# Patient Record
Sex: MALE | Race: WHITE | NOT HISPANIC OR LATINO | Employment: OTHER | URBAN - METROPOLITAN AREA
[De-identification: names, ages, dates, MRNs, and addresses within clinical notes are randomized per-mention and may not be internally consistent; named-entity substitution may affect disease eponyms.]

---

## 2017-01-19 DIAGNOSIS — E78.5 HLD (HYPERLIPIDEMIA): Primary | ICD-10-CM

## 2017-01-19 RX ORDER — ROSUVASTATIN CALCIUM 20 MG/1
20 TABLET, COATED ORAL DAILY
Qty: 90 TABLET | Refills: 0 | Status: SHIPPED | OUTPATIENT
Start: 2017-01-19 | End: 2017-04-26

## 2017-02-07 ENCOUNTER — OFFICE VISIT (OUTPATIENT)
Dept: CARDIOLOGY | Facility: CLINIC | Age: 67
End: 2017-02-07
Attending: INTERNAL MEDICINE
Payer: COMMERCIAL

## 2017-02-07 ENCOUNTER — HOSPITAL ENCOUNTER (OUTPATIENT)
Dept: LAB | Facility: CLINIC | Age: 67
Discharge: HOME | End: 2017-02-07
Attending: INTERNAL MEDICINE | Admitting: INTERNAL MEDICINE
Payer: COMMERCIAL

## 2017-02-07 VITALS
DIASTOLIC BLOOD PRESSURE: 74 MMHG | WEIGHT: 231 LBS | SYSTOLIC BLOOD PRESSURE: 144 MMHG | HEIGHT: 73 IN | HEART RATE: 60 BPM | BODY MASS INDEX: 30.62 KG/M2

## 2017-02-07 DIAGNOSIS — I25.10 CORONARY ARTERY DISEASE INVOLVING NATIVE CORONARY ARTERY OF NATIVE HEART WITHOUT ANGINA PECTORIS: ICD-10-CM

## 2017-02-07 DIAGNOSIS — I10 ESSENTIAL HYPERTENSION: ICD-10-CM

## 2017-02-07 DIAGNOSIS — Z76.0 ENCOUNTER FOR MEDICATION REFILL: Primary | ICD-10-CM

## 2017-02-07 DIAGNOSIS — R23.2 FACIAL FLUSHING: ICD-10-CM

## 2017-02-07 LAB
ALT SERPL W P-5'-P-CCNC: 32 U/L (ref 0–70)
ALT SERPL W P-5'-P-CCNC: <5 U/L (ref 5–30)
ANION GAP SERPL CALCULATED.3IONS-SCNC: 7 MMOL/L (ref 3–14)
BUN SERPL-MCNC: 15 MG/DL (ref 7–30)
CALCIUM SERPL-MCNC: 9.2 MG/DL (ref 8.5–10.1)
CHLORIDE SERPL-SCNC: 105 MMOL/L (ref 94–109)
CHOLEST SERPL-MCNC: 145 MG/DL
CHOLEST SERPL-MCNC: 148 MG/DL
CO2 SERPL-SCNC: 27 MMOL/L (ref 20–32)
CREAT SERPL-MCNC: 1.13 MG/DL (ref 0.66–1.25)
GFR SERPL CREATININE-BSD FRML MDRD: 65 ML/MIN/1.7M2
GLUCOSE SERPL-MCNC: 101 MG/DL (ref 70–99)
HDLC SERPL-MCNC: 54 MG/DL
HDLC SERPL-MCNC: 65 MG/DL
LDLC SERPL CALC-MCNC: 60 MG/DL
LDLC SERPL CALC-MCNC: 74 MG/DL
NONHDLC SERPL-MCNC: 80 MG/DL
NONHDLC SERPL-MCNC: 94 MG/DL
POTASSIUM SERPL-SCNC: 4.5 MMOL/L (ref 3.4–5.3)
SODIUM SERPL-SCNC: 139 MMOL/L (ref 133–144)
TRIGL SERPL-MCNC: 101 MG/DL
TRIGL SERPL-MCNC: 98 MG/DL

## 2017-02-07 PROCEDURE — 99214 OFFICE O/P EST MOD 30 MIN: CPT | Performed by: INTERNAL MEDICINE

## 2017-02-07 PROCEDURE — 84460 ALANINE AMINO (ALT) (SGPT): CPT | Performed by: INTERNAL MEDICINE

## 2017-02-07 PROCEDURE — 36415 COLL VENOUS BLD VENIPUNCTURE: CPT | Performed by: INTERNAL MEDICINE

## 2017-02-07 PROCEDURE — 80061 LIPID PANEL: CPT | Performed by: INTERNAL MEDICINE

## 2017-02-07 PROCEDURE — 80048 BASIC METABOLIC PNL TOTAL CA: CPT | Performed by: INTERNAL MEDICINE

## 2017-02-07 RX ORDER — LISINOPRIL 40 MG/1
40 TABLET ORAL DAILY
Qty: 90 TABLET | Refills: 3 | Status: SHIPPED | OUTPATIENT
Start: 2017-02-07 | End: 2017-03-24 | Stop reason: ALTCHOICE

## 2017-02-07 NOTE — PROGRESS NOTES
HPI and Plan:   See dictation    Orders Placed This Encounter   Procedures     5-HIAA quantitative urine     Basic metabolic panel     Lipid Profile     ALT     Basic metabolic panel     Follow-Up with Cardiac Advanced Practice Provider     Follow-Up with Cardiologist       Orders Placed This Encounter   Medications     lisinopril (PRINIVIL/ZESTRIL) 40 MG tablet     Sig: Take 1 tablet (40 mg) by mouth daily     Dispense:  90 tablet     Refill:  3       Medications Discontinued During This Encounter   Medication Reason     FLUARIX QUADRIVALENT 0.5 ML injection Therapy completed     ketoconazole (NIZORAL) 2 % cream Therapy completed     lisinopril (PRINIVIL/ZESTRIL) 20 MG tablet Reorder         Encounter Diagnoses   Name Primary?     Essential hypertension      Coronary artery disease involving native coronary artery of native heart without angina pectoris      Encounter for medication refill Yes     Facial flushing        CURRENT MEDICATIONS:  Current Outpatient Prescriptions   Medication Sig Dispense Refill     lisinopril (PRINIVIL/ZESTRIL) 40 MG tablet Take 1 tablet (40 mg) by mouth daily 90 tablet 3     rosuvastatin (CRESTOR) 20 MG tablet Take 1 tablet (20 mg) by mouth daily 90 tablet 0     albuterol (VENTOLIN HFA) 108 (90 BASE) MCG/ACT inhaler Inhale 2 puffs into the lungs every 6 hours as needed for shortness of breath / dyspnea or wheezing       tadalafil (CIALIS) 5 MG tablet Take 1 tablet by mouth as needed for erectile dysfunction. Never use with nitroglycerin, terazosin or doxazosin. 30 tablet 1     Multiple Vitamin (DAILY MULTIVITAMIN PO) Take  by mouth.         aspirin 81 MG tablet Take 1 tablet by mouth daily.  3     GLUCOSAMINE-CHONDROITIN PO Take 1 tablet by mouth daily. 1000 mg         ranitidine (ZANTAC) 150 MG tablet Take 150 mg by mouth as needed.       [DISCONTINUED] lisinopril (PRINIVIL/ZESTRIL) 20 MG tablet Take 1 tablet (20 mg) by mouth daily 90 tablet 11       ALLERGIES     Allergies   Allergen  "Reactions     Simvastatin      achey       PAST MEDICAL HISTORY:  Past Medical History   Diagnosis Date     ACP (advance care planning)      will bring copy in      Esophageal reflux 5/17/2013     Hyperlipidaemia      CAD (coronary artery disease) 2002     a stent     Hypertension      Family history of ischemic heart disease      Bruit        PAST SURGICAL HISTORY:  Past Surgical History   Procedure Laterality Date     Rotator cuff repair rt/lt  2009     Dr. Butler     Heart cath, angioplasty  10/4/02     3.0 X 8 mm Velocity stent       FAMILY HISTORY:  Family History   Problem Relation Age of Onset     C.A.D. Father      CANCER Father      bone     CEREBROVASCULAR DISEASE Mother        SOCIAL HISTORY:  Social History     Social History     Marital Status:      Spouse Name: N/A     Number of Children: N/A     Years of Education: N/A     Occupational History     Finance      Social History Main Topics     Smoking status: Never Smoker      Smokeless tobacco: Never Used     Alcohol Use: 0.5 oz/week     1 Standard drinks or equivalent per week      Comment: ocasionally     Drug Use: No     Sexual Activity:     Partners: Female     Other Topics Concern     Parent/Sibling W/ Cabg, Mi Or Angioplasty Before 65f 55m? No     Sleep Concern Yes     occ     Stress Concern No     Exercise Yes     walks daily     Social History Narrative       Review of Systems:  Skin:  Negative       Eyes:  Positive for glasses    ENT:  Negative      Respiratory:  Positive for dyspnea on exertion     Cardiovascular:  Negative      Gastroenterology: Negative      Genitourinary:  Negative      Musculoskeletal:  Negative      Neurologic:  Negative      Psychiatric:  Negative      Heme/Lymph/Imm:  Negative      Endocrine:  Negative        Physical Exam:  Vitals: /74 mmHg  Pulse 60  Ht 1.854 m (6' 1\")  Wt 104.781 kg (231 lb)  BMI 30.48 kg/m2    Constitutional:  cooperative, alert and oriented, well developed, well nourished, in no " acute distress overweight      Skin:  warm and dry to the touch, no apparent skin lesions or masses noted        Head:  normocephalic, no masses or lesions        Eyes:  pupils equal and round, conjunctivae and lids unremarkable, sclera white, no xanthalasma, EOMS intact, no nystagmus        ENT:  no pallor or cyanosis, dentition good        Neck:  carotid pulses are full and equal bilaterally, JVP normal, no carotid bruit, no thyromegaly        Chest:  normal breath sounds, clear to auscultation, normal A-P diameter, normal symmetry, normal respiratory excursion, no use of accessory muscles          Cardiac: regular rhythm;normal S1 and S2;apical impulse not displaced;no murmurs, gallops or rubs detected   S4              Abdomen:  abdomen soft, non-tender, BS normoactive, no mass, no HSM, no bruits;abdomen soft        Vascular: pulses full and equal, no bruits auscultated                                        Extremities and Back:  no deformities, clubbing, cyanosis, erythema observed;no edema              Neurological:  affect appropriate, oriented to time, person and place;no gross motor deficits              CC  Braulio Blanca MD   [facility]  [address]  [place] [address]

## 2017-02-07 NOTE — PROGRESS NOTES
2017             Willie Nam MD    [facility]   [address] [place] [address]       RE:    Peter Hernandez   MRN:  [id number]   :  1950      Dear Willie:      It is my pleasure to see your patient, Peter Hernandez.  He is a very pleasant 66-year-old gentleman with a history of coronary artery disease and prior stenting of the left anterior descending artery, essential hypertension and hyperlipidemia.        Since I last saw him, he has been doing reasonably well.  He has noticed that he gets facial flushing in the morning times, especially and he thought that this may be due to caffeine, but even with cutting out caffeine, he is still getting facial flushing.  We should certainly make sure that he does not have carcinoid syndrome.  He does not have any chest pains or chest pressure, but he has noticed occasional discomfort in his left and occasionally his right shoulder which is not associated with exertion.  He finds that movement of the shoulder or pressing on the shoulder relieves the discomfort.  This is most likely noncardiac.  He did have a stress echocardiogram performed about 13 months ago which showed no evidence of ischemia.      His blood pressure does not appear to be ideally controlled.  In August, his blood pressure was 148/78.  In December, his blood pressure is 154/70 and today his blood pressure is 144/74.  This is despite the fact that his lisinopril was increased from 10 mg to 20 mg.  He has gained weight, which may account for at least partially the rise in blood pressure.  In 10/2015, he weighed 226 pounds.  He now weighs 231 pounds.        His lipid profile is reasonably good.  His LDL is 74, HDL is 54 and triglycerides are 101.  However, in , his LDL was 57, HDL was 57 and triglycerides were 89.  Again, this may be due to the fact that he has gained weight.  He does try to adhere to a low-cholesterol diet, but he was unaware of foods that were salted,  such as nuts, chips, etc.  These of course can increase his blood pressure also.  He is going to try more diligently to watch out for foods containing salt.        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      IMPRESSION:   1.  Coronary artery disease.  The patient appears to be asymptomatic with respect to coronary disease with no objective evidence of ischemia on a stress echocardiogram 13 months ago.   2.  Essential hypertension.  His blood pressure is mildly raised, even despite the fact that his lisinopril was increased from 10 mg to 20 mg.  Part of this may be due to the fact that he has gained weight and he also may not be adhering to a low-salt diet.   3.  Hyperlipidemia.  His lipids are reasonably well controlled and he is on high-intensity statin therapy; however, his LDL has crept up over the last 3 years, probably due to weight gain.   4.  4.  Facial flushing.  We should make sure the patient does not have carcinoid syndrome.      PLAN:  I have encouraged the patient to adopt lifestyle changes including low-salt diet, exercise and weight reduction.  In 3 months' time, we will recheck his lipids and also his blood pressure.  Secondly, I will increase lisinopril from 20 mg to 40 mg.  We will also recheck his blood pressure in 12 weeks' time again.  We will also check his basic metabolic profile to ensure that the increase in lisinopril does not affect his kidney function.  Finally, we will obtain a 24-hour urine collection for 5HIAA to ensure that he does not have carcinoid syndrome.        It has been a pleasure being involved in the care of this very nice patient.  I look forward to seeing him again.      Sincerely,         MD MONY Valdovinos MD, [facility]             D: 02/07/2017 08:21   T: 02/07/2017 11:39   MT: [id number]      Name:     NATALY CONTRERAS   MRN:       [id number]        Account:      [id number]   :      1950           Service Date: 2017      Document: [id number]

## 2017-02-07 NOTE — Clinical Note
2017             Willie Nam MD    [facility]   [address] [place] [address]       RE:    Peter Hernandez   MRN:  [id number]   :  1950      Dear Willie:      It is my pleasure to see your patient, Peter Hernandez.  He is a very pleasant 66-year-old gentleman with a history of coronary artery disease and prior stenting of the left anterior descending artery, essential hypertension and hyperlipidemia.        Since I last saw him, he has been doing reasonably well.  He has noticed that he gets facial flushing in the morning times, especially and he thought that this may be due to caffeine, but even with cutting out caffeine, he is still getting facial flushing.  We should certainly make sure that he does not have carcinoid syndrome.  He does not have any chest pains or chest pressure, but he has noticed occasional discomfort in his left and occasionally his right shoulder which is not associated with exertion.  He finds that movement of the shoulder or pressing on the shoulder relieves the discomfort.  This is most likely noncardiac.  He did have a stress echocardiogram performed about 13 months ago which showed no evidence of ischemia.      His blood pressure does not appear to be ideally controlled.  In August, his blood pressure was 148/78.  In December, his blood pressure is 154/70 and today his blood pressure is 144/74.  This is despite the fact that his lisinopril was increased from 10 mg to 20 mg.  He has gained weight, which may account for at least partially the rise in blood pressure.  In 10/2015, he weighed 226 pounds.  He now weighs 231 pounds.        His lipid profile is reasonably good.  His LDL is 74, HDL is 54 and triglycerides are 101.  However, in , his LDL was 57, HDL was 57 and triglycerides were 89.  Again, this may be due to the fact that he has gained weight.  He does try to adhere to a low-cholesterol diet, but he was unaware of foods that were salted,  such as nuts, chips, etc.  These of course can increase his blood pressure also.  He is going to try more diligently to watch out for foods containing salt.          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      IMPRESSION:   1.  Coronary artery disease.  The patient appears to be asymptomatic with respect to coronary disease with no objective evidence of ischemia on a stress echocardiogram 13 months ago.   2.  Essential hypertension.  His blood pressure is mildly raised, even despite the fact that his lisinopril was increased from 10 mg to 20 mg.  Part of this may be due to the fact that he has gained weight and he also may not be adhering to a low-salt diet.   3.  Hyperlipidemia.  His lipids are reasonably well controlled and he is on high-intensity statin therapy; however, his LDL has crept up over the last 3 years, probably due to weight gain.   4.  4.  Facial flushing.  We should make sure the patient does not have carcinoid syndrome.      PLAN:  I have encouraged the patient to adopt lifestyle changes including low-salt diet, exercise and weight reduction.  In 3 months' time, we will recheck his lipids and also his blood pressure.  Secondly, I will increase lisinopril from 20 mg to 40 mg.  We will also recheck his blood pressure in 12 weeks' time again.  We will also check his basic metabolic profile to ensure that the increase in lisinopril does not affect his kidney function.  Finally, we will obtain a 24-hour urine collection for 5HIAA to ensure that he does not have carcinoid syndrome.        It has been a pleasure being involved in the care of this very nice patient.  I look forward to seeing him again.      Sincerely,        Braulio Aparicio MD

## 2017-02-13 ENCOUNTER — HOSPITAL ENCOUNTER (OUTPATIENT)
Dept: LAB | Facility: CLINIC | Age: 67
Discharge: HOME | End: 2017-02-13
Attending: INTERNAL MEDICINE | Admitting: INTERNAL MEDICINE
Payer: COMMERCIAL

## 2017-02-13 DIAGNOSIS — R23.2 FACIAL FLUSHING: ICD-10-CM

## 2017-02-13 PROCEDURE — 83497 ASSAY OF 5-HIAA: CPT | Performed by: INTERNAL MEDICINE

## 2017-02-15 LAB
5HIAA & CREATININE UR-IMP: NORMAL
5OH-INDOLEACETATE 24H UR-MCNC: 3.2 MG/ML
5OH-INDOLEACETATE 24H UR-MRATE: 7 MG/(24.H)
5OH-INDOLEACETATE/CREAT 24H UR: 4 MG/G{CREAT}
COLLECT DURATION TIME UR: 24 H
CREAT 24H UR-MRATE: 1806 G/(24.H)
CREAT SERPL-MCNC: 86 MG/DL
SPECIMEN VOL ?TM UR: 2100 ML

## 2017-02-24 ENCOUNTER — OFFICE VISIT (OUTPATIENT)
Dept: FAMILY MEDICINE | Facility: CLINIC | Age: 67
End: 2017-02-24

## 2017-02-24 VITALS
SYSTOLIC BLOOD PRESSURE: 146 MMHG | WEIGHT: 229 LBS | HEART RATE: 61 BPM | OXYGEN SATURATION: 97 % | DIASTOLIC BLOOD PRESSURE: 60 MMHG | BODY MASS INDEX: 30.21 KG/M2

## 2017-02-24 DIAGNOSIS — I10 BENIGN ESSENTIAL HYPERTENSION: Primary | ICD-10-CM

## 2017-02-24 DIAGNOSIS — D48.5 NEOPLASM OF UNCERTAIN BEHAVIOR OF SKIN: ICD-10-CM

## 2017-02-24 DIAGNOSIS — L82.0 INFLAMED SEBORRHEIC KERATOSIS: ICD-10-CM

## 2017-02-24 PROCEDURE — 17110 DESTRUCTION B9 LES UP TO 14: CPT | Performed by: FAMILY MEDICINE

## 2017-02-24 PROCEDURE — 99213 OFFICE O/P EST LOW 20 MIN: CPT | Mod: 25 | Performed by: FAMILY MEDICINE

## 2017-02-24 RX ORDER — AMLODIPINE BESYLATE 5 MG/1
5 TABLET ORAL DAILY
Qty: 30 TABLET | Refills: 1 | Status: SHIPPED | OUTPATIENT
Start: 2017-02-24 | End: 2017-04-21

## 2017-02-24 RX ORDER — LISINOPRIL 20 MG/1
TABLET ORAL
COMMUNITY
Start: 2016-12-19 | End: 2017-03-24 | Stop reason: ALTCHOICE

## 2017-02-24 RX ORDER — KETOCONAZOLE 20 MG/G
CREAM TOPICAL
COMMUNITY
Start: 2017-02-04 | End: 2017-05-26

## 2017-02-24 NOTE — PATIENT INSTRUCTIONS
Wheat Belly: Lose the Wheat, Lose the Weight, and Find Your Path Back to Health by Abad Abreu (Aug 30, 2011)

## 2017-02-24 NOTE — PROGRESS NOTES
Problem(s) Oriented visit        SUBJECTIVE:                                                    Peter Hernandez II is a 66 year old male who presents to clinic today for the following health issues :      1. Benign essential hypertension  he has Hypertension which is currently not well controlled. he has been compliant with his medications and is here today to follow up on the this issue and see if we can't work on better control of the blood pressure.    Reviewed last 6 BP readings in chart:  BP Readings from Last 6 Encounters:   02/24/17 146/60   02/07/17 144/74   12/19/16 154/70   11/20/16 167/89   08/22/16 148/78   11/30/15 140/80     he  has not experienced any significant side effects from current medications for hypertension.    NO active cardiac complaints or symptoms with exercise.    - amLODIPine (NORVASC) 5 MG tablet; Take 1 tablet (5 mg) by mouth daily  Dispense: 30 tablet; Refill: 1         Problem list, Medication list, Allergies, and Medical/Social/Surgical histories reviewed in EPIC and updated as appropriate.   Additional history: as documented    ROS:  General and Resp. completed and negative except as noted above    Histories:   Patient Active Problem List   Diagnosis     Hypercholesteremia     CAD (coronary artery disease)     ACP (advance care planning)     Esophageal reflux     Health Care Home     Family history of ischemic heart disease     Asthma     Benign essential hypertension     Past Surgical History   Procedure Laterality Date     Rotator cuff repair rt/lt  2009     Dr. Butler     Heart cath, angioplasty  10/4/02     3.0 X 8 mm Velocity stent       Social History   Substance Use Topics     Smoking status: Never Smoker     Smokeless tobacco: Never Used     Alcohol use 0.5 oz/week     1 Standard drinks or equivalent per week      Comment: ocasionally     Family History   Problem Relation Age of Onset     C.A.D. Father      CANCER Father      bone     CEREBROVASCULAR DISEASE Mother             OBJECTIVE:                                                    /60  Pulse 61  Wt 103.9 kg (229 lb)  SpO2 97%  BMI 30.21 kg/m2  Body mass index is 30.21 kg/(m^2).   APPEARANCE: = Relaxed and in no distress  Conj/Eyelids = noninjected and lids and lashes are without inflammation  PERRLA/Irises = Pupils Round Reactive to Light and Irisis without inflammation  Neck = No anterior or posterior adenopathy appreciated.  Thyroid = Not enlarged and no masses felt  Resp effort = Calm regular breathing  Breath Sounds = Good air movement with no rales or rhonchi in any lung fields  Heart Rate, Rythym, & sounds (no Murm)  = Regular rate and rythym with no S3, S4, or murmer appreciated.  Carotid Art's = Pulses full and equal and no bruits appreciated  Abdomen = Soft, nontender, no masses, & bowel sounds in all quadrants  Liver/Spleen = Normal span and no splenomegaly noted  Digits and Nails = FROM in all finger joints, no nail dystrophy  Ext (edema) = No pretibial edema noted or elsewhere  Musculsktl =  Strength and ROM of major joints are within normal limits  SKIN = absent significant rashes or lesions   Recent/Remote Memory = Alert and Oriented x 3  Mood/Affect = Cooperative and interested     ASSESSMENT/PLAN:                                                        Peter was seen today for results.    Diagnoses and all orders for this visit:    Benign essential hypertension  -     amLODIPine (NORVASC) 5 MG tablet; Take 1 tablet (5 mg) by mouth daily    recheck here in a month    Work on weight loss    The following health maintenance items are reviewed in Epic and correct as of today:  Health Maintenance   Topic Date Due     ASTHMA CONTROL TEST Q6 MOS (NO INBASKET)  02/22/2017     ASTHMA ACTION PLAN Q1 YR (NO INBASKET)  12/19/2017     LIPID MONITORING Q1 YEAR( NO INBASKET)  02/07/2018     ADVANCE DIRECTIVE PLANNING Q5 YRS (NO INBASKET)  05/17/2018     COLON CANCER SCREEN (SYSTEM ASSIGNED)  07/18/2021     TETANUS  IMMUNIZATION (SYSTEM ASSIGNED)  10/24/2023     INFLUENZA VACCINE (SYSTEM ASSIGNED)  Completed     HEPATITIS C SCREENING  Completed       Willie Nam MD  [facility]  [phone]    For any issues my office # is [phone]

## 2017-02-24 NOTE — MR AVS SNAPSHOT
After Visit Summary   2/24/2017    Peter Hernandez II    MRN: [id number]           Patient Information     Date Of Birth          1950        Visit Information        Provider Department      2/24/2017 8:30 AM Willie Nam MD [facility]        Today's Diagnoses     Benign essential hypertension    -  1      Care Instructions      Wheat Belly: Lose the Wheat, Lose the Weight, and Find Your Path Back to Health by Abad Abreu (Aug 30, 2011)          Follow-ups after your visit        Who to contact     If you have questions or need follow up information about today's clinic visit or your schedule please contact [facility] directly at [phone].  Normal or non-critical lab and imaging results will be communicated to you by MyChart, letter or phone within 4 business days after the clinic has received the results. If you do not hear from us within 7 days, please contact the clinic through [facility] or phone. If you have a critical or abnormal lab result, we will notify you by phone as soon as possible.  Submit refill requests through [facility] or call your pharmacy and they will forward the refill request to us. Please allow 3 business days for your refill to be completed.          Additional Information About Your Visit        MyChart Information     [facility] gives you secure access to your electronic health record. If you see a primary care provider, you can also send messages to your care team and make appointments. If you have questions, please call your primary care clinic.  If you do not have a primary care provider, please call [phone] and they will assist you.        Care EveryWhere ID     This is your Care EveryWhere ID. This could be used by other organizations to access your [facility] medical records  [id number]        Your Vitals Were     Pulse Pulse Oximetry BMI (Body Mass Index)             61 97% 30.21 kg/m2          Blood Pressure from Last 3  Encounters:   02/24/17 146/60   02/07/17 144/74   12/19/16 154/70    Weight from Last 3 Encounters:   02/24/17 103.9 kg (229 lb)   02/07/17 104.8 kg (231 lb)   12/19/16 104.8 kg (231 lb)              Today, you had the following     No orders found for display         Today's Medication Changes          These changes are accurate as of: 2/24/17  8:50 AM.  If you have any questions, ask your nurse or doctor.               Start taking these medicines.        Dose/Directions    amLODIPine 5 MG tablet   Commonly known as:  NORVASC   Used for:  Benign essential hypertension   Started by:  Willie Nam MD        Dose:  5 mg   Take 1 tablet (5 mg) by mouth daily   Quantity:  30 tablet   Refills:  1            Where to get your medicines      These medications were sent to [facility]/pharmacy #[id number] - [place], [place] - [address]  [address] [address]     Phone:  [phone]     amLODIPine 5 MG tablet                Primary Care Provider Office Phone # Fax #    Willie Nam [phone]       [facility] [address]  [facility] [address]        Thank you!     Thank you for choosing [facility]  for your care. Our goal is always to provide you with excellent care. Hearing back from our patients is one way we can continue to improve our services. Please take a few minutes to complete the written survey that you may receive in the mail after your visit with us. Thank you!             Your Updated Medication List - Protect others around you: Learn how to safely use, store and throw away your medicines at www.disposemymeds.org.          This list is accurate as of: 2/24/17  8:50 AM.  Always use your most recent med list.                   Brand Name Dispense Instructions for use    amLODIPine 5 MG tablet    NORVASC    30 tablet    Take 1 tablet (5 mg) by mouth daily       aspirin 81 MG tablet      Take 1 tablet by mouth daily.        CIALIS 5 MG tablet   Generic drug:  tadalafil     30 tablet    Take 1 tablet by mouth as needed for erectile dysfunction. Never use with nitroglycerin, terazosin or doxazosin.       DAILY MULTIVITAMIN PO      Take  by mouth.       GLUCOSAMINE-CHONDROITIN PO      Take 1 tablet by mouth daily. 1000 mg       ketoconazole 2 % cream    NIZORAL         * lisinopril 20 MG tablet    PRINIVIL/ZESTRIL         * lisinopril 40 MG tablet    PRINIVIL/ZESTRIL    90 tablet    Take 1 tablet (40 mg) by mouth daily       * lisinopril 10 MG tablet    PRINIVIL/ZESTRIL    90 tablet    TAKE 1 TABLET BY MOUTH ONCE DAILY.       ranitidine 150 MG tablet    ZANTAC     Take 150 mg by mouth as needed.       rosuvastatin 20 MG tablet    CRESTOR    90 tablet    Take 1 tablet (20 mg) by mouth daily       VENTOLIN  (90 BASE) MCG/ACT Inhaler   Generic drug:  albuterol      Inhale 2 puffs into the lungs every 6 hours as needed for shortness of breath / dyspnea or wheezing       * Notice:  This list has 3 medication(s) that are the same as other medications prescribed for you. Read the directions carefully, and ask your doctor or other care provider to review them with you.

## 2017-02-24 NOTE — PROGRESS NOTES
1 SebK's on the scalp. Itchy, irritating and unsightly.    ROS: No bleeding problems.    cryotherapy applied  Liquid nitrogen was applied for 5-10 seconds x3  to the skin lesions      A:P  Irritated, red and itchy  Mani K's  The expected blistering or scabbing reaction explained. Do not pick at the areas. Patient reminded to expect hypopigmented scars from the procedure. Return if lesions fail to fully resolve.   Willie Nam

## 2017-02-25 ASSESSMENT — ASTHMA QUESTIONNAIRES: ACT_TOTALSCORE: 20

## 2017-03-24 ENCOUNTER — OFFICE VISIT (OUTPATIENT)
Dept: FAMILY MEDICINE | Facility: CLINIC | Age: 67
End: 2017-03-24

## 2017-03-24 VITALS
OXYGEN SATURATION: 95 % | BODY MASS INDEX: 30.08 KG/M2 | HEART RATE: 65 BPM | DIASTOLIC BLOOD PRESSURE: 72 MMHG | SYSTOLIC BLOOD PRESSURE: 150 MMHG | WEIGHT: 228 LBS

## 2017-03-24 DIAGNOSIS — I25.10 CORONARY ARTERY DISEASE INVOLVING NATIVE CORONARY ARTERY OF NATIVE HEART WITHOUT ANGINA PECTORIS: ICD-10-CM

## 2017-03-24 DIAGNOSIS — Z76.0 ENCOUNTER FOR MEDICATION REFILL: ICD-10-CM

## 2017-03-24 DIAGNOSIS — M54.2 NECK PAIN: ICD-10-CM

## 2017-03-24 DIAGNOSIS — I10 BENIGN ESSENTIAL HYPERTENSION: Primary | ICD-10-CM

## 2017-03-24 PROCEDURE — 99214 OFFICE O/P EST MOD 30 MIN: CPT | Performed by: FAMILY MEDICINE

## 2017-03-24 RX ORDER — LISINOPRIL 10 MG/1
TABLET ORAL
Qty: 90 TABLET | Refills: 1 | Status: SHIPPED | OUTPATIENT
Start: 2017-03-24 | End: 2017-08-15

## 2017-03-24 RX ORDER — HYDROCODONE BITARTRATE AND ACETAMINOPHEN 5; 325 MG/1; MG/1
1 TABLET ORAL EVERY 8 HOURS PRN
Qty: 30 TABLET | Refills: 0 | Status: SHIPPED | OUTPATIENT
Start: 2017-03-24 | End: 2017-04-23

## 2017-03-24 NOTE — MR AVS SNAPSHOT
After Visit Summary   3/24/2017    Peter Hernandez II    MRN: [id number]           Patient Information     Date Of Birth          1950        Visit Information        Provider Department      3/24/2017 7:45 AM Willie Nam MD [facility]        Today's Diagnoses     Benign essential hypertension    -  1    Encounter for medication refill        Coronary artery disease involving native coronary artery of native heart without angina pectoris        Neck pain           Follow-ups after your visit        Who to contact     If you have questions or need follow up information about today's clinic visit or your schedule please contact [facility] directly at [phone].  Normal or non-critical lab and imaging results will be communicated to you by [employer], letter or phone within 4 business days after the clinic has received the results. If you do not hear from us within 7 days, please contact the clinic through [employer] or phone. If you have a critical or abnormal lab result, we will notify you by phone as soon as possible.  Submit refill requests through [employer] or call your pharmacy and they will forward the refill request to us. Please allow 3 business days for your refill to be completed.          Additional Information About Your Visit        MyChart Information     Vend gives you secure access to your electronic health record. If you see a primary care provider, you can also send messages to your care team and make appointments. If you have questions, please call your primary care clinic.  If you do not have a primary care provider, please call [phone] and they will assist you.        Care EveryWhere ID     This is your Care EveryWhere ID. This could be used by other organizations to access your [facility] medical records  [id number]        Your Vitals Were     Pulse Pulse Oximetry BMI (Body Mass Index)             65 95% 30.08 kg/m2          Blood  Pressure from Last 3 Encounters:   03/24/17 150/72   02/24/17 146/60   02/07/17 144/74    Weight from Last 3 Encounters:   03/24/17 103.4 kg (228 lb)   02/24/17 103.9 kg (229 lb)   02/07/17 104.8 kg (231 lb)              Today, you had the following     No orders found for display         Today's Medication Changes          These changes are accurate as of: 3/24/17  8:09 AM.  If you have any questions, ask your nurse or doctor.               Start taking these medicines.        Dose/Directions    HYDROcodone-acetaminophen 5-325 MG per tablet   Commonly known as:  NORCO   Used for:  Neck pain   Started by:  iWllie Nam MD        Dose:  1 tablet   Take 1 tablet by mouth every 8 hours as needed   Quantity:  30 tablet   Refills:  0         These medicines have changed or have updated prescriptions.        Dose/Directions    lisinopril 10 MG tablet   Commonly known as:  PRINIVIL/ZESTRIL   This may have changed:  See the new instructions.   Used for:  Encounter for medication refill   Changed by:  Willie Nam MD        TAKE 1 TABLET BY MOUTH ONCE DAILY.   Quantity:  90 tablet   Refills:  1            Where to get your medicines      These medications were sent to [facility]/pharmacy #[id number] - [place], [place] - [address] [address]     Phone:  [phone]     lisinopril 10 MG tablet         Some of these will need a paper prescription and others can be bought over the counter.  Ask your nurse if you have questions.     Bring a paper prescription for each of these medications     HYDROcodone-acetaminophen 5-325 MG per tablet                Primary Care Provider Office Phone # Fax #    Willie Nam [phone]       [place] [address] [facility] [address]        Thank you!     Thank you for choosing [facility]  for your care. Our goal is always to provide you with excellent care. Hearing back from our  patients is one way we can continue to improve our services. Please take a few minutes to complete the written survey that you may receive in the mail after your visit with us. Thank you!             Your Updated Medication List - Protect others around you: Learn how to safely use, store and throw away your medicines at www.disposemymeds.org.          This list is accurate as of: 3/24/17  8:09 AM.  Always use your most recent med list.                   Brand Name Dispense Instructions for use    amLODIPine 5 MG tablet    NORVASC    30 tablet    Take 1 tablet (5 mg) by mouth daily       aspirin 81 MG tablet      Take 1 tablet by mouth daily.       CIALIS 5 MG tablet   Generic drug:  tadalafil     30 tablet    Take 1 tablet by mouth as needed for erectile dysfunction. Never use with nitroglycerin, terazosin or doxazosin.       DAILY MULTIVITAMIN PO      Take  by mouth.       GLUCOSAMINE-CHONDROITIN PO      Take 1 tablet by mouth daily. 1000 mg       HYDROcodone-acetaminophen 5-325 MG per tablet    NORCO    30 tablet    Take 1 tablet by mouth every 8 hours as needed       ketoconazole 2 % cream    NIZORAL         lisinopril 10 MG tablet    PRINIVIL/ZESTRIL    90 tablet    TAKE 1 TABLET BY MOUTH ONCE DAILY.       ranitidine 150 MG tablet    ZANTAC     Take 150 mg by mouth as needed.       rosuvastatin 20 MG tablet    CRESTOR    90 tablet    Take 1 tablet (20 mg) by mouth daily       VENTOLIN  (90 BASE) MCG/ACT Inhaler   Generic drug:  albuterol      Inhale 2 puffs into the lungs every 6 hours as needed for shortness of breath / dyspnea or wheezing

## 2017-03-24 NOTE — PROGRESS NOTES
Problem(s) Oriented visit        SUBJECTIVE:                                                    Peter Hernandez II is a 66 year old male who presents to clinic today for the following health issues :        1. Neck pain  Working with PT and chiro and would pain reliever for the rare time  2. Benign essential hypertension  he has Hypertension which is currently not well controlled. he has been compliant with his medications and is here today to follow up on the this issue and see if we can't work on better control of the blood pressure.    Reviewed last 6 BP readings in chart:  BP Readings from Last 6 Encounters:   03/24/17 150/72   02/24/17 146/60   02/07/17 144/74   12/19/16 154/70   11/20/16 167/89   08/22/16 148/78     he  has not experienced any significant side effects from current medications for hypertension.    NO active cardiac complaints or symptoms with exercise.      3. Coronary artery disease involving native coronary artery of native heart without angina pectoris  Prior of coronary artery disease as listed in medical history.  No current or recent cardiovascaulr symptoms.   No shortness of breath, no episodes of chest pain/pressure, no dyspnea on exertion, no changes in his abiliy to perform physical exertion or tasks.  Takes the same amount of time to perform similar physical tasks.         Problem list, Medication list, Allergies, and Medical/Social/Surgical histories reviewed in EPIC and updated as appropriate.   Additional history: as documented    ROS:  5 point ROS completed and negative except noted above, including Gen, CV, Resp, GI, MS    Histories:   Patient Active Problem List   Diagnosis     Hypercholesteremia     CAD (coronary artery disease)     ACP (advance care planning)     Esophageal reflux     Health Care Home     Family history of ischemic heart disease     Asthma     Benign essential hypertension     Past Surgical History:   Procedure Laterality Date     HEART CATH, ANGIOPLASTY   10/4/02    3.0 X 8 mm Velocity stent     ROTATOR CUFF REPAIR RT/LT  2009    Dr. Butler       Social History   Substance Use Topics     Smoking status: Never Smoker     Smokeless tobacco: Never Used     Alcohol use 0.5 oz/week     1 Standard drinks or equivalent per week      Comment: ocasionally     Family History   Problem Relation Age of Onset     C.A.D. Father      CANCER Father      bone     CEREBROVASCULAR DISEASE Mother            OBJECTIVE:                                                    /72  Pulse 65  Wt 103.4 kg (228 lb)  SpO2 95%  BMI 30.08 kg/m2  Body mass index is 30.08 kg/(m^2).   APPEARANCE: = Relaxed and in no distress  Conj/Eyelids = noninjected and lids and lashes are without inflammation  PERRLA/Irises = Pupils Round Reactive to Light and Irisis without inflammation  Neck = No anterior or posterior adenopathy appreciated.  Thyroid = Not enlarged and no masses felt  Resp effort = Calm regular breathing  Breath Sounds = Good air movement with no rales or rhonchi in any lung fields  Heart Rate, Rythym, & sounds (no Murm)  = Regular rate and rythym with no S3, S4, or murmer appreciated.  Carotid Art's = Pulses full and equal and no bruits appreciated  Abdomen = Soft, nontender, no masses, & bowel sounds in all quadrants  Liver/Spleen = Normal span and no splenomegaly noted  Digits and Nails = FROM in all finger joints, no nail dystrophy  Ext (edema) = No pretibial edema noted or elsewhere  Musculsktl =  Strength and ROM of major joints are within normal limits  SKIN = absent significant rashes or lesions   Recent/Remote Memory = Alert and Oriented x 3  Mood/Affect = Cooperative and interested     ASSESSMENT/PLAN:                                                        Peter was seen today for recheck.    Diagnoses and all orders for this visit:    Benign essential hypertension    Encounter for medication refill  -     lisinopril (PRINIVIL/ZESTRIL) 10 MG tablet; TAKE 1 TABLET BY MOUTH ONCE  DAILY.    Coronary artery disease involving native coronary artery of native heart without angina pectoris    Neck pain  -     HYDROcodone-acetaminophen (NORCO) 5-325 MG per tablet; Take 1 tablet by mouth every 8 hours as needed    had not been on the lisinopril.  Will restart and continue the     Work on weight loss  Regular exercise    The following health maintenance items are reviewed in Epic and correct as of today:  Health Maintenance   Topic Date Due     ASTHMA CONTROL TEST Q6 MOS (NO INBASKET)  08/24/2017     INFLUENZA VACCINE (SYSTEM ASSIGNED)  09/01/2017     ASTHMA ACTION PLAN Q1 YR (NO INBASKET)  12/19/2017     LIPID MONITORING Q1 YEAR( NO INBASKET)  02/07/2018     ADVANCE DIRECTIVE PLANNING Q5 YRS (NO INBASKET)  05/17/2018     COLON CANCER SCREEN (SYSTEM ASSIGNED)  07/18/2021     TETANUS IMMUNIZATION (SYSTEM ASSIGNED)  10/24/2023     HEPATITIS C SCREENING  Completed       Willie Nam MD  [facility]  Family Practice  [facility]  [phone]    For any issues my office # is [phone]

## 2017-04-21 DIAGNOSIS — Z76.0 ENCOUNTER FOR MEDICATION REFILL: Primary | ICD-10-CM

## 2017-04-21 DIAGNOSIS — I10 BENIGN ESSENTIAL HYPERTENSION: ICD-10-CM

## 2017-04-21 RX ORDER — AMLODIPINE BESYLATE 5 MG/1
TABLET ORAL
Qty: 30 TABLET | Refills: 1 | Status: SHIPPED | OUTPATIENT
Start: 2017-04-21 | End: 2017-06-23

## 2017-04-21 NOTE — TELEPHONE ENCOUNTER
Pending Prescriptions:                       Disp   Refills    amLODIPine (NORVASC) 5 MG tablet [Pharmac*30 tab*1            Sig: TAKE 1 TABLET (5 MG) BY MOUTH DAILY    Last OV 3/24/17  BMP, Lipid 2/7/17  CBC, CMP 12/19/16

## 2017-04-26 DIAGNOSIS — E78.5 HLD (HYPERLIPIDEMIA): ICD-10-CM

## 2017-04-26 RX ORDER — ROSUVASTATIN CALCIUM 20 MG/1
20 TABLET, COATED ORAL DAILY
Qty: 90 TABLET | Refills: 0 | Status: SHIPPED | OUTPATIENT
Start: 2017-04-26 | End: 2017-08-15

## 2017-05-15 DIAGNOSIS — E78.00 HYPERCHOLESTEREMIA: Primary | ICD-10-CM

## 2017-05-16 ENCOUNTER — OFFICE VISIT (OUTPATIENT)
Dept: CARDIOLOGY | Facility: CLINIC | Age: 67
End: 2017-05-16
Attending: INTERNAL MEDICINE
Payer: COMMERCIAL

## 2017-05-16 VITALS
WEIGHT: 227 LBS | SYSTOLIC BLOOD PRESSURE: 148 MMHG | HEART RATE: 60 BPM | DIASTOLIC BLOOD PRESSURE: 72 MMHG | HEIGHT: 73 IN | BODY MASS INDEX: 30.09 KG/M2

## 2017-05-16 DIAGNOSIS — I25.10 CORONARY ARTERY DISEASE INVOLVING NATIVE CORONARY ARTERY OF NATIVE HEART WITHOUT ANGINA PECTORIS: ICD-10-CM

## 2017-05-16 DIAGNOSIS — I10 ESSENTIAL HYPERTENSION: ICD-10-CM

## 2017-05-16 DIAGNOSIS — E78.00 HYPERCHOLESTEREMIA: ICD-10-CM

## 2017-05-16 DIAGNOSIS — Z76.0 ENCOUNTER FOR MEDICATION REFILL: ICD-10-CM

## 2017-05-16 DIAGNOSIS — R23.2 FACIAL FLUSHING: ICD-10-CM

## 2017-05-16 LAB
ANION GAP SERPL CALCULATED.3IONS-SCNC: 9.8 MMOL/L (ref 6–17)
BUN SERPL-MCNC: 18 MG/DL (ref 7–30)
CALCIUM SERPL-MCNC: 9.4 MG/DL (ref 8.5–10.5)
CHLORIDE SERPL-SCNC: 103 MMOL/L (ref 98–107)
CHOLEST SERPL-MCNC: 133 MG/DL
CO2 SERPL-SCNC: 29 MMOL/L (ref 23–29)
CREAT SERPL-MCNC: 1.22 MG/DL (ref 0.7–1.3)
GFR SERPL CREATININE-BSD FRML MDRD: 60 ML/MIN/1.7M2
GLUCOSE SERPL-MCNC: 109 MG/DL (ref 70–105)
HDLC SERPL-MCNC: 53 MG/DL
LDLC SERPL CALC-MCNC: 64 MG/DL
NONHDLC SERPL-MCNC: 80 MG/DL
POTASSIUM SERPL-SCNC: 4.8 MMOL/L (ref 3.5–5.1)
SODIUM SERPL-SCNC: 137 MMOL/L (ref 136–145)
TRIGL SERPL-MCNC: 81 MG/DL

## 2017-05-16 PROCEDURE — 80061 LIPID PANEL: CPT | Performed by: INTERNAL MEDICINE

## 2017-05-16 PROCEDURE — 36415 COLL VENOUS BLD VENIPUNCTURE: CPT | Performed by: INTERNAL MEDICINE

## 2017-05-16 PROCEDURE — 80048 BASIC METABOLIC PNL TOTAL CA: CPT | Performed by: INTERNAL MEDICINE

## 2017-05-16 PROCEDURE — 99214 OFFICE O/P EST MOD 30 MIN: CPT | Performed by: NURSE PRACTITIONER

## 2017-05-16 RX ORDER — HYDROCHLOROTHIAZIDE 12.5 MG/1
12.5 CAPSULE ORAL DAILY
Qty: 90 CAPSULE | Refills: 3 | Status: SHIPPED | OUTPATIENT
Start: 2017-05-16 | End: 2018-05-25

## 2017-05-16 NOTE — LETTER
5/16/2017    Willie Nam MD  [facility]   [address]  [facility] [address]    RE: Peter Hernandez BELLA       Dear Colleague,    I had the pleasure of seeing Peter Hernandez II in the [facility].    Peter Hernandez is a very pleasant 67-year-old gentleman who follows here with Dr. Blanca.  He has a history of coronary artery disease with prior LAD stenting.  In addition, he has hypertension with dyslipidemia.  He saw Dr. Blanca in early February and was complaining of facial flushing.  A 5-HIAA urine test was performed and was negative for carcinoid.  He is having no chest discomfort.  Stress echocardiogram just over a year ago showed no evidence of ischemia with preserved LV function.      His blood pressure has been uncontrolled.  He tried increasing lisinopril to 40 mg a day, but felt like he was a bit sweaty around his eyes.  When he cut the dose back, this resolved.  He saw Dr. Willie Nam, who instructed him to take 10 mg of lisinopril a day, which he has been taking, along with amlodipine 5 mg once daily.  In spite of this, his blood pressure remains in the 140s.  His basic metabolic panel today is within normal limits.  Creatinine is up slightly, but only slightly.  His lipids today show an LDL at 64, HDL 53.  He remains on Crestor 20 mg per day.      He has no other cardiac complaints today.  We talked at length about how much he eats out with his job and he tries to limit sodium.  We also talked about limiting saturated fat.  He is feeling fine with no other cardiac symptoms.     Outpatient Encounter Prescriptions as of 5/16/2017   Medication Sig Dispense Refill     hydrochlorothiazide (MICROZIDE) 12.5 MG capsule Take 1 capsule (12.5 mg) by mouth daily 90 capsule 3     rosuvastatin (CRESTOR) 20 MG tablet Take 1 tablet (20 mg) by mouth daily 90 tablet 0     amLODIPine (NORVASC) 5 MG tablet TAKE 1 TABLET (5 MG) BY MOUTH DAILY 30  tablet 1     lisinopril (PRINIVIL/ZESTRIL) 10 MG tablet TAKE 1 TABLET BY MOUTH ONCE DAILY. 90 tablet 1     ketoconazole (NIZORAL) 2 % cream        albuterol (VENTOLIN HFA) 108 (90 BASE) MCG/ACT inhaler Inhale 2 puffs into the lungs every 6 hours as needed for shortness of breath / dyspnea or wheezing       tadalafil (CIALIS) 5 MG tablet Take 1 tablet by mouth as needed for erectile dysfunction. Never use with nitroglycerin, terazosin or doxazosin. 30 tablet 1     Multiple Vitamin (DAILY MULTIVITAMIN PO) Take  by mouth.         aspirin 81 MG tablet Take 1 tablet by mouth daily.  3     GLUCOSAMINE-CHONDROITIN PO Take 1 tablet by mouth daily. 1000 mg         ranitidine (ZANTAC) 150 MG tablet Take 150 mg by mouth as needed.       No facility-administered encounter medications on file as of 5/16/2017.       IMPRESSION AND PLAN:   1.  Coronary artery disease that is asymptomatic.  Recent ischemic workup just over a year ago showed no ischemia.  Continue medical therapy.   2.  Essential hypertension, uncontrolled.  Add hydrochlorothiazide to his lisinopril 10 mg and Norvasc 5 mg.  He will continue a low-salt diet, exercise, attempt a few pounds of weight loss.  He sees Dr. Willie Nam in 10 days.  I messaged him to do a basic metabolic panel at that point and the patient will see us back in 3 months' time.   3.  Dyslipidemia, controlled.  Continue Crestor.   4.  Facial flushing.  5-HIAA urine testing was unremarkable for carcinoid.  The symptoms have resolved.      Greater than 50% of this 30-minute visit today was spent in counseling.     Again, thank you for allowing me to participate in the care of your patient.      Sincerely,    JEANNE Mathis [facility]

## 2017-05-16 NOTE — MR AVS SNAPSHOT
After Visit Summary   5/16/2017    Peter Hernandez II    MRN: [id number]           Patient Information     Date Of Birth          1950        Visit Information        Provider Department      5/16/2017 9:30 AM Clemente Nichols APRN CNP [facility]        Today's Diagnoses     Essential hypertension        Coronary artery disease involving native coronary artery of native heart without angina pectoris        Encounter for medication refill        Facial flushing          Care Instructions    Start HCT 12.5mg daily    See Dr. Nam on 5-26 and check labs then    See us back in months        Follow-ups after your visit        Additional Services     Follow-Up with Cardiac Advanced Practice Provider                 Your next 10 appointments already scheduled     May 26, 2017  8:30 AM CDT   SHORT with Willie Nam MD   [facility] ([facility])    [address] [place] [address] [phone]              Future tests that were ordered for you today     Open Future Orders        Priority Expected Expires Ordered    Follow-Up with Cardiac Advanced Practice Provider Routine 8/14/2017 5/16/2018 5/16/2017    Basic metabolic panel Routine 5/26/2017 5/16/2018 5/16/2017            Who to contact     If you have questions or need follow up information about today's clinic visit or your schedule please contact [facility] directly at [phone].  Normal or non-critical lab and imaging results will be communicated to you by MyChart, letter or phone within 4 business days after the clinic has received the results. If you do not hear from us within 7 days, please contact the clinic through MyChart or phone. If you have a critical or abnormal lab result, we will notify you by phone as soon as possible.  Submit refill requests through [employer] or call your pharmacy and they  "will forward the refill request to us. Please allow 3 business days for your refill to be completed.          Additional Information About Your Visit        [employer] Information     edPULSE gives you secure access to your electronic health record. If you see a primary care provider, you can also send messages to your care team and make appointments. If you have questions, please call your primary care clinic.  If you do not have a primary care provider, please call [phone] and they will assist you.        Care EveryWhere ID     This is your Care EveryWhere ID. This could be used by other organizations to access your [facility] medical records  [id number]        Your Vitals Were     Pulse Height BMI (Body Mass Index)             60 1.854 m (6' 1\") 29.95 kg/m2          Blood Pressure from Last 3 Encounters:   05/16/17 148/72   03/24/17 150/72   02/24/17 146/60    Weight from Last 3 Encounters:   05/16/17 103 kg (227 lb)   03/24/17 103.4 kg (228 lb)   02/24/17 103.9 kg (229 lb)              We Performed the Following     Follow-Up with Cardiac Advanced Practice Provider          Today's Medication Changes          These changes are accurate as of: 5/16/17 10:15 AM.  If you have any questions, ask your nurse or doctor.               Start taking these medicines.        Dose/Directions    hydrochlorothiazide 12.5 MG capsule   Commonly known as:  MICROZIDE   Used for:  Essential hypertension   Started by:  Clemente Nichols APRN CNP        Dose:  12.5 mg   Take 1 capsule (12.5 mg) by mouth daily   Quantity:  90 capsule   Refills:  3            Where to get your medicines      These medications were sent to [facility]/pharmacy #[id number] [place], [place] - [address] [facility]  [address] [place] [address]     Phone:  [phone]     hydrochlorothiazide 12.5 MG capsule                Primary Care Provider Office Phone # Fax #    Willie Nam [phone]       [facility] [address]  [facility] [address]        Thank you!     Thank you for choosing [facility] [place]  for your care. Our goal is always to provide you with excellent care. Hearing back from our patients is one way we can continue to improve our services. Please take a few minutes to complete the written survey that you may receive in the mail after your visit with us. Thank you!             Your Updated Medication List - Protect others around you: Learn how to safely use, store and throw away your medicines at www.disposemymeds.org.          This list is accurate as of: 5/16/17 10:15 AM.  Always use your most recent med list.                   Brand Name Dispense Instructions for use    amLODIPine 5 MG tablet    NORVASC    30 tablet    TAKE 1 TABLET (5 MG) BY MOUTH DAILY       aspirin 81 MG tablet      Take 1 tablet by mouth daily.       CIALIS 5 MG tablet   Generic drug:  tadalafil     30 tablet    Take 1 tablet by mouth as needed for erectile dysfunction. Never use with nitroglycerin, terazosin or doxazosin.       DAILY MULTIVITAMIN PO      Take  by mouth.       GLUCOSAMINE-CHONDROITIN PO      Take 1 tablet by mouth daily. 1000 mg       hydrochlorothiazide 12.5 MG capsule    MICROZIDE    90 capsule    Take 1 capsule (12.5 mg) by mouth daily       ketoconazole 2 % cream    NIZORAL         lisinopril 10 MG tablet    PRINIVIL/ZESTRIL    90 tablet    TAKE 1 TABLET BY MOUTH ONCE DAILY.       ranitidine 150 MG tablet    ZANTAC     Take 150 mg by mouth as needed.       rosuvastatin 20 MG tablet    CRESTOR    90 tablet    Take 1 tablet (20 mg) by mouth daily       VENTOLIN  (90 BASE) MCG/ACT Inhaler   Generic drug:  albuterol      Inhale 2 puffs into the lungs every 6 hours as needed for shortness of breath / dyspnea or wheezing

## 2017-05-16 NOTE — PROGRESS NOTES
HPI and Plan:   See dictation  [id number]    Orders Placed This Encounter   Procedures     Basic metabolic panel     Follow-Up with Cardiac Advanced Practice Provider       Orders Placed This Encounter   Medications     hydrochlorothiazide (MICROZIDE) 12.5 MG capsule     Sig: Take 1 capsule (12.5 mg) by mouth daily     Dispense:  90 capsule     Refill:  3       There are no discontinued medications.      Encounter Diagnoses   Name Primary?     Essential hypertension      Coronary artery disease involving native coronary artery of native heart without angina pectoris      Encounter for medication refill      Facial flushing        CURRENT MEDICATIONS:  Current Outpatient Prescriptions   Medication Sig Dispense Refill     hydrochlorothiazide (MICROZIDE) 12.5 MG capsule Take 1 capsule (12.5 mg) by mouth daily 90 capsule 3     rosuvastatin (CRESTOR) 20 MG tablet Take 1 tablet (20 mg) by mouth daily 90 tablet 0     amLODIPine (NORVASC) 5 MG tablet TAKE 1 TABLET (5 MG) BY MOUTH DAILY 30 tablet 1     lisinopril (PRINIVIL/ZESTRIL) 10 MG tablet TAKE 1 TABLET BY MOUTH ONCE DAILY. 90 tablet 1     ketoconazole (NIZORAL) 2 % cream        albuterol (VENTOLIN HFA) 108 (90 BASE) MCG/ACT inhaler Inhale 2 puffs into the lungs every 6 hours as needed for shortness of breath / dyspnea or wheezing       tadalafil (CIALIS) 5 MG tablet Take 1 tablet by mouth as needed for erectile dysfunction. Never use with nitroglycerin, terazosin or doxazosin. 30 tablet 1     Multiple Vitamin (DAILY MULTIVITAMIN PO) Take  by mouth.         aspirin 81 MG tablet Take 1 tablet by mouth daily.  3     GLUCOSAMINE-CHONDROITIN PO Take 1 tablet by mouth daily. 1000 mg         ranitidine (ZANTAC) 150 MG tablet Take 150 mg by mouth as needed.         ALLERGIES     Allergies   Allergen Reactions     Simvastatin      achey       PAST MEDICAL HISTORY:  Past Medical History:   Diagnosis Date     ACP (advance care planning)     will bring copy in      Bruit      CAD  "(coronary artery disease) 2002    a stent     Esophageal reflux 5/17/2013     Family history of ischemic heart disease      Hyperlipidaemia      Hypertension        PAST SURGICAL HISTORY:  Past Surgical History:   Procedure Laterality Date     HEART CATH, ANGIOPLASTY  10/4/02    3.0 X 8 mm Velocity stent     ROTATOR CUFF REPAIR RT/LT  2009    Dr. Butler       FAMILY HISTORY:  Family History   Problem Relation Age of Onset     C.A.D. Father      CANCER Father      bone     CEREBROVASCULAR DISEASE Mother        SOCIAL HISTORY:  Social History     Social History     Marital status:      Spouse name: N/A     Number of children: N/A     Years of education: N/A     Occupational History     Finance      Social History Main Topics     Smoking status: Never Smoker     Smokeless tobacco: Never Used     Alcohol use 0.5 oz/week     1 Standard drinks or equivalent per week      Comment: ocasionally     Drug use: No     Sexual activity: Yes     Partners: Female     Other Topics Concern     Parent/Sibling W/ Cabg, Mi Or Angioplasty Before 65f 55m? No     Sleep Concern Yes     occ     Stress Concern No     Exercise Yes     walks daily     Social History Narrative       Review of Systems:  Skin:  Negative       Eyes:  Positive for glasses    ENT:  Negative      Respiratory:  Positive for dyspnea on exertion     Cardiovascular:  Negative      Gastroenterology: Negative      Genitourinary:  Positive for nocturia (X1)    Musculoskeletal:  Positive for back pain (left leg due to siciatica)    Neurologic:  Negative      Psychiatric:  Negative      Heme/Lymph/Imm:  Positive for allergies    Endocrine:  Negative        Physical Exam:  Vitals: /72  Pulse 60  Ht 1.854 m (6' 1\")  Wt 103 kg (227 lb)  BMI 29.95 kg/m2    Constitutional:  cooperative, alert and oriented, well developed, well nourished, in no acute distress overweight      Skin:  warm and dry to the touch, no apparent skin lesions or masses noted        Head:  " normocephalic, no masses or lesions        Eyes:  pupils equal and round, conjunctivae and lids unremarkable, sclera white, no xanthalasma, EOMS intact, no nystagmus        ENT:  no pallor or cyanosis, dentition good        Neck:  carotid pulses are full and equal bilaterally, JVP normal, no carotid bruit, no thyromegaly        Chest:  normal breath sounds, clear to auscultation, normal A-P diameter, normal symmetry, normal respiratory excursion, no use of accessory muscles          Cardiac: regular rhythm;normal S1 and S2;apical impulse not displaced;no murmurs, gallops or rubs detected   S4              Abdomen:  abdomen soft, non-tender, BS normoactive, no mass, no HSM, no bruits;abdomen soft        Vascular: pulses full and equal, no bruits auscultated                                        Extremities and Back:  no deformities, clubbing, cyanosis, erythema observed;no edema              Neurological:  affect appropriate, oriented to time, person and place;no gross motor deficits              CC  Braulio Blanca MD   [facility]  [address]  [place] [id number]

## 2017-05-16 NOTE — PROGRESS NOTES
HISTORY OF PRESENT ILLNESS:  Peter Hernandez is a very pleasant 67-year-old gentleman who follows here with Dr. Blanca.  He has a history of coronary artery disease with prior LAD stenting.  In addition, he has hypertension with dyslipidemia.  He saw Dr. Blanca in early February and was complaining of facial flushing.  A 5-HIAA urine test was performed and was negative for carcinoid.  He is having no chest discomfort.  Stress echocardiogram just over a year ago showed no evidence of ischemia with preserved LV function.      His blood pressure has been uncontrolled.  He tried increasing lisinopril to 40 mg a day, but felt like he was a bit sweaty around his eyes.  When he cut the dose back, this resolved.  He saw Dr. Willie Nam, who instructed him to take 10 mg of lisinopril a day, which he has been taking, along with amlodipine 5 mg once daily.  In spite of this, his blood pressure remains in the 140s.  His basic metabolic panel today is within normal limits.  Creatinine is up slightly, but only slightly.  His lipids today show an LDL at 64, HDL 53.  He remains on Crestor 20 mg per day.      He has no other cardiac complaints today.  We talked at length about how much he eats out with his job and he tries to limit sodium.  We also talked about limiting saturated fat.  He is feeling fine with no other cardiac symptoms.      IMPRESSION AND PLAN:   1.  Coronary artery disease that is asymptomatic.  Recent ischemic workup just over a year ago showed no ischemia.  Continue medical therapy.   2.  Essential hypertension, uncontrolled.  Add hydrochlorothiazide to his lisinopril 10 mg and Norvasc 5 mg.  He will continue a low-salt diet, exercise, attempt a few pounds of weight loss.  He sees Dr. Willie Nam in 10 days.  I messaged him to do a basic metabolic panel at that point and the patient will see us back in 3 months' time.   3.  Dyslipidemia, controlled.  Continue Crestor.   4.  Facial flushing.  5-HIAA  urine testing was unremarkable for carcinoid.  The symptoms have resolved.      Greater than 50% of this 30-minute visit today was spent in counseling.         JORDEN MOORE NP             D: 2017 10:21   T: 2017 17:37   MT: al      Name:     NATALY CONTRERAS   MRN:      [id number]        Account:      [id number]   :      1950           Service Date: 2017      Document: [id number]

## 2017-05-26 ENCOUNTER — OFFICE VISIT (OUTPATIENT)
Dept: FAMILY MEDICINE | Facility: CLINIC | Age: 67
End: 2017-05-26

## 2017-05-26 VITALS
HEIGHT: 72 IN | BODY MASS INDEX: 30.61 KG/M2 | HEART RATE: 58 BPM | TEMPERATURE: 98.1 F | SYSTOLIC BLOOD PRESSURE: 124 MMHG | DIASTOLIC BLOOD PRESSURE: 70 MMHG | OXYGEN SATURATION: 97 % | WEIGHT: 226 LBS

## 2017-05-26 DIAGNOSIS — I10 BENIGN ESSENTIAL HYPERTENSION: ICD-10-CM

## 2017-05-26 DIAGNOSIS — B37.2 YEAST INFECTION OF THE SKIN: Primary | ICD-10-CM

## 2017-05-26 PROCEDURE — 99213 OFFICE O/P EST LOW 20 MIN: CPT | Performed by: FAMILY MEDICINE

## 2017-05-26 RX ORDER — KETOCONAZOLE 20 MG/G
CREAM TOPICAL DAILY
Qty: 60 G | Refills: 3 | Status: SHIPPED | OUTPATIENT
Start: 2017-05-26 | End: 2018-07-16

## 2017-05-26 NOTE — MR AVS SNAPSHOT
After Visit Summary   5/26/2017    Peter Hernandez II    MRN: [id number]           Patient Information     Date Of Birth          1950        Visit Information        Provider Department      5/26/2017 8:30 AM Willie Nam MD [facility]        Today's Diagnoses     Yeast infection of the skin    -  1    Benign essential hypertension           Follow-ups after your visit        Your next 10 appointments already scheduled     Aug 15, 2017  7:30 AM CDT   Return Visit with JEANNE Mathis CNP   [facility] ([facility])    [address] [place] [id number] [phone]              Future tests that were ordered for you today     Open Future Orders        Priority Expected Expires Ordered    Basic Metabolic Panel (8) ([facility]) Routine 6/2/2017 8/24/2017 5/26/2017            Who to contact     If you have questions or need follow up information about today's clinic visit or your schedule please contact [facility] directly at [phone].  Normal or non-critical lab and imaging results will be communicated to you by Cellyhart, letter or phone within 4 business days after the clinic has received the results. If you do not hear from us within 7 days, please contact the clinic through Cellyhart or phone. If you have a critical or abnormal lab result, we will notify you by phone as soon as possible.  Submit refill requests through DigitalTangible or call your pharmacy and they will forward the refill request to us. Please allow 3 business days for your refill to be completed.          Additional Information About Your Visit        MyChart Information     DigitalTangible gives you secure access to your electronic health record. If you see a primary care provider, you can also send messages to your care team and make appointments. If you have questions, please call your primary care clinic.  If you do not have  "a primary care provider, please call [phone] and they will assist you.        Care EveryWhere ID     This is your Care EveryWhere ID. This could be used by other organizations to access your [facility] medical records  [id number]        Your Vitals Were     Pulse Temperature Height Pulse Oximetry BMI (Body Mass Index)       58 98.1  F (36.7  C) (Oral) 1.835 m (6' 0.25\") 97% 30.44 kg/m2        Blood Pressure from Last 3 Encounters:   05/26/17 124/70   05/16/17 148/72   03/24/17 150/72    Weight from Last 3 Encounters:   05/26/17 102.5 kg (226 lb)   05/16/17 103 kg (227 lb)   03/24/17 103.4 kg (228 lb)                 Today's Medication Changes          These changes are accurate as of: 5/26/17  8:48 AM.  If you have any questions, ask your nurse or doctor.               These medicines have changed or have updated prescriptions.        Dose/Directions    ketoconazole 2 % cream   Commonly known as:  NIZORAL   This may have changed:    - how to take this  - when to take this   Used for:  Yeast infection of the skin   Changed by:  Willie Nam MD        Apply topically daily   Quantity:  60 g   Refills:  3            Where to get your medicines      These medications were sent to [facility]/pharmacy #[id number] - [place], [place] - [address] [address]     Phone:  [phone]     ketoconazole 2 % cream                Primary Care Provider Office Phone # Fax #    Willie Nam [phone]       [place] [address]  [facility] [place] [address]        Thank you!     Thank you for choosing [facility]  for your care. Our goal is always to provide you with excellent care. Hearing back from our patients is one way we can continue to improve our services. Please take a few minutes to complete the written survey that you may receive in the mail after your visit with us. Thank you!             Your Updated Medication List - " Protect others around you: Learn how to safely use, store and throw away your medicines at www.disposemymeds.org.          This list is accurate as of: 5/26/17  8:48 AM.  Always use your most recent med list.                   Brand Name Dispense Instructions for use    amLODIPine 5 MG tablet    NORVASC    30 tablet    TAKE 1 TABLET (5 MG) BY MOUTH DAILY       aspirin 81 MG tablet      Take 1 tablet by mouth daily.       CIALIS 5 MG tablet   Generic drug:  tadalafil     30 tablet    Take 1 tablet by mouth as needed for erectile dysfunction. Never use with nitroglycerin, terazosin or doxazosin.       DAILY MULTIVITAMIN PO      Take  by mouth.       GLUCOSAMINE-CHONDROITIN PO      Take 1 tablet by mouth daily. 1000 mg       hydrochlorothiazide 12.5 MG capsule    MICROZIDE    90 capsule    Take 1 capsule (12.5 mg) by mouth daily       ketoconazole 2 % cream    NIZORAL    60 g    Apply topically daily       lisinopril 10 MG tablet    PRINIVIL/ZESTRIL    90 tablet    TAKE 1 TABLET BY MOUTH ONCE DAILY.       ranitidine 150 MG tablet    ZANTAC     Take 150 mg by mouth as needed.       rosuvastatin 20 MG tablet    CRESTOR    90 tablet    Take 1 tablet (20 mg) by mouth daily       VENTOLIN  (90 BASE) MCG/ACT Inhaler   Generic drug:  albuterol      Inhale 2 puffs into the lungs every 6 hours as needed for shortness of breath / dyspnea or wheezing

## 2017-05-26 NOTE — PROGRESS NOTES
Problem(s) Oriented visit        SUBJECTIVE:                                                    Peter Hernandez II is a 67 year old male who presents to clinic today for the following health issues :      1. Yeast infection of the skin  Needs a refill keeps under control  - ketoconazole (NIZORAL) 2 % cream; Apply topically daily  Dispense: 60 g; Refill: 3    2. Benign essential hypertension  he has Hypertension which is currently not well controlled. he has not yet started his HTZC and is here today to follow up on the this issue and see if we can't work on better control of the blood pressure.    Reviewed last 6 BP readings in chart:  BP Readings from Last 6 Encounters:   05/26/17 124/70   05/16/17 148/72   03/24/17 150/72   02/24/17 146/60   02/07/17 144/74   12/19/16 154/70     he  has not experienced any significant side effects from current medications for hypertension.    NO active cardiac complaints or symptoms with exercise.    - Basic Metabolic Panel (8) ([facility]); Future         Problem list, Medication list, Allergies, and Medical/Social/Surgical histories reviewed in EPIC and updated as appropriate.   Additional history: as documented    ROS:  General and Resp. completed and negative except as noted above  Toe nail gets loose at times  Histories:   Patient Active Problem List   Diagnosis     Hypercholesteremia     CAD (coronary artery disease)     ACP (advance care planning)     Esophageal reflux     Health Care Home     Family history of ischemic heart disease     Asthma     Benign essential hypertension     Past Surgical History:   Procedure Laterality Date     HEART CATH, ANGIOPLASTY  10/4/02    3.0 X 8 mm Velocity stent     ROTATOR CUFF REPAIR RT/LT  2009    Dr. Butler       Social History   Substance Use Topics     Smoking status: Never Smoker     Smokeless tobacco: Never Used     Alcohol use 0.5 oz/week     1 Standard drinks or equivalent per week      Comment: ocasionally     Family History  "  Problem Relation Age of Onset     C.A.D. Father      CANCER Father      bone     CEREBROVASCULAR DISEASE Mother            OBJECTIVE:                                                    /70  Pulse 58  Temp 98.1  F (36.7  C) (Oral)  Ht 1.835 m (6' 0.25\")  Wt 102.5 kg (226 lb)  SpO2 97%  BMI 30.44 kg/m2  Body mass index is 30.44 kg/(m^2).   APPEARANCE: = Relaxed and in no distress  Conj/Eyelids = noninjected and lids and lashes are without inflammation  PERRLA/Irises = Pupils Round Reactive to Light and Irisis without inflammation  Neck = No anterior or posterior adenopathy appreciated.  Thyroid = Not enlarged and no masses felt  Resp effort = Calm regular breathing  Breath Sounds = Good air movement with no rales or rhonchi in any lung fields  Heart Rate, Rythym, & sounds (no Murm)  = Regular rate and rythym with no S3, S4, or murmer appreciated.  Carotid Art's = Pulses full and equal and no bruits appreciated  Abdomen = Soft, nontender, no masses, & bowel sounds in all quadrants  Liver/Spleen = Normal span and no splenomegaly noted  Digits and Nails = FROM in all finger joints, no nail dystrophy  Ext (edema) = No pretibial edema noted or elsewhere  Musculsktl =  Strength and ROM of major joints are within normal limits  SKIN = absent significant rashes or lesions   Recent/Remote Memory = Alert and Oriented x 3  Mood/Affect = Cooperative and interested     ASSESSMENT/PLAN:                                                        Peter was seen today for recheck medication, hypertension and refill request.    Diagnoses and all orders for this visit:    Yeast infection of the skin  -     ketoconazole (NIZORAL) 2 % cream; Apply topically daily    Benign essential hypertension  -     Basic Metabolic Panel (8) ([facility]); Future    Still would like him to start the HCTZ and recheck blood in 10 days.    Regular exercise    The following health maintenance items are reviewed in Epic and correct as of " today:  Health Maintenance   Topic Date Due     PNEUMOCOCCAL (1 of 2 - PCV13) 04/16/2017     AORTIC ANEURYSM SCREENING (SYSTEM ASSIGNED)  04/16/2017     ASTHMA CONTROL TEST Q6 MOS  08/24/2017     INFLUENZA VACCINE (SYSTEM ASSIGNED)  09/01/2017     ASTHMA ACTION PLAN Q1 YR  12/19/2017     LIPID MONITORING Q1 YEAR  05/16/2018     ADVANCE DIRECTIVE PLANNING Q5 YRS  05/17/2018     FALL RISK ASSESSMENT  05/26/2018     COLON CANCER SCREEN (SYSTEM ASSIGNED)  07/18/2021     TETANUS IMMUNIZATION (SYSTEM ASSIGNED)  10/24/2023     HEPATITIS C SCREENING  Completed       Willie Nam MD  [facility]  Family Practice  [facility]  [phone]    For any issues my office # is [phone]

## 2017-06-08 ENCOUNTER — TRANSFERRED RECORDS (OUTPATIENT)
Dept: FAMILY MEDICINE | Facility: CLINIC | Age: 67
End: 2017-06-08

## 2017-06-09 DIAGNOSIS — I10 BENIGN ESSENTIAL HYPERTENSION: ICD-10-CM

## 2017-06-09 PROCEDURE — 80048 BASIC METABOLIC PNL TOTAL CA: CPT | Mod: 90 | Performed by: FAMILY MEDICINE

## 2017-06-09 PROCEDURE — 36415 COLL VENOUS BLD VENIPUNCTURE: CPT | Performed by: FAMILY MEDICINE

## 2017-06-10 LAB
BUN SERPL-MCNC: 17 MG/DL (ref 8–27)
BUN/CREATININE RATIO: 17 (ref 10–24)
CALCIUM SERPL-MCNC: 9.4 MG/DL (ref 8.6–10.2)
CHLORIDE SERPLBLD-SCNC: 102 MMOL/L (ref 96–106)
CREAT SERPL-MCNC: 0.99 MG/DL (ref 0.76–1.27)
EGFR IF AFRICN AM: 92 ML/MIN/1.73
EGFR IF NONAFRICN AM: 80 ML/MIN/1.73
GLUCOSE SERPL-MCNC: 113 MG/DL (ref 65–99)
POTASSIUM SERPL-SCNC: 4.5 MMOL/L (ref 3.5–5.2)
SODIUM SERPL-SCNC: 144 MMOL/L (ref 134–144)
TOTAL CO2: 26 MMOL/L (ref 18–28)

## 2017-06-23 DIAGNOSIS — Z76.0 ENCOUNTER FOR MEDICATION REFILL: ICD-10-CM

## 2017-06-23 NOTE — TELEPHONE ENCOUNTER
amlodipine last OV 5/26/17 last labs 6/9/17  Linda Collins MA June 23, 2017 9:50 AM    Last Basic Metabolic Panel:  Lab Results   Component Value Date     06/09/2017      Lab Results   Component Value Date    POTASSIUM 4.5 06/09/2017     Lab Results   Component Value Date    CHLORIDE 102 06/09/2017     Lab Results   Component Value Date    MARLENE 9.4 06/09/2017     Lab Results   Component Value Date    CO2 29 05/16/2017     Lab Results   Component Value Date    BUN 17 06/09/2017    BUN 17 06/09/2017     Lab Results   Component Value Date    CR 0.99 06/09/2017     Lab Results   Component Value Date     06/09/2017

## 2017-06-26 RX ORDER — AMLODIPINE BESYLATE 5 MG/1
TABLET ORAL
Qty: 30 TABLET | Refills: 1 | Status: SHIPPED | OUTPATIENT
Start: 2017-06-26 | End: 2017-08-15

## 2017-08-15 ENCOUNTER — OFFICE VISIT (OUTPATIENT)
Dept: CARDIOLOGY | Facility: CLINIC | Age: 67
End: 2017-08-15
Attending: NURSE PRACTITIONER
Payer: COMMERCIAL

## 2017-08-15 ENCOUNTER — DOCUMENTATION ONLY (OUTPATIENT)
Dept: CARDIOLOGY | Facility: CLINIC | Age: 67
End: 2017-08-15

## 2017-08-15 VITALS
HEIGHT: 72 IN | SYSTOLIC BLOOD PRESSURE: 126 MMHG | BODY MASS INDEX: 30.2 KG/M2 | WEIGHT: 223 LBS | DIASTOLIC BLOOD PRESSURE: 76 MMHG | HEART RATE: 58 BPM

## 2017-08-15 DIAGNOSIS — Z76.0 ENCOUNTER FOR MEDICATION REFILL: ICD-10-CM

## 2017-08-15 DIAGNOSIS — E78.00 PURE HYPERCHOLESTEROLEMIA: ICD-10-CM

## 2017-08-15 DIAGNOSIS — I10 ESSENTIAL HYPERTENSION: Primary | ICD-10-CM

## 2017-08-15 DIAGNOSIS — I25.10 CORONARY ARTERY DISEASE INVOLVING NATIVE CORONARY ARTERY OF NATIVE HEART WITHOUT ANGINA PECTORIS: Primary | ICD-10-CM

## 2017-08-15 PROCEDURE — 99214 OFFICE O/P EST MOD 30 MIN: CPT | Performed by: NURSE PRACTITIONER

## 2017-08-15 RX ORDER — LISINOPRIL 10 MG/1
TABLET ORAL
Qty: 90 TABLET | Refills: 3 | Status: SHIPPED | OUTPATIENT
Start: 2017-08-15 | End: 2018-08-21

## 2017-08-15 RX ORDER — AMLODIPINE BESYLATE 5 MG/1
5 TABLET ORAL DAILY
Qty: 90 TABLET | Refills: 3 | Status: SHIPPED | OUTPATIENT
Start: 2017-08-15 | End: 2018-08-21

## 2017-08-15 RX ORDER — ROSUVASTATIN CALCIUM 20 MG/1
20 TABLET, COATED ORAL DAILY
Qty: 90 TABLET | Refills: 3 | Status: SHIPPED | OUTPATIENT
Start: 2017-08-15 | End: 2018-08-08

## 2017-08-15 NOTE — PATIENT INSTRUCTIONS
No changes    See us back in February with fasting labs    I will check and see if you need a stress test in February and let you know

## 2017-08-15 NOTE — MR AVS SNAPSHOT
After Visit Summary   8/15/2017    Peter Hernandez II    MRN: [id number]           Patient Information     Date Of Birth          1950        Visit Information        Provider Department      8/15/2017 7:30 AM Clemente Nichols APRN CNP [facility]        Today's Diagnoses     Pure hypercholesterolemia    -  1    Essential hypertension        Encounter for medication refill          Care Instructions    No changes    See us back in February with fasting labs    I will check and see if you need a stress test in February and let you know          Follow-ups after your visit        Future tests that were ordered for you today     Open Future Orders        Priority Expected Expires Ordered    Basic metabolic panel Routine 2/11/2018 8/15/2018 8/15/2017    Lipid Profile Routine 2/11/2018 8/15/2018 8/15/2017    ALT Routine 2/11/2018 8/15/2018 8/15/2017            Who to contact     If you have questions or need follow up information about today's clinic visit or your schedule please contact [facility] directly at [phone].  Normal or non-critical lab and imaging results will be communicated to you by MyChart, letter or phone within 4 business days after the clinic has received the results. If you do not hear from us within 7 days, please contact the clinic through MyChart or phone. If you have a critical or abnormal lab result, we will notify you by phone as soon as possible.  Submit refill requests through ITC or call your pharmacy and they will forward the refill request to us. Please allow 3 business days for your refill to be completed.          Additional Information About Your Visit        [employer] Information     ITC gives you secure access to your electronic health record. If you see a primary care provider, you can also send messages to your care team and make appointments. If you have  "questions, please call your primary care clinic.  If you do not have a primary care provider, please call [phone] and they will assist you.        Care EveryWhere ID     This is your Care EveryWhere ID. This could be used by other organizations to access your [place] medical records  [id number]        Your Vitals Were     Pulse Height BMI (Body Mass Index)             58 1.835 m (6' 0.25\") 30.04 kg/m2          Blood Pressure from Last 3 Encounters:   08/15/17 126/76   05/26/17 124/70   05/16/17 148/72    Weight from Last 3 Encounters:   08/15/17 101.2 kg (223 lb)   05/26/17 102.5 kg (226 lb)   05/16/17 103 kg (227 lb)              We Performed the Following     Follow-Up with Cardiac Advanced Practice Provider          Today's Medication Changes          These changes are accurate as of: 8/15/17  8:02 AM.  If you have any questions, ask your nurse or doctor.               These medicines have changed or have updated prescriptions.        Dose/Directions    amLODIPine 5 MG tablet   Commonly known as:  NORVASC   This may have changed:  See the new instructions.   Used for:  Encounter for medication refill   Changed by:  Clemente Nichols, APRN CNP        Dose:  5 mg   Take 1 tablet (5 mg) by mouth daily   Quantity:  90 tablet   Refills:  3            Where to get your medicines      These medications were sent to [facility]/pharmacy #[id number] [facility] [address] [place] [address]     Phone:  [phone]     amLODIPine 5 MG tablet    lisinopril 10 MG tablet    rosuvastatin 20 MG tablet                Primary Care Provider Office Phone # Fax #    Willie Nam [phone] [address]  [facility] [address]        Equal Access to Services     FABIEN PHAN : Yulissa rhodeso Solitzy, waaxda luqadaha, qaybta kaalmada adebartoloyada, nicholas guthrie. So [facility] [phone].    ATENCIÓN: Si luis espchris, " tiene a jaquez disposición servicios gratuitos de asistencia lingüística. Phil bloom [phone].    We comply with applicable federal civil rights laws and [place] laws. We do not discriminate on the basis of race, color, national origin, age, disability sex, sexual orientation or gender identity.            Thank you!     Thank you for choosing [facility] [place]  for your care. Our goal is always to provide you with excellent care. Hearing back from our patients is one way we can continue to improve our services. Please take a few minutes to complete the written survey that you may receive in the mail after your visit with us. Thank you!             Your Updated Medication List - Protect others around you: Learn how to safely use, store and throw away your medicines at www.disposemymeds.org.          This list is accurate as of: 8/15/17  8:02 AM.  Always use your most recent med list.                   Brand Name Dispense Instructions for use Diagnosis    amLODIPine 5 MG tablet    NORVASC    90 tablet    Take 1 tablet (5 mg) by mouth daily    Encounter for medication refill       aspirin 81 MG tablet      Take 1 tablet by mouth daily.    Pain in shoulder       CIALIS 5 MG tablet   Generic drug:  tadalafil     30 tablet    Take 1 tablet by mouth as needed for erectile dysfunction. Never use with nitroglycerin, terazosin or doxazosin.    ED (erectile dysfunction)       DAILY MULTIVITAMIN PO      Take  by mouth.        GLUCOSAMINE-CHONDROITIN PO      Take 1 tablet by mouth daily. 1000 mg        hydrochlorothiazide 12.5 MG capsule    MICROZIDE    90 capsule    Take 1 capsule (12.5 mg) by mouth daily    Essential hypertension       ketoconazole 2 % cream    NIZORAL    60 g    Apply topically daily    Yeast infection of the skin       lisinopril 10 MG tablet    PRINIVIL/ZESTRIL    90 tablet    TAKE 1 TABLET BY MOUTH ONCE DAILY.    Encounter for medication refill       ranitidine 150 MG  tablet    ZANTAC     Take 150 mg by mouth as needed.        rosuvastatin 20 MG tablet    CRESTOR    90 tablet    Take 1 tablet (20 mg) by mouth daily    Pure hypercholesterolemia       VENTOLIN  (90 BASE) MCG/ACT Inhaler   Generic drug:  albuterol      Inhale 2 puffs into the lungs every 6 hours as needed for shortness of breath / dyspnea or wheezing

## 2017-08-15 NOTE — PROGRESS NOTES
HPI    Peter Hernandez is a pleasant 67-year-old gentleman who follows her with Dr. Braulio Blanca. He returns today to follow-up on his blood pressure after the addition of hydrochlorothiazide.    He has a history of coronary artery disease with right coronary artery stenting in 2012, 20% LAD lesions and 40% circumflex stenosis at the time. Stress echocardiogram done in 2015 showed no evidence of ischemia with preserved left ventricular function.      When he saw Dr. Blanca this spring he was complaining of facial flushing. A 5-HIAA urine test was performed and was negative for carcinoid. He is having no chest discomfort shortness of breath or activity intolerance. His activity is limited at times due to left-sided sciatica.    He had sweating around his eyes on high-dose lisinopril therefore it was reduced to 10 mg per day. His blood pressure elevated. He is also on amlodipine 5 mg a day with hydrochlorothiazide which we added at the last visit. His basic metabolic panel post addition of hydrochlorothiazide is normal. His blood pressure today and her primary care is been in the 120s.    He remains on a treatment regimen for dyslipidemia with Crestor 20 mg per day. LDL 64 HDL 53    He's actually doing very well today and has no cardiac complaints    Exam is outlined below      Impression and plan:     1. Coronary artery disease with right coronary stenting. He is asymptomatic. I will check with Dr. Blanca to see if he would like an ischemic workup in February as the patient is wondering. We will continue medical therapy. Left ventricular function is preserved.  2. Essential hypertension- controlled. Continue HCT, lisinopril, Norvasc. Check basic metabolic panel in the spring. Continue low-salt diet exercise.  3. Dyslipidemia controlled continue Crestor  4. Facial flushing has resolved urinary 5-HIAA urine test was unremarkable for carcinoid    We will see him back in Februrary    Orders Placed This  Encounter   Procedures     Basic metabolic panel     Lipid Profile     ALT       Orders Placed This Encounter   Medications     rosuvastatin (CRESTOR) 20 MG tablet     Sig: Take 1 tablet (20 mg) by mouth daily     Dispense:  90 tablet     Refill:  3     amLODIPine (NORVASC) 5 MG tablet     Sig: Take 1 tablet (5 mg) by mouth daily     Dispense:  90 tablet     Refill:  3     lisinopril (PRINIVIL/ZESTRIL) 10 MG tablet     Sig: TAKE 1 TABLET BY MOUTH ONCE DAILY.     Dispense:  90 tablet     Refill:  3       Medications Discontinued During This Encounter   Medication Reason     rosuvastatin (CRESTOR) 20 MG tablet Reorder     amLODIPine (NORVASC) 5 MG tablet Reorder     lisinopril (PRINIVIL/ZESTRIL) 10 MG tablet Reorder         Encounter Diagnoses   Name Primary?     Essential hypertension      Pure hypercholesterolemia Yes     Encounter for medication refill        CURRENT MEDICATIONS:  Current Outpatient Prescriptions   Medication Sig Dispense Refill     rosuvastatin (CRESTOR) 20 MG tablet Take 1 tablet (20 mg) by mouth daily 90 tablet 3     amLODIPine (NORVASC) 5 MG tablet Take 1 tablet (5 mg) by mouth daily 90 tablet 3     lisinopril (PRINIVIL/ZESTRIL) 10 MG tablet TAKE 1 TABLET BY MOUTH ONCE DAILY. 90 tablet 3     ketoconazole (NIZORAL) 2 % cream Apply topically daily 60 g 3     hydrochlorothiazide (MICROZIDE) 12.5 MG capsule Take 1 capsule (12.5 mg) by mouth daily 90 capsule 3     albuterol (VENTOLIN HFA) 108 (90 BASE) MCG/ACT inhaler Inhale 2 puffs into the lungs every 6 hours as needed for shortness of breath / dyspnea or wheezing       tadalafil (CIALIS) 5 MG tablet Take 1 tablet by mouth as needed for erectile dysfunction. Never use with nitroglycerin, terazosin or doxazosin. 30 tablet 1     Multiple Vitamin (DAILY MULTIVITAMIN PO) Take  by mouth.         aspirin 81 MG tablet Take 1 tablet by mouth daily.  3     GLUCOSAMINE-CHONDROITIN PO Take 1 tablet by mouth daily. 1000 mg         ranitidine (ZANTAC) 150 MG  tablet Take 150 mg by mouth as needed.       [DISCONTINUED] amLODIPine (NORVASC) 5 MG tablet TAKE 1 TABLET (5 MG) BY MOUTH DAILY 30 tablet 1     [DISCONTINUED] rosuvastatin (CRESTOR) 20 MG tablet Take 1 tablet (20 mg) by mouth daily 90 tablet 0     [DISCONTINUED] lisinopril (PRINIVIL/ZESTRIL) 10 MG tablet TAKE 1 TABLET BY MOUTH ONCE DAILY. 90 tablet 1       ALLERGIES     Allergies   Allergen Reactions     Simvastatin      achey       PAST MEDICAL HISTORY:  Past Medical History:   Diagnosis Date     ACP (advance care planning)     will bring copy in      Bruit      CAD (coronary artery disease) 2002    a stent     Esophageal reflux 5/17/2013     Family history of ischemic heart disease      Hyperlipidaemia      Hypertension        PAST SURGICAL HISTORY:  Past Surgical History:   Procedure Laterality Date     HEART CATH, ANGIOPLASTY  10/4/02    3.0 X 8 mm Velocity stent     ROTATOR CUFF REPAIR RT/LT  2009    Dr. Butler       FAMILY HISTORY:  Family History   Problem Relation Age of Onset     C.A.D. Father      CANCER Father      bone     CEREBROVASCULAR DISEASE Mother        SOCIAL HISTORY:  Social History     Social History     Marital status:      Spouse name: N/A     Number of children: N/A     Years of education: N/A     Occupational History     Finance      Social History Main Topics     Smoking status: Never Smoker     Smokeless tobacco: Never Used     Alcohol use 0.5 oz/week     1 Standard drinks or equivalent per week      Comment: ocasionally     Drug use: No     Sexual activity: Yes     Partners: Female     Other Topics Concern     Parent/Sibling W/ Cabg, Mi Or Angioplasty Before 65f 55m? No     Sleep Concern Yes     occ     Stress Concern No     Exercise Yes     walks daily     Social History Narrative       Review of Systems:  Skin:  Negative       Eyes:  Positive for glasses    ENT:  Negative      Respiratory:  Positive for dyspnea on exertion     Cardiovascular:  Negative      Gastroenterology:  "Negative      Genitourinary:  not assessed      Musculoskeletal:  Positive for back pain    Neurologic:  Negative      Psychiatric:  Negative      Heme/Lymph/Imm:  Positive for allergies    Endocrine:  Negative        Physical Exam:  Vitals: /76  Pulse 58  Ht 1.835 m (6' 0.25\")  Wt 101.2 kg (223 lb)  BMI 30.04 kg/m2    Constitutional:  cooperative, alert and oriented, well developed, well nourished, in no acute distress overweight      Skin:  warm and dry to the touch, no apparent skin lesions or masses noted        Head:  normocephalic, no masses or lesions        Eyes:  pupils equal and round, conjunctivae and lids unremarkable, sclera white, no xanthalasma, EOMS intact, no nystagmus        ENT:  no pallor or cyanosis, dentition good        Neck:  carotid pulses are full and equal bilaterally, JVP normal, no carotid bruit, no thyromegaly        Chest:  normal breath sounds, clear to auscultation, normal A-P diameter, normal symmetry, normal respiratory excursion, no use of accessory muscles          Cardiac: regular rhythm;normal S1 and S2;apical impulse not displaced;no murmurs, gallops or rubs detected   S4              Abdomen:  abdomen soft, non-tender, BS normoactive, no mass, no HSM, no bruits;abdomen soft        Vascular: pulses full and equal, no bruits auscultated                                        Extremities and Back:  no deformities, clubbing, cyanosis, erythema observed;no edema              Neurological:  affect appropriate, oriented to time, person and place;no gross motor deficits              JACK Nichols, APRN CNP  [address]  [place] [address]              "

## 2017-08-15 NOTE — LETTER
8/15/2017    Willie Nam MD  [address]  [facility] [address]      RE: Peter Hernandez BELLA       Dear Colleague,    I had the pleasure of seeing Peter Hernandez II in the [facility].    HPI    Peter Hernandez is a pleasant 67-year-old gentleman who follows her with Dr. Braulio Blanca. He returns today to follow-up on his blood pressure after the addition of hydrochlorothiazide.    He has a history of coronary artery disease with right coronary artery stenting in 2012. HEENT 20% LAD lesions and 40% circumflex stenosis at the time. Stress echocardiogram done in 2015 showed no evidence of ischemia with preserved left ventricular function.      When he saw Dr. Blanca this spring he was complaining of facial flushing. A 5-HIAA urine test was performed and was negative for carcinoid. He is having no chest discomfort shortness of breath or activity intolerance. His activity is limited at times due to left-sided sciatica.    He had sweating around his eyes on high-dose lisinopril therefore it was reduced to 10 mg per day. His blood pressure elevated. He is also on amlodipine 5 mg a day with hydrochlorothiazide which we added at the last visit. His basic metabolic panel post addition of hydrochlorothiazide is normal. His blood pressure today and her primary care is been in the 120s.    He remains on a treatment regimen for dyslipidemia with Crestor 20 mg per day. LDL 64 HDL 53    He's actually doing very well today and has no cardiac complaints    Exam is outlined below      Impression and plan:     1. Coronary artery disease with right coronary stenting. He is asymptomatic. I will check with Dr. Blanca to see if he would like an ischemic workup in February as the patient is wondering. We will continue medical therapy. Left ventricular function is preserved.  2. Essential hypertension- controlled. Continue HCT, lisinopril, Norvasc. Check basic metabolic panel  in the spring. Continue low-salt diet exercise.  3. Dyslipidemia controlled continue Crestor  4. Facial flushing has resolved to A5-HIAA urine test was unremarkable for carcinoid    We will see him back in Februrary    Orders Placed This Encounter   Procedures     Basic metabolic panel     Lipid Profile     ALT       Orders Placed This Encounter   Medications     rosuvastatin (CRESTOR) 20 MG tablet     Sig: Take 1 tablet (20 mg) by mouth daily     Dispense:  90 tablet     Refill:  3     amLODIPine (NORVASC) 5 MG tablet     Sig: Take 1 tablet (5 mg) by mouth daily     Dispense:  90 tablet     Refill:  3     lisinopril (PRINIVIL/ZESTRIL) 10 MG tablet     Sig: TAKE 1 TABLET BY MOUTH ONCE DAILY.     Dispense:  90 tablet     Refill:  3       Medications Discontinued During This Encounter   Medication Reason     rosuvastatin (CRESTOR) 20 MG tablet Reorder     amLODIPine (NORVASC) 5 MG tablet Reorder     lisinopril (PRINIVIL/ZESTRIL) 10 MG tablet Reorder         Encounter Diagnoses   Name Primary?     Essential hypertension      Pure hypercholesterolemia Yes     Encounter for medication refill        CURRENT MEDICATIONS:  Current Outpatient Prescriptions   Medication Sig Dispense Refill     rosuvastatin (CRESTOR) 20 MG tablet Take 1 tablet (20 mg) by mouth daily 90 tablet 3     amLODIPine (NORVASC) 5 MG tablet Take 1 tablet (5 mg) by mouth daily 90 tablet 3     lisinopril (PRINIVIL/ZESTRIL) 10 MG tablet TAKE 1 TABLET BY MOUTH ONCE DAILY. 90 tablet 3     ketoconazole (NIZORAL) 2 % cream Apply topically daily 60 g 3     hydrochlorothiazide (MICROZIDE) 12.5 MG capsule Take 1 capsule (12.5 mg) by mouth daily 90 capsule 3     albuterol (VENTOLIN HFA) 108 (90 BASE) MCG/ACT inhaler Inhale 2 puffs into the lungs every 6 hours as needed for shortness of breath / dyspnea or wheezing       tadalafil (CIALIS) 5 MG tablet Take 1 tablet by mouth as needed for erectile dysfunction. Never use with nitroglycerin, terazosin or doxazosin. 30  tablet 1     Multiple Vitamin (DAILY MULTIVITAMIN PO) Take  by mouth.         aspirin 81 MG tablet Take 1 tablet by mouth daily.  3     GLUCOSAMINE-CHONDROITIN PO Take 1 tablet by mouth daily. 1000 mg         ranitidine (ZANTAC) 150 MG tablet Take 150 mg by mouth as needed.       [DISCONTINUED] amLODIPine (NORVASC) 5 MG tablet TAKE 1 TABLET (5 MG) BY MOUTH DAILY 30 tablet 1     [DISCONTINUED] rosuvastatin (CRESTOR) 20 MG tablet Take 1 tablet (20 mg) by mouth daily 90 tablet 0     [DISCONTINUED] lisinopril (PRINIVIL/ZESTRIL) 10 MG tablet TAKE 1 TABLET BY MOUTH ONCE DAILY. 90 tablet 1       ALLERGIES     Allergies   Allergen Reactions     Simvastatin      achey       PAST MEDICAL HISTORY:  Past Medical History:   Diagnosis Date     ACP (advance care planning)     will bring copy in      Bruit      CAD (coronary artery disease) 2002    a stent     Esophageal reflux 5/17/2013     Family history of ischemic heart disease      Hyperlipidaemia      Hypertension        PAST SURGICAL HISTORY:  Past Surgical History:   Procedure Laterality Date     HEART CATH, ANGIOPLASTY  10/4/02    3.0 X 8 mm Velocity stent     ROTATOR CUFF REPAIR RT/LT  2009    Dr. Butler       FAMILY HISTORY:  Family History   Problem Relation Age of Onset     C.A.D. Father      CANCER Father      bone     CEREBROVASCULAR DISEASE Mother        SOCIAL HISTORY:  Social History     Social History     Marital status:      Spouse name: N/A     Number of children: N/A     Years of education: N/A     Occupational History     Finance      Social History Main Topics     Smoking status: Never Smoker     Smokeless tobacco: Never Used     Alcohol use 0.5 oz/week     1 Standard drinks or equivalent per week      Comment: ocasionally     Drug use: No     Sexual activity: Yes     Partners: Female     Other Topics Concern     Parent/Sibling W/ Cabg, Mi Or Angioplasty Before 65f 55m? No     Sleep Concern Yes     occ     Stress Concern No     Exercise Yes      "walks daily     Social History Narrative       Review of Systems:  Skin:  Negative       Eyes:  Positive for glasses    ENT:  Negative      Respiratory:  Positive for dyspnea on exertion     Cardiovascular:  Negative      Gastroenterology: Negative      Genitourinary:  not assessed      Musculoskeletal:  Positive for back pain    Neurologic:  Negative      Psychiatric:  Negative      Heme/Lymph/Imm:  Positive for allergies    Endocrine:  Negative        Physical Exam:  Vitals: /76  Pulse 58  Ht 1.835 m (6' 0.25\")  Wt 101.2 kg (223 lb)  BMI 30.04 kg/m2    Constitutional:  cooperative, alert and oriented, well developed, well nourished, in no acute distress overweight      Skin:  warm and dry to the touch, no apparent skin lesions or masses noted        Head:  normocephalic, no masses or lesions        Eyes:  pupils equal and round, conjunctivae and lids unremarkable, sclera white, no xanthalasma, EOMS intact, no nystagmus        ENT:  no pallor or cyanosis, dentition good        Neck:  carotid pulses are full and equal bilaterally, JVP normal, no carotid bruit, no thyromegaly        Chest:  normal breath sounds, clear to auscultation, normal A-P diameter, normal symmetry, normal respiratory excursion, no use of accessory muscles          Cardiac: regular rhythm;normal S1 and S2;apical impulse not displaced;no murmurs, gallops or rubs detected   S4              Abdomen:  abdomen soft, non-tender, BS normoactive, no mass, no HSM, no bruits;abdomen soft        Vascular: pulses full and equal, no bruits auscultated                                        Extremities and Back:  no deformities, clubbing, cyanosis, erythema observed;no edema              Neurological:  affect appropriate, oriented to time, person and place;no gross motor deficits            Thank you for allowing me to participate in the care of your patient.    Sincerely,     JEANNE Mathis [facility]

## 2017-08-15 NOTE — PROGRESS NOTES
Saw Mr David back    Blood pressure very well controlled now with addition of HCT to lisiniopril and norvasc    He had RCA stent in 2002 with 20% LAD, 40% cir    Negative steco 2015 and no symptoms  Had predictable symptoms prior to stent and none going on    He wonders if you want another stress test when he sees you in February    Let me know    thanks

## 2017-08-17 RX ORDER — NITROGLYCERIN 0.4 MG/1
TABLET SUBLINGUAL
Qty: 25 TABLET | Refills: 3 | Status: SHIPPED | OUTPATIENT
Start: 2017-08-17 | End: 2018-04-27

## 2017-08-17 NOTE — TELEPHONE ENCOUNTER
Call and let patient know that I spoke to Dr. Blanca    He does not think we need another stress test until 2019 unless patient has symptoms like before his previous stents    thx

## 2017-08-17 NOTE — PROGRESS NOTES
Rx for SL ntg sent. Pt did verbalize understanding that cialis and ntg cannot be taken w/in 72 hours of each other. Asked pharmacy to review w/ him as well.  Iliana Garcia [facility] RN 4:05 PM 08/17/17  [phone]

## 2017-08-17 NOTE — PROGRESS NOTES
LVM for pt requesting call back for non urgent message.  Iliana Garcia [facility] RN 3:27 PM 08/17/17  [phone]

## 2017-08-17 NOTE — PROGRESS NOTES
Reviewed KELLY's and Dr. GUTIERREZ's recs for stress testing in 2019, or sooner if angina occurs. He reports his anginal equivalent is L shoulder pain w/ activity, he has been sx free, I asked him to call if sx present.    He questions if he needs an rx for nitro. He has a bottle of  tabs. We discussed that he has rx for cialis and these 2 meds cannot be taken w/in 72 hrs of each other. Route to [facility] to see if nitro rx is appropriate.    Iliana Garcia [facility] RN 3:53 PM 17  [phone]

## 2017-08-21 DIAGNOSIS — Z76.0 ENCOUNTER FOR MEDICATION REFILL: ICD-10-CM

## 2017-08-21 RX ORDER — AMLODIPINE BESYLATE 5 MG/1
TABLET ORAL
Qty: 30 TABLET | Refills: 1 | Status: SHIPPED | OUTPATIENT
Start: 2017-08-21 | End: 2018-04-06

## 2018-04-06 ENCOUNTER — OFFICE VISIT (OUTPATIENT)
Dept: CARDIOLOGY | Facility: CLINIC | Age: 68
End: 2018-04-06
Attending: INTERNAL MEDICINE
Payer: COMMERCIAL

## 2018-04-06 VITALS
HEART RATE: 60 BPM | HEIGHT: 72 IN | DIASTOLIC BLOOD PRESSURE: 66 MMHG | WEIGHT: 223 LBS | BODY MASS INDEX: 30.2 KG/M2 | SYSTOLIC BLOOD PRESSURE: 126 MMHG

## 2018-04-06 DIAGNOSIS — I10 ESSENTIAL HYPERTENSION: ICD-10-CM

## 2018-04-06 DIAGNOSIS — N28.9 RENAL INSUFFICIENCY: Primary | ICD-10-CM

## 2018-04-06 DIAGNOSIS — Z76.0 ENCOUNTER FOR MEDICATION REFILL: ICD-10-CM

## 2018-04-06 DIAGNOSIS — R23.2 FACIAL FLUSHING: ICD-10-CM

## 2018-04-06 DIAGNOSIS — E78.00 PURE HYPERCHOLESTEROLEMIA: ICD-10-CM

## 2018-04-06 DIAGNOSIS — I25.10 CORONARY ARTERY DISEASE INVOLVING NATIVE CORONARY ARTERY OF NATIVE HEART WITHOUT ANGINA PECTORIS: ICD-10-CM

## 2018-04-06 LAB
ALT SERPL W P-5'-P-CCNC: <5 U/L (ref 5–30)
ANION GAP SERPL CALCULATED.3IONS-SCNC: 12.1 MMOL/L (ref 6–17)
BUN SERPL-MCNC: 16 MG/DL (ref 7–30)
CALCIUM SERPL-MCNC: 9.1 MG/DL (ref 8.5–10.5)
CHLORIDE SERPL-SCNC: 99 MMOL/L (ref 98–107)
CHOLEST SERPL-MCNC: 154 MG/DL
CO2 SERPL-SCNC: 31 MMOL/L (ref 23–29)
CREAT SERPL-MCNC: 1.34 MG/DL (ref 0.7–1.3)
GFR SERPL CREATININE-BSD FRML MDRD: 54 ML/MIN/1.7M2
GLUCOSE SERPL-MCNC: 103 MG/DL (ref 70–105)
HDLC SERPL-MCNC: 57 MG/DL
LDLC SERPL CALC-MCNC: 79 MG/DL
NONHDLC SERPL-MCNC: 97 MG/DL
POTASSIUM SERPL-SCNC: 4.1 MMOL/L (ref 3.5–5.1)
SODIUM SERPL-SCNC: 138 MMOL/L (ref 136–145)
TRIGL SERPL-MCNC: 92 MG/DL

## 2018-04-06 PROCEDURE — 80048 BASIC METABOLIC PNL TOTAL CA: CPT | Performed by: NURSE PRACTITIONER

## 2018-04-06 PROCEDURE — 84460 ALANINE AMINO (ALT) (SGPT): CPT | Performed by: NURSE PRACTITIONER

## 2018-04-06 PROCEDURE — 80061 LIPID PANEL: CPT | Performed by: NURSE PRACTITIONER

## 2018-04-06 PROCEDURE — 36415 COLL VENOUS BLD VENIPUNCTURE: CPT | Performed by: NURSE PRACTITIONER

## 2018-04-06 PROCEDURE — 99214 OFFICE O/P EST MOD 30 MIN: CPT | Performed by: INTERNAL MEDICINE

## 2018-04-06 NOTE — LETTER
4/6/2018    Willie Nam MD  [address]  [facility] [address]    RE: Peter Hernandez II       Dear Colleague,    I had the pleasure of seeing Peter Hernandez II in the [facility].    HPI and Plan:   See dictation    Orders Placed This Encounter   Procedures     Basic metabolic panel     Basic metabolic panel     Lipid Profile     ALT     Follow-Up with Cardiologist       No orders of the defined types were placed in this encounter.      Medications Discontinued During This Encounter   Medication Reason     amLODIPine (NORVASC) 5 MG tablet Medication Reconciliation Clean Up         Encounter Diagnoses   Name Primary?     Essential hypertension      Coronary artery disease involving native coronary artery of native heart without angina pectoris      Encounter for medication refill      Facial flushing      Renal insufficiency Yes       CURRENT MEDICATIONS:  Current Outpatient Prescriptions   Medication Sig Dispense Refill     nitroGLYcerin (NITROSTAT) 0.4 MG sublingual tablet For chest pain place 1 tablet under the tongue every 5 minutes for 3 doses. If symptoms persist 5 minutes after 1st dose call 911. 25 tablet 3     rosuvastatin (CRESTOR) 20 MG tablet Take 1 tablet (20 mg) by mouth daily 90 tablet 3     amLODIPine (NORVASC) 5 MG tablet Take 1 tablet (5 mg) by mouth daily 90 tablet 3     lisinopril (PRINIVIL/ZESTRIL) 10 MG tablet TAKE 1 TABLET BY MOUTH ONCE DAILY. 90 tablet 3     ketoconazole (NIZORAL) 2 % cream Apply topically daily 60 g 3     hydrochlorothiazide (MICROZIDE) 12.5 MG capsule Take 1 capsule (12.5 mg) by mouth daily 90 capsule 3     albuterol (VENTOLIN HFA) 108 (90 BASE) MCG/ACT inhaler Inhale 2 puffs into the lungs every 6 hours as needed for shortness of breath / dyspnea or wheezing       tadalafil (CIALIS) 5 MG tablet Take 1 tablet by mouth as needed for erectile dysfunction. Never use with nitroglycerin, terazosin or doxazosin. 30 tablet 1      Multiple Vitamin (DAILY MULTIVITAMIN PO) Take  by mouth.         aspirin 81 MG tablet Take 1 tablet by mouth daily.  3     GLUCOSAMINE-CHONDROITIN PO Take 1 tablet by mouth daily. 1000 mg         ranitidine (ZANTAC) 150 MG tablet Take 150 mg by mouth as needed.       [DISCONTINUED] amLODIPine (NORVASC) 5 MG tablet TAKE 1 TABLET (5 MG) BY MOUTH DAILY 30 tablet 1       ALLERGIES     Allergies   Allergen Reactions     Simvastatin      achey       PAST MEDICAL HISTORY:  Past Medical History:   Diagnosis Date     ACP (advance care planning)     will bring copy in      Bruit      CAD (coronary artery disease) 2002    a stent     Esophageal reflux 5/17/2013     Family history of ischemic heart disease      Hyperlipidaemia      Hypertension        PAST SURGICAL HISTORY:  Past Surgical History:   Procedure Laterality Date     HEART CATH, ANGIOPLASTY  10/4/02    3.0 X 8 mm Velocity stent     ROTATOR CUFF REPAIR RT/LT  2009    Dr. Butler       FAMILY HISTORY:  Family History   Problem Relation Age of Onset     C.A.D. Father      CANCER Father      bone     CEREBROVASCULAR DISEASE Mother        SOCIAL HISTORY:  Social History     Social History     Marital status:      Spouse name: N/A     Number of children: N/A     Years of education: N/A     Occupational History     Finance      Social History Main Topics     Smoking status: Never Smoker     Smokeless tobacco: Never Used     Alcohol use 0.5 oz/week     1 Standard drinks or equivalent per week      Comment: ocasionally     Drug use: No     Sexual activity: Yes     Partners: Female     Other Topics Concern     Parent/Sibling W/ Cabg, Mi Or Angioplasty Before 65f 55m? No     Sleep Concern Yes     occ     Stress Concern No     Exercise Yes     walks daily     Social History Narrative       Review of Systems:  Skin:  Negative       Eyes:  Positive for glasses    ENT:  Negative      Respiratory:  Positive for dyspnea on exertion asthma    Cardiovascular:  Negative     "  Gastroenterology: Negative      Genitourinary:  not assessed      Musculoskeletal:  Positive for back pain    Neurologic:  Negative      Psychiatric:  Negative      Heme/Lymph/Imm:  Positive for allergies    Endocrine:  Negative        Physical Exam:  Vitals: /66 (BP Location: Left arm, Patient Position: Sitting, Cuff Size: Adult Regular)  Pulse 60  Ht 1.835 m (6' 0.25\")  Wt 101.2 kg (223 lb)  BMI 30.04 kg/m2    Constitutional:  cooperative, alert and oriented, well developed, well nourished, in no acute distress overweight      Skin:  warm and dry to the touch, no apparent skin lesions or masses noted          Head:  normocephalic, no masses or lesions        Eyes:  pupils equal and round, conjunctivae and lids unremarkable, sclera white, no xanthalasma, EOMS intact, no nystagmus        Lymph:No Cervical lymphadenopathy present     ENT:  no pallor or cyanosis, dentition good        Neck:  carotid pulses are full and equal bilaterally, JVP normal, no carotid bruit        Respiratory:  normal breath sounds, clear to auscultation, normal A-P diameter, normal symmetry, normal respiratory excursion, no use of accessory muscles         Cardiac: regular rhythm;normal S1 and S2;apical impulse not displaced;no murmurs, gallops or rubs detected   S4            pulses full and equal, no bruits auscultated                                        GI:  abdomen soft, non-tender, BS normoactive, no mass, no HSM, no bruits;abdomen soft        Extremities and Muscular Skeletal:  no deformities, clubbing, cyanosis, erythema observed;no edema              Neurological:  no gross motor deficits;affect appropriate        Psych:  Alert and Oriented x 3        CC  Braulio Blanca MD  [address] [address]                Thank you for allowing me to participate in the care of your patient.      Sincerely,     Braulio Blanca MD, MD     [facility]    cc: "   Braulio Blanca MD  [address]  [place] [id number]

## 2018-04-06 NOTE — PROGRESS NOTES
Service Date: 04/06/2018      HISTORY OF PRESENT ILLNESS:  It is my pleasure to see your patient, Peter Hernandez, who is a very pleasant 67-year-old gentleman with a past history of coronary artery disease and essential hypertension.  If you remember, in 2012 he had stenting of the right coronary artery.  He also had 20% LAD lesions and a 40% circumflex stenosis.  In 12/2015, he had a stress echocardiogram showing no evidence of ischemia.  If you remember last year he began to develop essential hypertension.  We did add in 12.5 mg of hydrochlorothiazide and his blood pressure is now much better controlled.  Initially his blood pressure was high when he came into the office in the 148 range, but I rechecked it at the end of our visit and it dropped down to 126/66.        His lipid profile is not as good as it was previously.  The LDL has risen from 64 to 79.  His HDL is still very good, in fact it is better than it was, previously going from 53 to 57 and his triglycerides are normal at 92.      One finding today was unusual in that his creatinine is higher than previously with the creatinine going from 0.99 to 1.34.  Hydrochlorothiazide was added in 05/2017 and we rechecked his basic metabolic profile in June, and his creatinine in fact has improved, so I am not sure that this is the hydrochlorothiazide that is causing his creatinine to go to 1.34.  His BUN was entirely normal at 16 and his GFR is 54 with this.        He is not complaining of any chest pains or chest pressure.  He is not unduly short of breath.  He does complain of some flushing in his face.  However, we did check for carcinoid syndrome and he has no evidence of this on measurement of urinary 5-HIAA.        IMPRESSION:      1.  Coronary artery disease.  The patient is asymptomatic with respect to coronary artery disease with no symptoms of angina pectoris.   2.  Essential hypertension.  His blood pressure is well controlled at present.   3.  Mild  renal insufficiency, the cause of which is unclear.  There is a possibility that hydrochlorothiazide is causing this.  However, a month after this drug was introduced, his kidney function was completely normal.   4.  Facial flushing.  The cause of this is unclear, but we did rule out carcinoid syndrome.   5.  Reasonably good lipid profile, although his LDL has risen for some reason.      PLAN:  We will recheck kidney function in approximately 2 weeks' time.  I have asked him to hydrate.  I have also asked him to avoid salt.  He should adhere to a low-cholesterol diet and continue to exercise.  I will see him myself in 1 year's time unless there is worsening renal function.        It has been my pleasure to be involved in the care of this very nice patient.  As always, he has been told to contact us if he has any questions or any concerns.      cc:   Willie Nam MD    [facility]   [address] [place] [address]         MONY FREEMAN MD, [facility]             D: 2018   T: 2018   MT: AFSANEH      Name:     NATALY CONTRERAS   MRN:      [id number]        Account:      [id number]   :      1950           Service Date: 2018      Document: [id number]

## 2018-04-06 NOTE — PROGRESS NOTES
HPI and Plan:   See dictation    Orders Placed This Encounter   Procedures     Basic metabolic panel     Basic metabolic panel     Lipid Profile     ALT     Follow-Up with Cardiologist       No orders of the defined types were placed in this encounter.      Medications Discontinued During This Encounter   Medication Reason     amLODIPine (NORVASC) 5 MG tablet Medication Reconciliation Clean Up         Encounter Diagnoses   Name Primary?     Essential hypertension      Coronary artery disease involving native coronary artery of native heart without angina pectoris      Encounter for medication refill      Facial flushing      Renal insufficiency Yes       CURRENT MEDICATIONS:  Current Outpatient Prescriptions   Medication Sig Dispense Refill     nitroGLYcerin (NITROSTAT) 0.4 MG sublingual tablet For chest pain place 1 tablet under the tongue every 5 minutes for 3 doses. If symptoms persist 5 minutes after 1st dose call 911. 25 tablet 3     rosuvastatin (CRESTOR) 20 MG tablet Take 1 tablet (20 mg) by mouth daily 90 tablet 3     amLODIPine (NORVASC) 5 MG tablet Take 1 tablet (5 mg) by mouth daily 90 tablet 3     lisinopril (PRINIVIL/ZESTRIL) 10 MG tablet TAKE 1 TABLET BY MOUTH ONCE DAILY. 90 tablet 3     ketoconazole (NIZORAL) 2 % cream Apply topically daily 60 g 3     hydrochlorothiazide (MICROZIDE) 12.5 MG capsule Take 1 capsule (12.5 mg) by mouth daily 90 capsule 3     albuterol (VENTOLIN HFA) 108 (90 BASE) MCG/ACT inhaler Inhale 2 puffs into the lungs every 6 hours as needed for shortness of breath / dyspnea or wheezing       tadalafil (CIALIS) 5 MG tablet Take 1 tablet by mouth as needed for erectile dysfunction. Never use with nitroglycerin, terazosin or doxazosin. 30 tablet 1     Multiple Vitamin (DAILY MULTIVITAMIN PO) Take  by mouth.         aspirin 81 MG tablet Take 1 tablet by mouth daily.  3     GLUCOSAMINE-CHONDROITIN PO Take 1 tablet by mouth daily. 1000 mg         ranitidine (ZANTAC) 150 MG tablet Take  150 mg by mouth as needed.       [DISCONTINUED] amLODIPine (NORVASC) 5 MG tablet TAKE 1 TABLET (5 MG) BY MOUTH DAILY 30 tablet 1       ALLERGIES     Allergies   Allergen Reactions     Simvastatin      achey       PAST MEDICAL HISTORY:  Past Medical History:   Diagnosis Date     ACP (advance care planning)     will bring copy in      Bruit      CAD (coronary artery disease) 2002    a stent     Esophageal reflux 5/17/2013     Family history of ischemic heart disease      Hyperlipidaemia      Hypertension        PAST SURGICAL HISTORY:  Past Surgical History:   Procedure Laterality Date     HEART CATH, ANGIOPLASTY  10/4/02    3.0 X 8 mm Velocity stent     ROTATOR CUFF REPAIR RT/LT  2009    Dr. Butler       FAMILY HISTORY:  Family History   Problem Relation Age of Onset     C.A.D. Father      CANCER Father      bone     CEREBROVASCULAR DISEASE Mother        SOCIAL HISTORY:  Social History     Social History     Marital status:      Spouse name: N/A     Number of children: N/A     Years of education: N/A     Occupational History     Finance      Social History Main Topics     Smoking status: Never Smoker     Smokeless tobacco: Never Used     Alcohol use 0.5 oz/week     1 Standard drinks or equivalent per week      Comment: ocasionally     Drug use: No     Sexual activity: Yes     Partners: Female     Other Topics Concern     Parent/Sibling W/ Cabg, Mi Or Angioplasty Before 65f 55m? No     Sleep Concern Yes     occ     Stress Concern No     Exercise Yes     walks daily     Social History Narrative       Review of Systems:  Skin:  Negative       Eyes:  Positive for glasses    ENT:  Negative      Respiratory:  Positive for dyspnea on exertion asthma    Cardiovascular:  Negative      Gastroenterology: Negative      Genitourinary:  not assessed      Musculoskeletal:  Positive for back pain    Neurologic:  Negative      Psychiatric:  Negative      Heme/Lymph/Imm:  Positive for allergies    Endocrine:  Negative     "    Physical Exam:  Vitals: /66 (BP Location: Left arm, Patient Position: Sitting, Cuff Size: Adult Regular)  Pulse 60  Ht 1.835 m (6' 0.25\")  Wt 101.2 kg (223 lb)  BMI 30.04 kg/m2    Constitutional:  cooperative, alert and oriented, well developed, well nourished, in no acute distress overweight      Skin:  warm and dry to the touch, no apparent skin lesions or masses noted          Head:  normocephalic, no masses or lesions        Eyes:  pupils equal and round, conjunctivae and lids unremarkable, sclera white, no xanthalasma, EOMS intact, no nystagmus        Lymph:No Cervical lymphadenopathy present     ENT:  no pallor or cyanosis, dentition good        Neck:  carotid pulses are full and equal bilaterally, JVP normal, no carotid bruit        Respiratory:  normal breath sounds, clear to auscultation, normal A-P diameter, normal symmetry, normal respiratory excursion, no use of accessory muscles         Cardiac: regular rhythm;normal S1 and S2;apical impulse not displaced;no murmurs, gallops or rubs detected   S4            pulses full and equal, no bruits auscultated                                        GI:  abdomen soft, non-tender, BS normoactive, no mass, no HSM, no bruits;abdomen soft        Extremities and Muscular Skeletal:  no deformities, clubbing, cyanosis, erythema observed;no edema              Neurological:  no gross motor deficits;affect appropriate        Psych:  Alert and Oriented x 3        CC  Braulio Blanca MD  [address]  [place] [address]              "

## 2018-04-06 NOTE — LETTER
4/6/2018      Willie Nam MD  [address]  [facility] [address]      RE: Peter Hernandez II       Dear Colleague,    I had the pleasure of seeing Peter Hernandez II in the [facility].    Service Date: 04/06/2018      HISTORY OF PRESENT ILLNESS:  It is my pleasure to see your patient, Peter Hernandez, who is a very pleasant 67-year-old gentleman with a past history of coronary artery disease and essential hypertension.  If you remember, in 2012 he had stenting of the right coronary artery.  He also had 20% LAD lesions and a 40% circumflex stenosis.  In 12/2015, he had a stress echocardiogram showing no evidence of ischemia.  If you remember last year he began to develop essential hypertension.  We did add in 12.5 mg of hydrochlorothiazide and his blood pressure is now much better controlled.  Initially his blood pressure was high when he came into the office in the 148 range, but I rechecked it at the end of our visit and it dropped down to 126/66.        His lipid profile is not as good as it was previously.  The LDL has risen from 64 to 79.  His HDL is still very good, in fact it is better than it was, previously going from 53 to 57 and his triglycerides are normal at 92.      One finding today was unusual in that his creatinine is higher than previously with the creatinine going from 0.99 to 1.34.  Hydrochlorothiazide was added in 05/2017 and we rechecked his basic metabolic profile in June, and his creatinine in fact has improved, so I am not sure that this is the hydrochlorothiazide that is causing his creatinine to go to 1.34.  His BUN was entirely normal at 16 and his GFR is 54 with this.        He is not complaining of any chest pains or chest pressure.  He is not unduly short of breath.  He does complain of some flushing in his face.  However, we did check for carcinoid syndrome and he has no evidence of this on measurement of urinary 5-HIAA.         IMPRESSION:      1.  Coronary artery disease.  The patient is asymptomatic with respect to coronary artery disease with no symptoms of angina pectoris.   2.  Essential hypertension.  His blood pressure is well controlled at present.   3.  Mild renal insufficiency, the cause of which is unclear.  There is a possibility that hydrochlorothiazide is causing this.  However, a month after this drug was introduced, his kidney function was completely normal.   4.  Facial flushing.  The cause of this is unclear, but we did rule out carcinoid syndrome.   5.  Reasonably good lipid profile, although his LDL has risen for some reason.      PLAN:  We will recheck kidney function in approximately 2 weeks' time.  I have asked him to hydrate.  I have also asked him to avoid salt.  He should adhere to a low-cholesterol diet and continue to exercise.  I will see him myself in 1 year's time unless there is worsening renal function.        It has been my pleasure to be involved in the care of this very nice patient.  As always, he has been told to contact us if he has any questions or any concerns.      cc:   Willie Nam MD    [facility]   [address] [place] [address]         MONY FREEMAN MD, [facility]             D: 2018   T: 2018   MT: AFSANEH      Name:     NATALY CONTRERAS   MRN:      [id number]        Account:      [id number]   :      1950           Service Date: 2018      Document: [id number]         Outpatient Encounter Prescriptions as of 2018   Medication Sig Dispense Refill     nitroGLYcerin (NITROSTAT) 0.4 MG sublingual tablet For chest pain place 1 tablet under the tongue every 5 minutes for 3 doses. If symptoms persist 5 minutes after 1st dose call 911. 25 tablet 3     rosuvastatin (CRESTOR) 20 MG tablet Take 1 tablet (20 mg) by mouth daily 90 tablet 3     amLODIPine (NORVASC) 5 MG tablet Take 1 tablet (5 mg) by mouth daily 90 tablet 3     lisinopril  (PRINIVIL/ZESTRIL) 10 MG tablet TAKE 1 TABLET BY MOUTH ONCE DAILY. 90 tablet 3     ketoconazole (NIZORAL) 2 % cream Apply topically daily 60 g 3     hydrochlorothiazide (MICROZIDE) 12.5 MG capsule Take 1 capsule (12.5 mg) by mouth daily 90 capsule 3     albuterol (VENTOLIN HFA) 108 (90 BASE) MCG/ACT inhaler Inhale 2 puffs into the lungs every 6 hours as needed for shortness of breath / dyspnea or wheezing       tadalafil (CIALIS) 5 MG tablet Take 1 tablet by mouth as needed for erectile dysfunction. Never use with nitroglycerin, terazosin or doxazosin. 30 tablet 1     Multiple Vitamin (DAILY MULTIVITAMIN PO) Take  by mouth.         aspirin 81 MG tablet Take 1 tablet by mouth daily.  3     GLUCOSAMINE-CHONDROITIN PO Take 1 tablet by mouth daily. 1000 mg         ranitidine (ZANTAC) 150 MG tablet Take 150 mg by mouth as needed.       [DISCONTINUED] amLODIPine (NORVASC) 5 MG tablet TAKE 1 TABLET (5 MG) BY MOUTH DAILY 30 tablet 1     No facility-administered encounter medications on file as of 4/6/2018.        Again, thank you for allowing me to participate in the care of your patient.      Sincerely,    Braulio Blanca MD, MD     [facility]

## 2018-04-06 NOTE — MR AVS SNAPSHOT
After Visit Summary   4/6/2018    Peter Hernandez II    MRN: [id number]           Patient Information     Date Of Birth          1950        Visit Information        Provider Department      4/6/2018 9:45 AM Braulio Blanca MD [facility]   [place]        Today's Diagnoses     Renal insufficiency    -  1    Essential hypertension        Coronary artery disease involving native coronary artery of native heart without angina pectoris        Encounter for medication refill        Facial flushing           Follow-ups after your visit        Additional Services     Follow-Up with Cardiologist                 Your next 10 appointments already scheduled     Apr 20, 2018  7:30 AM CDT   LAB with [facility]   [facility] ([facility])    [address] [place], [place] [id number]  [place]  [place] [id number] [phone]           Please do not eat 10-12 hours before your appointment if you are coming in fasting for labs on lipids, cholesterol, or glucose (sugar). This does not apply to pregnant women. Water, hot tea and black coffee (with nothing added) are okay. Do not drink other fluids, diet soda or chew gum.            Apr 27, 2018  8:15 AM CDT   SHORT with Willie Nam MD   [facility] ([facility])    [address] [place] [id number] [phone]            Sep 10, 2018  7:45 AM CDT   PHYSICAL with Willie Nam MD   [facility] ([facility])    [address] [place] [id number] [phone]              Future tests that were ordered for you today     Open Future Orders        Priority Expected Expires Ordered    Basic metabolic panel Routine 4/6/2019 4/7/2019 4/6/2018    Lipid Profile Routine 4/6/2019 4/6/2019 4/6/2018    ALT Routine 4/6/2019 4/6/2019 4/6/2018    Follow-Up with Cardiologist Routine 4/6/2019 4/7/2019 4/6/2018    Basic  "metabolic panel Routine 4/20/2018 4/6/2019 4/6/2018            Who to contact     If you have questions or need follow up information about today's clinic visit or your schedule please contact [facility] directly at [phone].  Normal or non-critical lab and imaging results will be communicated to you by MyChart, letter or phone within 4 business days after the clinic has received the results. If you do not hear from us within 7 days, please contact the clinic through [employer] or phone. If you have a critical or abnormal lab result, we will notify you by phone as soon as possible.  Submit refill requests through [employer] or call your pharmacy and they will forward the refill request to us. Please allow 3 business days for your refill to be completed.          Additional Information About Your Visit        [employer] Information     [employer] gives you secure access to your electronic health record. If you see a primary care provider, you can also send messages to your care team and make appointments. If you have questions, please call your primary care clinic.  If you do not have a primary care provider, please call [phone] and they will assist you.        Care EveryWhere ID     This is your Care EveryWhere ID. This could be used by other organizations to access your [place] medical records  [id number]        Your Vitals Were     Pulse Height BMI (Body Mass Index)             60 1.835 m (6' 0.25\") 30.04 kg/m2          Blood Pressure from Last 3 Encounters:   04/06/18 126/66   08/15/17 126/76   05/26/17 124/70    Weight from Last 3 Encounters:   04/06/18 101.2 kg (223 lb)   08/15/17 101.2 kg (223 lb)   05/26/17 102.5 kg (226 lb)              We Performed the Following     Follow-Up with Cardiologist        Primary Care Provider Office Phone # Fax #    Willie Nam [phone] [address]  [facility] [address]        Equal Access to " Services     [facility]: Hadii aad ku hadsaranyajaneen Susielitzy, waaxda luqadaha, qaybta kaalmada familia, nicholas larry . So [facility] [phone].    ATENCIÓN: Si habla gelacio, tiene a jaquez disposición servicios gratuitos de asistencia lingüística. Llame al [phone].    We comply with applicable federal civil rights laws and [place] laws. We do not discriminate on the basis of race, color, national origin, age, disability, sex, sexual orientation, or gender identity.            Thank you!     Thank you for choosing [facility]  for your care. Our goal is always to provide you with excellent care. Hearing back from our patients is one way we can continue to improve our services. Please take a few minutes to complete the written survey that you may receive in the mail after your visit with us. Thank you!             Your Updated Medication List - Protect others around you: Learn how to safely use, store and throw away your medicines at www.disposemymeds.org.          This list is accurate as of 4/6/18  9:51 AM.  Always use your most recent med list.                   Brand Name Dispense Instructions for use Diagnosis    amLODIPine 5 MG tablet    NORVASC    90 tablet    Take 1 tablet (5 mg) by mouth daily    Encounter for medication refill       aspirin 81 MG tablet      Take 1 tablet by mouth daily.    Pain in shoulder       CIALIS 5 MG tablet   Generic drug:  tadalafil     30 tablet    Take 1 tablet by mouth as needed for erectile dysfunction. Never use with nitroglycerin, terazosin or doxazosin.    ED (erectile dysfunction)       DAILY MULTIVITAMIN PO      Take  by mouth.        GLUCOSAMINE-CHONDROITIN PO      Take 1 tablet by mouth daily. 1000 mg        hydrochlorothiazide 12.5 MG capsule    MICROZIDE    90 capsule    Take 1 capsule (12.5 mg) by mouth daily    Essential hypertension       ketoconazole 2 % cream    NIZORAL    60 g    Apply topically  daily    Yeast infection of the skin       lisinopril 10 MG tablet    PRINIVIL/ZESTRIL    90 tablet    TAKE 1 TABLET BY MOUTH ONCE DAILY.    Encounter for medication refill       nitroGLYcerin 0.4 MG sublingual tablet    NITROSTAT    25 tablet    For chest pain place 1 tablet under the tongue every 5 minutes for 3 doses. If symptoms persist 5 minutes after 1st dose call 911.    Coronary artery disease involving native coronary artery of native heart without angina pectoris       ranitidine 150 MG tablet    ZANTAC     Take 150 mg by mouth as needed.        rosuvastatin 20 MG tablet    CRESTOR    90 tablet    Take 1 tablet (20 mg) by mouth daily    Pure hypercholesterolemia       VENTOLIN  (90 BASE) MCG/ACT Inhaler   Generic drug:  albuterol      Inhale 2 puffs into the lungs every 6 hours as needed for shortness of breath / dyspnea or wheezing

## 2018-04-20 DIAGNOSIS — I25.10 CORONARY ARTERY DISEASE INVOLVING NATIVE CORONARY ARTERY OF NATIVE HEART WITHOUT ANGINA PECTORIS: ICD-10-CM

## 2018-04-20 DIAGNOSIS — N28.9 RENAL INSUFFICIENCY: ICD-10-CM

## 2018-04-20 LAB
ALT SERPL W P-5'-P-CCNC: <5 U/L (ref 5–30)
ANION GAP SERPL CALCULATED.3IONS-SCNC: 12.9 MMOL/L (ref 6–17)
BUN SERPL-MCNC: 15 MG/DL (ref 7–30)
CALCIUM SERPL-MCNC: 10 MG/DL (ref 8.5–10.5)
CHLORIDE SERPL-SCNC: 101 MMOL/L (ref 98–107)
CHOLEST SERPL-MCNC: 141 MG/DL
CO2 SERPL-SCNC: 29 MMOL/L (ref 23–29)
CREAT SERPL-MCNC: 1.27 MG/DL (ref 0.7–1.3)
GFR SERPL CREATININE-BSD FRML MDRD: 57 ML/MIN/1.7M2
GLUCOSE SERPL-MCNC: 121 MG/DL (ref 70–105)
HDLC SERPL-MCNC: 56 MG/DL
LDLC SERPL CALC-MCNC: 66 MG/DL
NONHDLC SERPL-MCNC: 85 MG/DL
POTASSIUM SERPL-SCNC: 3.9 MMOL/L (ref 3.5–5.1)
SODIUM SERPL-SCNC: 139 MMOL/L (ref 136–145)
TRIGL SERPL-MCNC: 95 MG/DL

## 2018-04-20 PROCEDURE — 80048 BASIC METABOLIC PNL TOTAL CA: CPT | Performed by: INTERNAL MEDICINE

## 2018-04-20 PROCEDURE — 36415 COLL VENOUS BLD VENIPUNCTURE: CPT | Performed by: INTERNAL MEDICINE

## 2018-04-20 NOTE — LETTER
April 23, 2018     TO: Peter Allen David II   [address] [place] [address]     Dear Mr. Hernandez,  The results of your recent Laboratory tests.  Results for orders placed or performed in visit on 04/20/18   Basic metabolic panel   Result Value Ref Range    Sodium 139 136 - 145 mmol/L    Potassium 3.9 3.5 - 5.1 mmol/L    Chloride 101 98 - 107 mmol/L    Carbon Dioxide 29 23 - 29 mmol/L    Anion Gap 12.9 6 - 17 mmol/L    Glucose 121 (H) 70 - 105 mg/dL    Urea Nitrogen 15 7 - 30 mg/dL    Creatinine 1.27 0.70 - 1.30 mg/dL    GFR Estimate 57 (L) >60 mL/min/1.7m2    GFR Estimate If Black 69 >60 mL/min/1.7m2    Calcium 10.0 8.5 - 10.5 mg/dL   Lipid Profile   Result Value Ref Range    Cholesterol 141 <200 mg/dL    Triglycerides 95 <150 mg/dL    HDL Cholesterol 56 >39 mg/dL    LDL Cholesterol Calculated 66 <100 mg/dL    Non HDL Cholesterol 85 <130 mg/dL   ALT   Result Value Ref Range    ALT <5 (L) 5 - 30 U/L   Your test results fall within the expected range(s) or remain unchanged from previous results.  Please continue with current treatment plan. Please call [phone] with questions or concerns.     Sincerely,  [facility]

## 2018-04-27 ENCOUNTER — OFFICE VISIT (OUTPATIENT)
Dept: FAMILY MEDICINE | Facility: CLINIC | Age: 68
End: 2018-04-27

## 2018-04-27 VITALS
DIASTOLIC BLOOD PRESSURE: 66 MMHG | WEIGHT: 222.8 LBS | HEART RATE: 56 BPM | HEIGHT: 72 IN | RESPIRATION RATE: 12 BRPM | BODY MASS INDEX: 30.18 KG/M2 | OXYGEN SATURATION: 96 % | SYSTOLIC BLOOD PRESSURE: 130 MMHG

## 2018-04-27 DIAGNOSIS — I25.10 CORONARY ARTERY DISEASE INVOLVING NATIVE CORONARY ARTERY OF NATIVE HEART WITHOUT ANGINA PECTORIS: ICD-10-CM

## 2018-04-27 DIAGNOSIS — Z23 NEED FOR VACCINATION: ICD-10-CM

## 2018-04-27 DIAGNOSIS — M54.42 CHRONIC BILATERAL LOW BACK PAIN WITH LEFT-SIDED SCIATICA: ICD-10-CM

## 2018-04-27 DIAGNOSIS — Z23 NEED FOR VACCINATION WITH 13-POLYVALENT PNEUMOCOCCAL CONJUGATE VACCINE: ICD-10-CM

## 2018-04-27 DIAGNOSIS — R73.9 HYPERGLYCEMIA: Primary | ICD-10-CM

## 2018-04-27 DIAGNOSIS — G89.29 CHRONIC BILATERAL LOW BACK PAIN WITH LEFT-SIDED SCIATICA: ICD-10-CM

## 2018-04-27 DIAGNOSIS — J45.20 MILD INTERMITTENT ASTHMA WITHOUT COMPLICATION: ICD-10-CM

## 2018-04-27 PROCEDURE — 90670 PCV13 VACCINE IM: CPT | Performed by: FAMILY MEDICINE

## 2018-04-27 PROCEDURE — G0009 ADMIN PNEUMOCOCCAL VACCINE: HCPCS | Performed by: FAMILY MEDICINE

## 2018-04-27 PROCEDURE — 99214 OFFICE O/P EST MOD 30 MIN: CPT | Mod: 25 | Performed by: FAMILY MEDICINE

## 2018-04-27 PROCEDURE — 83036 HEMOGLOBIN GLYCOSYLATED A1C: CPT | Mod: 90 | Performed by: FAMILY MEDICINE

## 2018-04-27 PROCEDURE — 36415 COLL VENOUS BLD VENIPUNCTURE: CPT | Performed by: FAMILY MEDICINE

## 2018-04-27 RX ORDER — NITROGLYCERIN 0.4 MG/1
TABLET SUBLINGUAL
Qty: 25 TABLET | Refills: 3 | Status: SHIPPED | OUTPATIENT
Start: 2018-04-27 | End: 2019-04-17

## 2018-04-27 RX ORDER — BECLOMETHASONE DIPROPIONATE HFA 80 UG/1
AEROSOL, METERED RESPIRATORY (INHALATION) SEE ADMIN INSTRUCTIONS
Refills: 0 | COMMUNITY
Start: 2018-04-04 | End: 2019-09-23

## 2018-04-27 RX ORDER — HYDROCODONE BITARTRATE AND ACETAMINOPHEN 5; 325 MG/1; MG/1
1 TABLET ORAL EVERY 6 HOURS PRN
Qty: 20 TABLET | Refills: 0 | Status: SHIPPED | OUTPATIENT
Start: 2018-04-27 | End: 2020-07-13

## 2018-04-27 RX ORDER — HYDROCODONE BITARTRATE AND ACETAMINOPHEN 5; 325 MG/1; MG/1
TABLET ORAL
Refills: 0 | COMMUNITY
Start: 2017-05-07 | End: 2018-04-27

## 2018-04-27 NOTE — MR AVS SNAPSHOT
After Visit Summary   4/27/2018    Peter Hernandez II    MRN: [id number]           Patient Information     Date Of Birth          1950        Visit Information        Provider Department      4/27/2018 8:15 AM Willie Nam MD [facility]        Today's Diagnoses     Hyperglycemia    -  1    Coronary artery disease involving native coronary artery of native heart without angina pectoris        Chronic bilateral low back pain with left-sided sciatica        Mild intermittent asthma without complication        Need for vaccination           Follow-ups after your visit        Your next 10 appointments already scheduled     Sep 10, 2018  7:45 AM CDT   PHYSICAL with Willie Nam MD   [facility] ([facility])    [address] [place] [address] [phone]              Who to contact     If you have questions or need follow up information about today's clinic visit or your schedule please contact [facility] directly at [phone].  Normal or non-critical lab and imaging results will be communicated to you by [employer], letter or phone within 4 business days after the clinic has received the results. If you do not hear from us within 7 days, please contact the clinic through [employer] or phone. If you have a critical or abnormal lab result, we will notify you by phone as soon as possible.  Submit refill requests through [employer] or call your pharmacy and they will forward the refill request to us. Please allow 3 business days for your refill to be completed.          Additional Information About Your Visit        [employer] Information     [employer] gives you secure access to your electronic health record. If you see a primary care provider, you can also send messages to your care team and make appointments. If you have questions, please call your primary care clinic.  If you do not have a primary care provider, please call  "[phone] and they will assist you.        Care EveryWhere ID     This is your Care EveryWhere ID. This could be used by other organizations to access your [place] medical records  [id number]        Your Vitals Were     Pulse Respirations Height Pulse Oximetry BMI (Body Mass Index)       56 12 1.835 m (6' 0.25\") 96% 30.01 kg/m2        Blood Pressure from Last 3 Encounters:   04/27/18 130/66   04/06/18 126/66   08/15/17 126/76    Weight from Last 3 Encounters:   04/27/18 101.1 kg (222 lb 12.8 oz)   04/06/18 101.2 kg (223 lb)   08/15/17 101.2 kg (223 lb)              We Performed the Following     Hemoglobin A1C ([facility])     PNEUMOCOCCAL CONJ VACCINE 13 VALENT IM          Today's Medication Changes          These changes are accurate as of 4/27/18  8:54 AM.  If you have any questions, ask your nurse or doctor.               These medicines have changed or have updated prescriptions.        Dose/Directions    HYDROcodone-acetaminophen 5-325 MG per tablet   Commonly known as:  NORCO   This may have changed:    - how much to take  - how to take this  - when to take this  - reasons to take this   Used for:  Chronic bilateral low back pain with left-sided sciatica   Changed by:  Willie Nam MD        Dose:  1 tablet   Take 1 tablet by mouth every 6 hours as needed for severe pain   Quantity:  20 tablet   Refills:  0       ketoconazole 2 % cream   Commonly known as:  NIZORAL   This may have changed:  additional instructions   Used for:  Yeast infection of the skin        Apply topically daily   Quantity:  60 g   Refills:  3            Where to get your medicines      These medications were sent to [facility]/pharmacy #[id number] [place], [place] - [address] [facility]  [address] [place] [address]     Phone:  [phone]     nitroGLYcerin 0.4 MG sublingual tablet         Some of these will need a paper prescription and others can be bought over the counter.  Ask your nurse if you have questions.     " Bring a paper prescription for each of these medications     HYDROcodone-acetaminophen 5-325 MG per tablet               Information about OPIOIDS     PRESCRIPTION OPIOIDS: WHAT YOU NEED TO KNOW   You have a prescription for an opioid (narcotic) pain medicine. Opioids can cause addiction. If you have a history of chemical dependency of any type, you are at a higher risk of becoming addicted to opioids. Only take this medicine after all other options have been tried. Take it for as short a time and as few doses as possible.     Do not:    Drive. If you drive while taking these medicines, you could be arrested for driving under the influence (DUI).    Operate heavy machinery    Do any other dangerous activities while taking these medicines.     Drink any alcohol while taking these medicines.      Take with any other medicines that contain acetaminophen. Read all labels carefully. Look for the word  acetaminophen  or  Tylenol.  Ask your pharmacist if you have questions or are unsure.    Store your pills in a secure place, locked if possible. We will not replace any lost or stolen medicine. If you don t finish your medicine, please throw away (dispose) as directed by your pharmacist. The [place] Pollution Control Agency has more information about safe disposal: https://www.pca.[facility].[place]/living-green/managing-unwanted-medications    All opioids tend to cause constipation. Drink plenty of water and eat foods that have a lot of fiber, such as fruits, vegetables, prune juice, apple juice and high-fiber cereal. Take a laxative (Miralax, milk of magnesia, Colace, Senna) if you don t move your bowels at least every other day.          Primary Care Provider Office Phone # Fax #    Willie Nam [phone] [address]  [facility] [address]        Equal Access to Services     THELMA PHAN AH: bill Wong qaybta kaalmada adeegyada, waxay idiin hayaan  yosvanybartolo martiolesya laKayleneaan ah. So [facility] [place] [phone].    ATENCIÓN: Si luis brizuela, tiene a jaquez disposición servicios gratuitos de asistencia lingüística. Phil bloom [phone].    We comply with applicable federal civil rights laws and [place] laws. We do not discriminate on the basis of race, color, national origin, age, disability, sex, sexual orientation, or gender identity.            Thank you!     Thank you for choosing [facility]  for your care. Our goal is always to provide you with excellent care. Hearing back from our patients is one way we can continue to improve our services. Please take a few minutes to complete the written survey that you may receive in the mail after your visit with us. Thank you!             Your Updated Medication List - Protect others around you: Learn how to safely use, store and throw away your medicines at www.disposemymeds.org.          This list is accurate as of 4/27/18  8:54 AM.  Always use your most recent med list.                   Brand Name Dispense Instructions for use Diagnosis    amLODIPine 5 MG tablet    NORVASC    90 tablet    Take 1 tablet (5 mg) by mouth daily    Encounter for medication refill       aspirin 81 MG tablet      Take 1 tablet by mouth daily.    Pain in shoulder       CIALIS 5 MG tablet   Generic drug:  tadalafil     30 tablet    Take 1 tablet by mouth as needed for erectile dysfunction. Never use with nitroglycerin, terazosin or doxazosin.    ED (erectile dysfunction)       DAILY MULTIVITAMIN PO      Take  by mouth.        GLUCOSAMINE-CHONDROITIN PO      Take 1 tablet by mouth daily. 1000 mg        hydrochlorothiazide 12.5 MG capsule    MICROZIDE    90 capsule    Take 1 capsule (12.5 mg) by mouth daily    Essential hypertension       HYDROcodone-acetaminophen 5-325 MG per tablet    NORCO    20 tablet    Take 1 tablet by mouth every 6 hours as needed for severe pain    Chronic bilateral low back pain with left-sided sciatica        ketoconazole 2 % cream    NIZORAL    60 g    Apply topically daily    Yeast infection of the skin       lisinopril 10 MG tablet    PRINIVIL/ZESTRIL    90 tablet    TAKE 1 TABLET BY MOUTH ONCE DAILY.    Encounter for medication refill       nitroGLYcerin 0.4 MG sublingual tablet    NITROSTAT    25 tablet    For chest pain place 1 tablet under the tongue every 5 minutes for 3 doses. If symptoms persist 5 minutes after 1st dose call 911.    Coronary artery disease involving native coronary artery of native heart without angina pectoris       QVAR 80 MCG/ACT Inhaler   Generic drug:  beclomethasone           QVAR REDIHALER 80 MCG/ACT Aerb   Generic drug:  Beclomethasone Diprop HFA      See Admin Instructions Only in winter or Around cats        ranitidine 150 MG tablet    ZANTAC     Take 150 mg by mouth as needed.        rosuvastatin 20 MG tablet    CRESTOR    90 tablet    Take 1 tablet (20 mg) by mouth daily    Pure hypercholesterolemia       VENTOLIN  (90 Base) MCG/ACT Inhaler   Generic drug:  albuterol      Inhale 2 puffs into the lungs every 6 hours as needed for shortness of breath / dyspnea or wheezing

## 2018-04-27 NOTE — LETTER
"[facility] [address] [place]  [address]  Phone: [phone]    May 2, 2018      Peter Hernandez   [address] [place] [address]              Dear Peter,    The results from your recent visit showed Hemoglobin A1c is mildly elevated in the \"pre-diabetes\" range. Diabetes can be avoided by keeping weight normal, exercising, and following a low simple carbohydrate diet. This should be rechecked every 6 months.         Sincerely,     Shireen Lugo M.D.    Results for orders placed or performed in visit on 04/27/18   Hemoglobin A1C ([facility])   Result Value Ref Range    Hemoglobin A1C 5.8 (H) 4.8 - 5.6 %    Narrative    Performed at:  01 - [facility] [address], [place], [place]  [id number]  : Braxton Hogan MD, Phone:  [phone]      "

## 2018-04-27 NOTE — PROGRESS NOTES
Problem(s) Oriented visit        SUBJECTIVE:                                                    Peter Hernandez II is a 68 year old male who presents to clinic today for the following health issues :    1. Coronary artery disease involving native coronary artery of native heart without angina pectoris  Prior of coronary artery disease as listed in medical history.  No current or recent cardiovascaulr symptoms.   No shortness of breath, no episodes of chest pain/pressure, no dyspnea on exertion, no changes in his abiliy to perform physical exertion or tasks.  Takes the same amount of time to perform similar physical tasks.        - nitroGLYcerin (NITROSTAT) 0.4 MG sublingual tablet; For chest pain place 1 tablet under the tongue every 5 minutes for 3 doses. If symptoms persist 5 minutes after 1st dose call 911.  Dispense: 25 tablet; Refill: 3    2. Hyperglycemia  131 on a fasting level  - Hemoglobin A1C ([facility])    3. Chronic bilateral low back pain with left-sided sciatica  Very rare and takes less than twice per month  - HYDROcodone-acetaminophen (NORCO) 5-325 MG per tablet; Take 1 tablet by mouth every 6 hours as needed for severe pain  Dispense: 20 tablet; Refill: 0    4. Mild intermittent asthma without complication  Asthma stable.  Has not had any recent breathing troubles beyond usual baseline.  Has not any acute respiratory events.  Remains with intermittent cough, mild shortness of breath with overexertion as per usual.  Using medication as directed with reported side effects    ACT Total Scores 8/22/2016 2/24/2017 4/27/2018   ACT TOTAL SCORE - - -   ASTHMA ER VISITS - - -   ASTHMA HOSPITALIZATIONS - - -   ACT TOTAL SCORE (Goal Greater than or Equal to 20) 22 20 22   In the past 12 months, how many times did you visit the emergency room for your asthma without being admitted to the hospital? 0 0 0   In the past 12 months, how many times were you hospitalized overnight because of your asthma? 0 0 0         "      Problem list, Medication list, Allergies, and Medical/Social/Surgical histories reviewed in EPIC and updated as appropriate.   Additional history: as documented    ROS:  5 point ROS completed and negative except noted above, including Gen, CV, Resp, GI, MS    Histories:   Patient Active Problem List   Diagnosis     Hypercholesteremia     CAD (coronary artery disease)     ACP (advance care planning)     Esophageal reflux     Health Care Home     Family history of ischemic heart disease     Asthma     Benign essential hypertension     Past Surgical History:   Procedure Laterality Date     HEART CATH, ANGIOPLASTY  10/4/02    3.0 X 8 mm Velocity stent     ROTATOR CUFF REPAIR RT/LT  2009    Dr. Butler       Social History   Substance Use Topics     Smoking status: Never Smoker     Smokeless tobacco: Never Used     Alcohol use 0.5 oz/week     1 Standard drinks or equivalent per week      Comment: ocasionally     Family History   Problem Relation Age of Onset     C.A.D. Father      CANCER Father      bone     CEREBROVASCULAR DISEASE Mother            OBJECTIVE:                                                    /66  Pulse 56  Resp 12  Ht 1.835 m (6' 0.25\")  Wt 101.1 kg (222 lb 12.8 oz)  SpO2 96%  BMI 30.01 kg/m2  Body mass index is 30.01 kg/(m^2).   APPEARANCE: = Relaxed and in no distress  Conj/Eyelids = noninjected and lids and lashes are without inflammation  PERRLA/Irises = Pupils Round Reactive to Light and Irisis without inflammation  Ears/Nose = External structures and Nares have usual shape and form  Ear canals and TM's = Canals are not inflammed and have none or little wax and the drums are not injected and have a light reflex   Lips/Teeth/Gums = No lesions seen, good dentition, and gums seem healthy  Oropharynx = No leukoplakia, No injection to the tissues, Normal Uvula  Neck = No anterior or posterior adenopathy appreciated.  Resp effort = Calm regular breathing  Breath Sounds = Good air " movement with no rales or rhonchi in any lung fields  Mood/Affect = Cooperative and interested     ASSESSMENT/PLAN:                                                        Peter was seen today for asthma, patient request and recheck medication.    Diagnoses and all orders for this visit:    Hyperglycemia  -     Hemoglobin A1C ([facility])    Coronary artery disease involving native coronary artery of native heart without angina pectoris  -     nitroGLYcerin (NITROSTAT) 0.4 MG sublingual tablet; For chest pain place 1 tablet under the tongue every 5 minutes for 3 doses. If symptoms persist 5 minutes after 1st dose call 911.    Chronic bilateral low back pain with left-sided sciatica  -     HYDROcodone-acetaminophen (NORCO) 5-325 MG per tablet; Take 1 tablet by mouth every 6 hours as needed for severe pain    Mild intermittent asthma without complication        Work on weight loss    The following health maintenance items are reviewed in Epic and correct as of today:  Health Maintenance   Topic Date Due     PNEUMOCOCCAL (1 of 2 - PCV13) 04/16/2017     AORTIC ANEURYSM SCREENING (SYSTEM ASSIGNED)  04/16/2017     ASTHMA CONTROL TEST Q6 MOS  08/24/2017     ASTHMA ACTION PLAN Q1 YR  12/19/2017     ADVANCE DIRECTIVE PLANNING Q5 YRS  05/17/2018     FALL RISK ASSESSMENT  05/26/2018     LIPID MONITORING Q1 YEAR  04/20/2019     COLON CANCER SCREEN (SYSTEM ASSIGNED)  07/18/2021     TETANUS IMMUNIZATION (SYSTEM ASSIGNED)  10/24/2023     INFLUENZA VACCINE  Completed     HEPATITIS C SCREENING  Completed       Willie Nam MD  [facility]  Family Practice  [facility]  [phone]    For any issues my office # is [phone]

## 2018-04-28 LAB — HBA1C MFR BLD: 5.8 % (ref 4.8–5.6)

## 2018-04-28 ASSESSMENT — ASTHMA QUESTIONNAIRES: ACT_TOTALSCORE: 22

## 2018-05-25 DIAGNOSIS — I10 ESSENTIAL HYPERTENSION: ICD-10-CM

## 2018-05-25 RX ORDER — HYDROCHLOROTHIAZIDE 12.5 MG/1
12.5 CAPSULE ORAL DAILY
Qty: 90 CAPSULE | Refills: 2 | Status: SHIPPED | OUTPATIENT
Start: 2018-05-25 | End: 2019-04-17

## 2018-05-25 RX ORDER — HYDROCHLOROTHIAZIDE 12.5 MG/1
12.5 CAPSULE ORAL DAILY
Qty: 90 CAPSULE | Refills: 3 | Status: SHIPPED | OUTPATIENT
Start: 2018-05-25 | End: 2018-05-25

## 2018-07-16 DIAGNOSIS — B37.2 YEAST INFECTION OF THE SKIN: ICD-10-CM

## 2018-07-16 RX ORDER — KETOCONAZOLE 20 MG/G
CREAM TOPICAL
Qty: 60 G | Refills: 0 | Status: SHIPPED | OUTPATIENT
Start: 2018-07-16 | End: 2019-03-22

## 2018-08-08 DIAGNOSIS — E78.00 PURE HYPERCHOLESTEROLEMIA: ICD-10-CM

## 2018-08-08 RX ORDER — ROSUVASTATIN CALCIUM 20 MG/1
20 TABLET, COATED ORAL DAILY
Qty: 90 TABLET | Refills: 2 | Status: SHIPPED | OUTPATIENT
Start: 2018-08-08 | End: 2019-04-17

## 2018-08-10 ENCOUNTER — OFFICE VISIT (OUTPATIENT)
Dept: FAMILY MEDICINE | Facility: CLINIC | Age: 68
End: 2018-08-10

## 2018-08-10 VITALS
DIASTOLIC BLOOD PRESSURE: 76 MMHG | SYSTOLIC BLOOD PRESSURE: 122 MMHG | WEIGHT: 227.2 LBS | RESPIRATION RATE: 16 BRPM | OXYGEN SATURATION: 97 % | HEART RATE: 79 BPM | BODY MASS INDEX: 30.6 KG/M2

## 2018-08-10 DIAGNOSIS — R30.0 DYSURIA: Primary | ICD-10-CM

## 2018-08-10 LAB
BACTERIA URINE: ABNORMAL
BILIRUB UR QL STRIP: ABNORMAL
BLOOD URINE DIP: ABNORMAL
CASTS/LPF: 0
COLOR UR: YELLOW
CRYSTAL URINE: 0
EPITHELIAL CELLS - QUEST: 0
GLUCOSE UR STRIP-MCNC: ABNORMAL MG/DL
KETONES UR QL STRIP: ABNORMAL
LEUKOCYTE ESTERASE URINE DIP: ABNORMAL
MUCOUS URINE: 0
NITRITE UR QL STRIP: ABNORMAL
PH UR STRIP: 6 PH (ref 5–9)
PROT UR QL: ABNORMAL MG/DL (ref ?–0.01)
RBC URINE: ABNORMAL (ref 0–3)
SP GR UR STRIP: 1.01 (ref 1–1.02)
UROBILINOGEN UR QL STRIP: 0.2 EU/DL (ref 0.2–1)
WBC URINE: ABNORMAL (ref 0–3)

## 2018-08-10 PROCEDURE — 99213 OFFICE O/P EST LOW 20 MIN: CPT | Performed by: FAMILY MEDICINE

## 2018-08-10 PROCEDURE — 81003 URINALYSIS AUTO W/O SCOPE: CPT | Performed by: FAMILY MEDICINE

## 2018-08-10 NOTE — PROGRESS NOTES
"  SUBJECTIVE:   Peter Hernandez II is a 68 year old male who presents to clinic today for the following health issues:    Stream slower in the middle of the night    Genitourinary symptoms      Duration: 1 week    Description:  dysuria, frequency and nocturia x 2    Intensity:  mild    Accompanying signs and symptoms (fever/discharge/nausea/vomiting/back or abdominal pain):  None    History (frequent UTI's/kidney stones/prostate problems): Enlarged Prostate        BM fine        White male with glasses    Estimated body mass index is 30.6 kg/(m^2) as calculated from the following:    Height as of 4/27/18: 1.835 m (6' 0.25\").    Weight as of this encounter: 103.1 kg (227 lb 3.2 oz).  Weight loss is recommended    Sleep fall asleep nap after dinner, then awakens 15 min nocturia x2  6 hours sleep total  Appetite ok  Trying to lose the weight  More fruit  Less sweets  Today had lunch  Planning popcorn and fruit  Exercise walking    Smoking no  ETOH 1-2 per weekend  Street drugs/MJ no  Caffeine decaf    Cardiology flushing in face discussed. Cold press- since off that less flushing.     good    Results for orders placed or performed in visit on 08/10/18   Urinalysis w/reflex protein, bili ([facility])   Result Value Ref Range    Color Urine Yellow     pH Urine 6.0 5 - 9 pH    Specific Gravity Urine 1.015 1.005 - 1.025    Protein Urine Neg 0.01 mg/dL    Glucose Urine neg     Ketones Urine neg     Leukocyte Esterase Urine trace (A)     Blood Urine trace (A)     Nitrite Urine Neg NEG    Bilirubin Urine Dip neg     Urobilinogen Urine 0.2 0.2 - 1.0 EU/dL    WBC Urine Rare 0 - 3    RBC Urine Rare 0 - 3    Epithelial Cells 0     Crystal Urine 0     Bacteria Urine Few     Mucous Urine 0     Casts/LPF 0     ROS: 10 point ROS neg other than the symptoms noted above in the HPI.  Other than above  /76  Pulse 79  Resp 16  Wt 103.1 kg (227 lb 3.2 oz)  SpO2 97%  BMI 30.6 kg/m2  Exam normal  HEENT normal  Nodes " normal  Thyroid normal  Lungs CTA  CV RRR R1R2 without MGR No edema (h/o stent)  Abdomin BS present No HSM  SAINI  Declined prostate exam    Planning physcial exam with Dr Nam        Problem list and histories reviewed & adjusted, as indicated.  Additional history: as documented    Patient Active Problem List   Diagnosis     Hypercholesteremia     CAD (coronary artery disease)     ACP (advance care planning)     Esophageal reflux     Health Care Home     Family history of ischemic heart disease     Asthma     Benign essential hypertension     Past Surgical History:   Procedure Laterality Date     HEART CATH, ANGIOPLASTY  10/4/02    3.0 X 8 mm Velocity stent     ROTATOR CUFF REPAIR RT/LT  2009    Dr. Butler       Social History   Substance Use Topics     Smoking status: Never Smoker     Smokeless tobacco: Never Used     Alcohol use 0.5 oz/week     1 Standard drinks or equivalent per week      Comment: ocasionally     Family History   Problem Relation Age of Onset     C.A.D. Father      Cancer Father      bone     Cerebrovascular Disease Mother          Current Outpatient Prescriptions   Medication Sig Dispense Refill     albuterol (VENTOLIN HFA) 108 (90 BASE) MCG/ACT inhaler Inhale 2 puffs into the lungs every 6 hours as needed for shortness of breath / dyspnea or wheezing       amLODIPine (NORVASC) 5 MG tablet Take 1 tablet (5 mg) by mouth daily 90 tablet 3     aspirin 81 MG tablet Take 1 tablet by mouth daily.  3     GLUCOSAMINE-CHONDROITIN PO Take 1 tablet by mouth daily. 1000 mg         hydrochlorothiazide (MICROZIDE) 12.5 MG capsule Take 1 capsule (12.5 mg) by mouth daily 90 capsule 2     HYDROcodone-acetaminophen (NORCO) 5-325 MG per tablet Take 1 tablet by mouth every 6 hours as needed for severe pain 20 tablet 0     ketoconazole (NIZORAL) 2 % cream APPLY TOPICALLY DAILY 60 g 0     lisinopril (PRINIVIL/ZESTRIL) 10 MG tablet TAKE 1 TABLET BY MOUTH ONCE DAILY. 90 tablet 3     Multiple Vitamin (DAILY  MULTIVITAMIN PO) Take  by mouth.         nitroGLYcerin (NITROSTAT) 0.4 MG sublingual tablet For chest pain place 1 tablet under the tongue every 5 minutes for 3 doses. If symptoms persist 5 minutes after 1st dose call 911. 25 tablet 3     QVAR 80 MCG/ACT Inhaler   2     QVAR REDIHALER 80 MCG/ACT AERB See Admin Instructions Only in winter or  Around cats  0     ranitidine (ZANTAC) 150 MG tablet Take 150 mg by mouth as needed.       rosuvastatin (CRESTOR) 20 MG tablet Take 1 tablet (20 mg) by mouth daily 90 tablet 2     tadalafil (CIALIS) 5 MG tablet Take 1 tablet by mouth as needed for erectile dysfunction. Never use with nitroglycerin, terazosin or doxazosin. 30 tablet 1     Allergies   Allergen Reactions     Cats      Simvastatin      achey     Recent Labs   Lab Test  04/27/18   0900  04/20/18   0726  04/06/18   0838   05/16/17   0826  02/07/17   0912   A1C  5.8*   --    --    --    --    --    LDL   --   66  79   --   64  60   HDL   --   56  57   --   53  65   TRIG   --   95  92   --   81  98   ALT   --   <5*  <5*   --    --   32   CR   --   1.27  1.34*   < >  1.22  1.13   GFRESTIMATED   --   57*  54*   --   60*  65   GFRESTBLACK   --   69  65   --   72  79   POTASSIUM   --   3.9  4.1   < >  4.8  4.5    < > = values in this interval not displayed.      BP Readings from Last 3 Encounters:   08/10/18 122/76   04/27/18 130/66   04/06/18 126/66    Wt Readings from Last 3 Encounters:   08/10/18 103.1 kg (227 lb 3.2 oz)   04/27/18 101.1 kg (222 lb 12.8 oz)   04/06/18 101.2 kg (223 lb)                  Labs reviewed in EPIC    Reviewed and updated as needed this visit by clinical staff  Tobacco  Allergies  Meds       Reviewed and updated as needed this visit by Provider             ASSESSMENT/PLAN:     ASSESSMENT / PLAN:  (R30.0) Dysuria  (primary encounter diagnosis)  Comment:   Plan: Urinalysis w/reflex protein, bili ([facility]), Urine         Culture  Routine ([facility])          Results for orders placed or performed in  visit on 08/10/18   Urinalysis w/reflex protein, bili ([facility])   Result Value Ref Range    Color Urine Yellow     pH Urine 6.0 5 - 9 pH    Specific Gravity Urine 1.015 1.005 - 1.025    Protein Urine Neg 0.01 mg/dL    Glucose Urine neg     Ketones Urine neg     Leukocyte Esterase Urine trace (A)     Blood Urine trace (A)     Nitrite Urine Neg NEG    Bilirubin Urine Dip neg     Urobilinogen Urine 0.2 0.2 - 1.0 EU/dL    WBC Urine Rare 0 - 3    RBC Urine Rare 0 - 3    Epithelial Cells 0     Crystal Urine 0     Bacteria Urine Few     Mucous Urine 0     Casts/LPF 0      Consider BPH also. Discuss with Dr Nam at your physical exam.          Nelly Connolly MD  [facility]

## 2018-08-10 NOTE — MR AVS SNAPSHOT
After Visit Summary   8/10/2018    Peter Hernandez II    MRN: [id number]           Patient Information     Date Of Birth          1950        Visit Information        Provider Department      8/10/2018 2:45 PM Nelly Connolly MD [facility]        Today's Diagnoses     Dysuria    -  1       Follow-ups after your visit        Your next 10 appointments already scheduled     Sep 10, 2018  7:45 AM CDT   PHYSICAL with Willie Nam MD   [facility]    [address] [place] [address] [phone]              Who to contact     If you have questions or need follow up information about today's clinic visit or your schedule please contact [facility] directly at [phone].  Normal or non-critical lab and imaging results will be communicated to you by MyChart, letter or phone within 4 business days after the clinic has received the results. If you do not hear from us within 7 days, please contact the clinic through MyChart or phone. If you have a critical or abnormal lab result, we will notify you by phone as soon as possible.  Submit refill requests through [employer] or call your pharmacy and they will forward the refill request to us. Please allow 3 business days for your refill to be completed.          Additional Information About Your Visit        MyChart Information     [employer] gives you secure access to your electronic health record. If you see a primary care provider, you can also send messages to your care team and make appointments. If you have questions, please call your primary care clinic.  If you do not have a primary care provider, please call [phone] and they will assist you.        Care EveryWhere ID     This is your Care EveryWhere ID. This could be used by other organizations to access your [place] medical records  [id number]        Your Vitals Were     Pulse Respirations Pulse Oximetry  BMI (Body Mass Index)          79 16 97% 30.6 kg/m2         Blood Pressure from Last 3 Encounters:   08/10/18 122/76   04/27/18 130/66   04/06/18 126/66    Weight from Last 3 Encounters:   08/10/18 103.1 kg (227 lb 3.2 oz)   04/27/18 101.1 kg (222 lb 12.8 oz)   04/06/18 101.2 kg (223 lb)              We Performed the Following     Urinalysis w/reflex protein, bili ([facility])     Urine Culture  Routine ([facility])        Primary Care Provider Office Phone # Fax #    Willie Nam [phone] [address]  [facility] [address]        Equal Access to Services     THELMA PHAN : Hadii erin albarran hadasho Soomaali, waaxda luqadaha, qaybta kaalmada adeegyada, nicholas larry . So [facility] [phone].    ATENCIÓN: Si habla español, tiene a jaquez disposición servicios gratuitos de asistencia lingüística. Llame al [phone].    We comply with applicable federal civil rights laws and [place] laws. We do not discriminate on the basis of race, color, national origin, age, disability, sex, sexual orientation, or gender identity.            Thank you!     Thank you for choosing [facility]  for your care. Our goal is always to provide you with excellent care. Hearing back from our patients is one way we can continue to improve our services. Please take a few minutes to complete the written survey that you may receive in the mail after your visit with us. Thank you!             Your Updated Medication List - Protect others around you: Learn how to safely use, store and throw away your medicines at www.disposemymeds.org.          This list is accurate as of 8/10/18 11:59 PM.  Always use your most recent med list.                   Brand Name Dispense Instructions for use Diagnosis    amLODIPine 5 MG tablet    NORVASC    90 tablet    Take 1 tablet (5 mg) by mouth daily    Encounter for medication refill       aspirin 81 MG tablet      Take 1 tablet by mouth daily.    Pain  in shoulder       CIALIS 5 MG tablet   Generic drug:  tadalafil     30 tablet    Take 1 tablet by mouth as needed for erectile dysfunction. Never use with nitroglycerin, terazosin or doxazosin.    ED (erectile dysfunction)       DAILY MULTIVITAMIN PO      Take  by mouth.        GLUCOSAMINE-CHONDROITIN PO      Take 1 tablet by mouth daily. 1000 mg        hydrochlorothiazide 12.5 MG capsule    MICROZIDE    90 capsule    Take 1 capsule (12.5 mg) by mouth daily    Essential hypertension       HYDROcodone-acetaminophen 5-325 MG per tablet    NORCO    20 tablet    Take 1 tablet by mouth every 6 hours as needed for severe pain    Chronic bilateral low back pain with left-sided sciatica       ketoconazole 2 % cream    NIZORAL    60 g    APPLY TOPICALLY DAILY    Yeast infection of the skin       lisinopril 10 MG tablet    PRINIVIL/ZESTRIL    90 tablet    TAKE 1 TABLET BY MOUTH ONCE DAILY.    Encounter for medication refill       nitroGLYcerin 0.4 MG sublingual tablet    NITROSTAT    25 tablet    For chest pain place 1 tablet under the tongue every 5 minutes for 3 doses. If symptoms persist 5 minutes after 1st dose call 911.    Coronary artery disease involving native coronary artery of native heart without angina pectoris       QVAR 80 MCG/ACT Inhaler   Generic drug:  beclomethasone           QVAR REDIHALER 80 MCG/ACT inhaler   Generic drug:  beclomethasone HFA      See Admin Instructions Only in winter or Around cats        ranitidine 150 MG tablet    ZANTAC     Take 150 mg by mouth as needed.        rosuvastatin 20 MG tablet    CRESTOR    90 tablet    Take 1 tablet (20 mg) by mouth daily    Pure hypercholesterolemia       VENTOLIN  (90 Base) MCG/ACT inhaler   Generic drug:  albuterol      Inhale 2 puffs into the lungs every 6 hours as needed for shortness of breath / dyspnea or wheezing

## 2018-08-13 ENCOUNTER — TELEPHONE (OUTPATIENT)
Dept: FAMILY MEDICINE | Facility: CLINIC | Age: 68
End: 2018-08-13

## 2018-08-13 DIAGNOSIS — N39.0 URINARY TRACT INFECTION: Primary | ICD-10-CM

## 2018-08-13 LAB
ANTIMICROBIAL SUSCEPTIBILITY: ABNORMAL
Lab: ABNORMAL
URINE CULTURE: ABNORMAL

## 2018-08-13 RX ORDER — CIPROFLOXACIN 500 MG/1
500 TABLET, FILM COATED ORAL 2 TIMES DAILY
Qty: 6 TABLET | Refills: 0 | Status: SHIPPED | OUTPATIENT
Start: 2018-08-13 | End: 2018-09-10

## 2018-08-13 NOTE — TELEPHONE ENCOUNTER
Called patient with result of urine culture.  Informed him of positive culture and that he should start Macrobid. Sent rx to pharmacy and came back that it is not covered under his formulary.  Per Dr Cathleen HURTADO to change rx to Cipro 500 mg BID for 3 days,  Let patient know of change. He will brennon urine culture after he finishes the prescription.

## 2018-08-21 DIAGNOSIS — Z76.0 ENCOUNTER FOR MEDICATION REFILL: ICD-10-CM

## 2018-08-21 RX ORDER — AMLODIPINE BESYLATE 5 MG/1
5 TABLET ORAL DAILY
Qty: 90 TABLET | Refills: 1 | Status: SHIPPED | OUTPATIENT
Start: 2018-08-21 | End: 2019-02-12

## 2018-08-21 RX ORDER — LISINOPRIL 10 MG/1
TABLET ORAL
Qty: 90 TABLET | Refills: 1 | Status: SHIPPED | OUTPATIENT
Start: 2018-08-21 | End: 2019-02-12

## 2018-08-22 DIAGNOSIS — N39.0 URINARY TRACT INFECTION: ICD-10-CM

## 2018-08-24 LAB
Lab: NO GROWTH
URINE CULTURE: NORMAL

## 2018-09-10 ENCOUNTER — OFFICE VISIT (OUTPATIENT)
Dept: FAMILY MEDICINE | Facility: CLINIC | Age: 68
End: 2018-09-10

## 2018-09-10 VITALS
DIASTOLIC BLOOD PRESSURE: 78 MMHG | HEIGHT: 73 IN | SYSTOLIC BLOOD PRESSURE: 138 MMHG | WEIGHT: 215.8 LBS | BODY MASS INDEX: 28.6 KG/M2 | RESPIRATION RATE: 12 BRPM | HEART RATE: 80 BPM

## 2018-09-10 DIAGNOSIS — E78.00 HYPERCHOLESTEREMIA: ICD-10-CM

## 2018-09-10 DIAGNOSIS — R94.120 FAILED HEARING SCREENING: ICD-10-CM

## 2018-09-10 DIAGNOSIS — I10 BENIGN ESSENTIAL HYPERTENSION: ICD-10-CM

## 2018-09-10 DIAGNOSIS — Z12.5 SCREENING FOR PROSTATE CANCER: ICD-10-CM

## 2018-09-10 DIAGNOSIS — Z23 NEED FOR 23-POLYVALENT PNEUMOCOCCAL POLYSACCHARIDE VACCINE: ICD-10-CM

## 2018-09-10 DIAGNOSIS — J45.20 MILD INTERMITTENT ASTHMA WITHOUT COMPLICATION: ICD-10-CM

## 2018-09-10 DIAGNOSIS — I25.10 CORONARY ARTERY DISEASE INVOLVING NATIVE CORONARY ARTERY OF NATIVE HEART WITHOUT ANGINA PECTORIS: ICD-10-CM

## 2018-09-10 DIAGNOSIS — L98.9 FACIAL LESION: ICD-10-CM

## 2018-09-10 DIAGNOSIS — Z71.89 ADVANCED DIRECTIVES, COUNSELING/DISCUSSION: ICD-10-CM

## 2018-09-10 DIAGNOSIS — Z00.00 MEDICARE ANNUAL WELLNESS VISIT, SUBSEQUENT: Primary | ICD-10-CM

## 2018-09-10 DIAGNOSIS — Z23 NEED FOR VACCINATION: ICD-10-CM

## 2018-09-10 DIAGNOSIS — Z23 NEED FOR SHINGLES VACCINE: ICD-10-CM

## 2018-09-10 LAB
% GRANULOCYTES: 68 % (ref 42.2–75.2)
HCT VFR BLD AUTO: 44.2 % (ref 39–51)
HEMOGLOBIN: 14.5 G/DL (ref 13.4–17.5)
LYMPHOCYTES NFR BLD AUTO: 24.7 % (ref 20.5–51.1)
MCH RBC QN AUTO: 29.3 PG (ref 27–31)
MCHC RBC AUTO-ENTMCNC: 32.8 G/DL (ref 33–37)
MCV RBC AUTO: 89.3 FL (ref 80–100)
MONOCYTES NFR BLD AUTO: 7.3 % (ref 1.7–9.3)
PLATELET # BLD AUTO: 156 K/UL (ref 140–450)
RBC # BLD AUTO: 4.95 X10/CMM (ref 4.2–5.9)
WBC # BLD AUTO: 7.7 X10/CMM (ref 3.8–11)

## 2018-09-10 PROCEDURE — 99214 OFFICE O/P EST MOD 30 MIN: CPT | Mod: 25 | Performed by: FAMILY MEDICINE

## 2018-09-10 PROCEDURE — G0009 ADMIN PNEUMOCOCCAL VACCINE: HCPCS | Performed by: FAMILY MEDICINE

## 2018-09-10 PROCEDURE — 92552 PURE TONE AUDIOMETRY AIR: CPT | Performed by: FAMILY MEDICINE

## 2018-09-10 PROCEDURE — 85025 COMPLETE CBC W/AUTO DIFF WBC: CPT | Performed by: FAMILY MEDICINE

## 2018-09-10 PROCEDURE — 90471 IMMUNIZATION ADMIN: CPT | Mod: GY | Performed by: FAMILY MEDICINE

## 2018-09-10 PROCEDURE — G0103 PSA SCREENING: HCPCS | Mod: 90 | Performed by: FAMILY MEDICINE

## 2018-09-10 PROCEDURE — 90732 PPSV23 VACC 2 YRS+ SUBQ/IM: CPT | Performed by: FAMILY MEDICINE

## 2018-09-10 PROCEDURE — 90750 HZV VACC RECOMBINANT IM: CPT | Mod: GY | Performed by: FAMILY MEDICINE

## 2018-09-10 PROCEDURE — 92551 PURE TONE HEARING TEST AIR: CPT | Performed by: FAMILY MEDICINE

## 2018-09-10 PROCEDURE — 99397 PER PM REEVAL EST PAT 65+ YR: CPT | Mod: 25 | Performed by: FAMILY MEDICINE

## 2018-09-10 PROCEDURE — 36415 COLL VENOUS BLD VENIPUNCTURE: CPT | Performed by: FAMILY MEDICINE

## 2018-09-10 NOTE — PROGRESS NOTES
SUBJECTIVE:   Peter Hernandez II is a 68 year old male who presents for Preventive Visit.      Are you in the first 12 months of your Medicare Part B coverage?  No    Healthy Habits:    Do you get at least three servings of calcium containing foods daily (dairy, green leafy vegetables, etc.)? yes    Amount of exercise or daily activities, outside of work: 5 day(s) per week    Problems taking medications regularly No    Medication side effects: No    Have you had an eye exam in the past two years? yes    Do you see a dentist twice per year? 4x /yr    Do you have sleep apnea, excessive snoring or daytime drowsiness?no      Ability to successfully perform activities of daily living: Yes, no assistance needed    Home safety:  throw rugs in the hallway and lack of grab bars in the bathroom   Hearing impairment: Yes, 2  HEARING FREQUENCY TESTED BOTH EARS:Failed  Right Ear/Left Ear      500 Hz: passed/failed: pass    1000 Hz: Passed   2000 Hz: Passed   4000 Hz: failed left, pass right  Check for ear wax or referral  Linda Collins MA 9/10/2018         Fall risk:  Fallen 2 or more times in the past year?: No  Any fall with injury in the past year?: No        COGNITIVE SCREEN  1) Repeat 3 items (Leader, Season, Table)    2) Clock draw: ABNORMAL time off  3) 3 item recall: Recalls 3 objects  Results: 3 items recalled: COGNITIVE IMPAIRMENT LESS LIKELY    Mini-CogTM Copyright S Dion. Licensed by the author for use in [facility]; reprinted with permission (shannon@[facility]). All rights reserved.      Has history of hyperlipidemia.  On statin for this, denies any significant side effects of this medication.      Latest labs reviewed:    Recent Labs   Lab Test  04/20/18   0726  04/06/18   0838   07/01/15   0752   CHOL  141  154   < >  132   HDL  56  57   < >  49   LDL  66  79   < >  62*   TRIG  95  92   < >  105   CHOLHDLRATIO   --    --    --   2.7    < > = values in this interval not displayed.        Lab Results    Component Value Date    AST 24 12/19/2016      Blood presure remains well controlled when checked out of clinic.    Reviewed last 6 BP readings in chart:  BP Readings from Last 6 Encounters:   09/10/18 138/78   08/10/18 122/76   04/27/18 130/66   04/06/18 126/66   08/15/17 126/76   05/26/17 124/70       he has not experienced any significant side effects from medications for hypertension.    NO active cardiac complaints or symptoms with exercise.    Asthma stable.  Has not had any recent breathing troubles beyond usual baseline.  Has not any acute respiratory events.  Remains with intermittent cough, mild shortness of breath with overexertion as per usual.  Using medication as directed with reported side effects    ACT Total Scores 8/22/2016 2/24/2017 4/27/2018   ACT TOTAL SCORE - - -   ASTHMA ER VISITS - - -   ASTHMA HOSPITALIZATIONS - - -   ACT TOTAL SCORE (Goal Greater than or Equal to 20) 22 20 22   In the past 12 months, how many times did you visit the emergency room for your asthma without being admitted to the hospital? 0 0 0   In the past 12 months, how many times were you hospitalized overnight because of your asthma? 0 0 0     Prior of coronary artery disease as listed in medical history.  No current or recent cardiovascaulr symptoms.   No shortness of breath, no episodes of chest pain/pressure, no dyspnea on exertion, no changes in his abiliy to perform physical exertion or tasks.  Takes the same amount of time to perform similar physical tasks.    Saw Cardiology in the past year.    Facial lesion in the setting of previous basal cell     Fasting today    Reviewed and updated as needed this visit by clinical staff         Reviewed and updated as needed this visit by Provider        Social History   Substance Use Topics     Smoking status: Never Smoker     Smokeless tobacco: Never Used     Alcohol use 0.5 oz/week     1 Standard drinks or equivalent per week      Comment: ocasionally       If you drink  alcohol do you typically have >3 drinks per day or >7 drinks per week? No                        Today's PHQ-2 Score:   PHQ-2 ( 1999 Pfizer) 7/10/2015   Q1: Little interest or pleasure in doing things 0   Q2: Feeling down, depressed or hopeless 0   PHQ-2 Score 0       Do you feel safe in your environment - Yes    Do you have a Health Care Directive?: Yes: Patient states has Advance Directive and will bring in a copy to clinic.    Current providers sharing in care for this patient include:   Patient Care Team:  Willie Nam MD as PCP - General (Family Practice)  Braulio Blanca MD as MD (Cardiology)    The following health maintenance items are reviewed in Epic and correct as of today:  Health Maintenance   Topic Date Due     PHQ-2 Q1 YR  04/16/1962     AORTIC ANEURYSM SCREENING (SYSTEM ASSIGNED)  04/16/2017     ADVANCE DIRECTIVE PLANNING Q5 YRS  05/17/2018     INFLUENZA VACCINE (1) 09/01/2018     ASTHMA CONTROL TEST Q6 MOS  10/27/2018     LIPID MONITORING Q1 YEAR  04/20/2019     ASTHMA ACTION PLAN Q1 YR  04/27/2019     FALL RISK ASSESSMENT  04/27/2019     PNEUMOCOCCAL (2 of 2 - PPSV23) 04/27/2019     COLON CANCER SCREEN (SYSTEM ASSIGNED)  07/18/2021     TETANUS IMMUNIZATION (SYSTEM ASSIGNED)  10/24/2023     HEPATITIS C SCREENING  Completed     Patient Active Problem List   Diagnosis     Hypercholesteremia     CAD (coronary artery disease)     ACP (advance care planning)     Esophageal reflux     Health Care Home     Family history of ischemic heart disease     Asthma     Benign essential hypertension     Past Surgical History:   Procedure Laterality Date     HEART CATH, ANGIOPLASTY  10/4/02    3.0 X 8 mm Velocity stent     ROTATOR CUFF REPAIR RT/LT  2009    Dr. Butler       Social History   Substance Use Topics     Smoking status: Never Smoker     Smokeless tobacco: Never Used     Alcohol use 0.5 oz/week     1 Standard drinks or equivalent per week      Comment: ocasionally     Family History  "  Problem Relation Age of Onset     C.A.D. Father      Cancer Father      bone     Cerebrovascular Disease Mother            Pneumonia Vaccine:Adults age 65+ who received Pneumovax (PPSV23) at 65 years or older: Should be given PCV13 > 1 year after their most recent PPSV23    ROS:  Constitutional, HEENT, cardiovascular, pulmonary, GI, , musculoskeletal, neuro, skin, endocrine and psych systems are negative, except as otherwise noted.    OBJECTIVE:   There were no vitals taken for this visit. Estimated body mass index is 30.6 kg/(m^2) as calculated from the following:    Height as of 4/27/18: 1.835 m (6' 0.25\").    Weight as of 8/10/18: 103.1 kg (227 lb 3.2 oz).  EXAM:   GENERAL: healthy, alert and no distress  EYES: Eyes grossly normal to inspection, PERRL and conjunctivae and sclerae normal  HENT: ear canals and TM's normal, nose and mouth without ulcers or lesions  NECK: no adenopathy, no asymmetry, masses, or scars and thyroid normal to palpation  RESP: lungs clear to auscultation - no rales, rhonchi or wheezes  CV: regular rate and rhythm, normal S1 S2, no S3 or S4, no murmur, click or rub, no peripheral edema and peripheral pulses strong  ABDOMEN: soft, nontender, no hepatosplenomegaly, no masses and bowel sounds normal   (male): normal male genitalia without lesions or urethral discharge, no hernia  RECTAL: normal sphincter tone, no rectal masses, prostate normal size, smooth, nontender without nodules or masses  MS: no gross musculoskeletal defects noted, no edema  SKIN: no suspicious lesions or rashes  NEURO: Normal strength and tone, mentation intact and speech normal  PSYCH: mentation appears normal, affect normal/bright        ASSESSMENT / PLAN:   Peter was seen today for physical and hearing screening.    Diagnoses and all orders for this visit:    Medicare annual wellness visit, subsequent    Facial lesion  -     Referral to [facility]    Mild intermittent asthma " without complication  Discussed asthma in detail and discussed how their breathing symptoms and the pattern of the shortness of breath fits with asthma.   Discussed pathophysiology of asthma and treatments based on the degree of symptoms.   Discussed triggers for asthma in general, and those specific to the patient.  Also told them to anticipate potentially more intense coughing and shortness of breath with any URI (regardless of bacterial or viral etiology) and to be prepared to use the albuterol more often if needed and to return and see me for any such exacerbation.    I recommended having rescue inhaler available and using all throughout the year as needed, demonstrated proper MDI technique for them.  Emphasized the need for them to let me know if there are any changes for the worse in their breathing related to asthma, either acute or chronic and to seek emergency care if the breathing acutely worsens in a sever manner.      Hypercholesteremia  Discussed current lipid results, previous results (if available) current guidelines (NCEP) for treatment and goals for lipids.  Discussed lifestyle modification, dietary changes (low fat, low simple carb) and regular aerobic exercise.  Discussed the link between dysmetabolic syndrome and impaired glucose tolerance seen in certain patterns of lipids.  Briefly discussed medication used for lipid lowering, including the statins are their possible side effects of myalgias, rhabdomyolysis, and liver toxicity.    Benign essential hypertension  -     CBC with Diff/Plt ([facility])  Discussed current hypertension treatment guidelines, including indications for treatment and treatment options.  Discussed the importance for aggressive management of HTN to prevent vascular complications later.  Recommended lower fat, lower carbohydrate, and lower sodium (<2000 mg)diet.  Discussed required intervals for follow up on HTN, lab studies.  Recommened pt. follow their blood pressures outside the  "clinic to ensure that BPs are remaining within guidelines, and to contact me if the readings are not within guidelines on a regular basis so we can adjust treatment as needed.  Coronary artery disease involving native coronary artery of native heart without angina pectoris  The patient does not report any signs or symptoms of angina or active cardiac ischemia.   They do not report any relative changes in their ability to perform physical activities over the past year.    We discussed aggressive secondary risk factor modification, including aggressive BP control (under 130/ideally), aggressive LDL lowering with statin (goal under 100 for sure/under 70 ideally), no smoking, diabetes prevention/management, no smoking, and use of either ASA or similar anti platelet agent if tolerated.        Need for vaccination  -     ZOSTER VACCINE RECOMBINANT ADJUVANTED IM NJX  -     PPSV23, IM/SUBQ (2+ YRS) - [id number]    Screening for prostate cancer  -     PSA Serum ([facility])        End of Life Planning:  Patient currently has an advanced directive: Yes.  Practitioner is supportive of decision.    COUNSELING:  Reviewed preventive health counseling, as reflected in patient instructions       Regular exercise       Healthy diet/nutrition    BP Readings from Last 1 Encounters:   08/10/18 122/76     Estimated body mass index is 30.6 kg/(m^2) as calculated from the following:    Height as of 4/27/18: 1.835 m (6' 0.25\").    Weight as of 8/10/18: 103.1 kg (227 lb 3.2 oz).      Weight management plan: Discussed healthy diet and exercise guidelines and patient will follow up in 3 months in clinic to re-evaluate.     reports that he has never smoked. He has never used smokeless tobacco.      Appropriate preventive services were discussed with this patient, including applicable screening as appropriate for cardiovascular disease, diabetes, osteopenia/osteoporosis, and glaucoma.  As appropriate for age/gender, discussed screening for " colorectal cancer, prostate cancer, breast cancer, and cervical cancer. Checklist reviewing preventive services available has been given to the patient.    Reviewed patients plan of care and provided an AVS. The Basic Care Plan (routine screening as documented in Health Maintenance) for Peter meets the Care Plan requirement. This Care Plan has been established and reviewed with the Patient.    Counseling Resources:  ATP IV Guidelines  Pooled Cohorts Equation Calculator  Breast Cancer Risk Calculator  FRAX Risk Assessment  ICSI Preventive Guidelines  Dietary Guidelines for Americans, 2010  USDA's MyPlate  ASA Prophylaxis  Lung CA Screening    Willie Nam MD  [facility]

## 2018-09-10 NOTE — MR AVS SNAPSHOT
After Visit Summary   9/10/2018    Peter Hernandez II    MRN: [id number]           Patient Information     Date Of Birth          1950        Visit Information        Provider Department      9/10/2018 7:45 AM Willie Nam MD [facility]        Today's Diagnoses     Medicare annual wellness visit, subsequent    -  1    Facial lesion        Mild intermittent asthma without complication        Hypercholesteremia        Benign essential hypertension        Coronary artery disease involving native coronary artery of native heart without angina pectoris        Need for vaccination        Screening for prostate cancer          Care Instructions      Preventive Health Recommendations:       Male Ages 65 and over    Yearly exam:             See your health care provider every year in order to  o   Review health changes.   o   Discuss preventive care.    o   Review your medicines if your doctor has prescribed any.    Talk with your health care provider about whether you should have a test to screen for prostate cancer (PSA).    Every 3 years, have a diabetes test (fasting glucose). If you are at risk for diabetes, you should have this test more often.    Every 5 years, have a cholesterol test. Have this test more often if you are at risk for high cholesterol or heart disease.     Every 10 years, have a colonoscopy. Or, have a yearly FIT test (stool test). These exams will check for colon cancer.    Talk to with your health care provider about screening for Abdominal Aortic Aneurysm if you have a family history of AAA or have a history of smoking.  Shots:     Get a flu shot each year.     Get a tetanus shot every 10 years.     Talk to your doctor about your pneumonia vaccines. There are now two you should receive - Pneumovax (PPSV 23) and Prevnar (PCV 13).    Talk to your pharmacist about a shingles vaccine.     Talk to your doctor about the hepatitis B vaccine.  Nutrition:     Eat at  least 5 servings of fruits and vegetables each day.     Eat whole-grain bread, whole-wheat pasta and brown rice instead of white grains and rice.     Get adequate Calcium and Vitamin D.   Lifestyle    Exercise for at least 150 minutes a week (30 minutes a day, 5 days a week). This will help you control your weight and prevent disease.     Limit alcohol to one drink per day.     No smoking.     Wear sunscreen to prevent skin cancer.     See your dentist every six months for an exam and cleaning.     See your eye doctor every 1 to 2 years to screen for conditions such as glaucoma, macular degeneration and cataracts.          Follow-ups after your visit        Additional Services     Referral to [facility]       Referral to [facility]  Phone:  [phone]  Reason for Referral:  History of basal cell ca on face with new facial lesion     Please be aware that coverage of these services is subject to the terms and limitations of your health insurance plan.  Call member services at your health plan with any benefit or coverage questions.                  Who to contact     If you have questions or need follow up information about today's clinic visit or your schedule please contact [facility] directly at [phone].  Normal or non-critical lab and imaging results will be communicated to you by Zencoder[employer], letter or phone within 4 business days after the clinic has received the results. If you do not hear from us within 7 days, please contact the clinic through Taggablet or phone. If you have a critical or abnormal lab result, we will notify you by phone as soon as possible.  Submit refill requests through [employer] or call your pharmacy and they will forward the refill request to us. Please allow 3 business days for your refill to be completed.          Additional Information About Your Visit        [employer] Information     [employer] gives you secure access to  "your electronic health record. If you see a primary care provider, you can also send messages to your care team and make appointments. If you have questions, please call your primary care clinic.  If you do not have a primary care provider, please call [phone] and they will assist you.        Care EveryWhere ID     This is your Care EveryWhere ID. This could be used by other organizations to access your [place] medical records  [id number]        Your Vitals Were     Pulse Respirations Height BMI (Body Mass Index)          80 12 1.854 m (6' 1\") 28.47 kg/m2         Blood Pressure from Last 3 Encounters:   09/10/18 138/78   08/10/18 122/76   04/27/18 130/66    Weight from Last 3 Encounters:   09/10/18 97.9 kg (215 lb 12.8 oz)   08/10/18 103.1 kg (227 lb 3.2 oz)   04/27/18 101.1 kg (222 lb 12.8 oz)              We Performed the Following     CBC with Diff/Plt ([facility])     PPSV23, IM/SUBQ (2+ YRS) - [id number]     PSA Serum ([facility])     Referral to [facility]     ZOSTER VACCINE RECOMBINANT ADJUVANTED IM NJX          Today's Medication Changes          These changes are accurate as of 9/10/18  8:39 AM.  If you have any questions, ask your nurse or doctor.               Stop taking these medicines if you haven't already. Please contact your care team if you have questions.     ciprofloxacin 500 MG tablet   Commonly known as:  CIPRO   Stopped by:  Willie Nam MD           QVAR 80 MCG/ACT Inhaler   Generic drug:  beclomethasone   Stopped by:  Willie Nam MD                    Primary Care Provider Office Phone # Fax #    Willie Nam [phone] [address]  [facility] [address]        Equal Access to Services     Sharp Mes[facility] VistaSYLWIA : Yulissa Schuler, wajaimie babb, qaybta kaalpaco barbosa, nicholas guthrie. [facility] [phone].    ATENCIÓN: Si habla español, tiene a jaquez disposición servicios " gabby de asistencia lingüística. Phil bloom [phone].    We comply with applicable federal civil rights laws and [place] laws. We do not discriminate on the basis of race, color, national origin, age, disability, sex, sexual orientation, or gender identity.            Thank you!     Thank you for choosing [facility]  for your care. Our goal is always to provide you with excellent care. Hearing back from our patients is one way we can continue to improve our services. Please take a few minutes to complete the written survey that you may receive in the mail after your visit with us. Thank you!             Your Updated Medication List - Protect others around you: Learn how to safely use, store and throw away your medicines at www.disposemymeds.org.          This list is accurate as of 9/10/18  8:39 AM.  Always use your most recent med list.                   Brand Name Dispense Instructions for use Diagnosis    amLODIPine 5 MG tablet    NORVASC    90 tablet    Take 1 tablet (5 mg) by mouth daily    Encounter for medication refill       aspirin 81 MG tablet      Take 1 tablet by mouth daily.    Pain in shoulder       CIALIS 5 MG tablet   Generic drug:  tadalafil     30 tablet    Take 1 tablet by mouth as needed for erectile dysfunction. Never use with nitroglycerin, terazosin or doxazosin.    ED (erectile dysfunction)       DAILY MULTIVITAMIN PO      Take  by mouth.        GLUCOSAMINE-CHONDROITIN PO      Take 1 tablet by mouth daily. 1000 mg        hydrochlorothiazide 12.5 MG capsule    MICROZIDE    90 capsule    Take 1 capsule (12.5 mg) by mouth daily    Essential hypertension       HYDROcodone-acetaminophen 5-325 MG per tablet    NORCO    20 tablet    Take 1 tablet by mouth every 6 hours as needed for severe pain    Chronic bilateral low back pain with left-sided sciatica       ketoconazole 2 % cream    NIZORAL    60 g    APPLY TOPICALLY DAILY    Yeast infection of the skin       lisinopril 10 MG  tablet    PRINIVIL/ZESTRIL    90 tablet    TAKE 1 TABLET BY MOUTH ONCE DAILY.    Encounter for medication refill       nitroGLYcerin 0.4 MG sublingual tablet    NITROSTAT    25 tablet    For chest pain place 1 tablet under the tongue every 5 minutes for 3 doses. If symptoms persist 5 minutes after 1st dose call 911.    Coronary artery disease involving native coronary artery of native heart without angina pectoris       QVAR REDIHALER 80 MCG/ACT inhaler   Generic drug:  beclomethasone HFA      See Admin Instructions Only in winter or Around cats        ranitidine 150 MG tablet    ZANTAC     Take 150 mg by mouth as needed.        rosuvastatin 20 MG tablet    CRESTOR    90 tablet    Take 1 tablet (20 mg) by mouth daily    Pure hypercholesterolemia       VENTOLIN  (90 Base) MCG/ACT inhaler   Generic drug:  albuterol      Inhale 2 puffs into the lungs every 6 hours as needed for shortness of breath / dyspnea or wheezing

## 2018-09-11 LAB — PSA NG/ML: 1.4 NG/ML (ref 0–4)

## 2018-09-12 NOTE — PROGRESS NOTES
Dear Peter,   I am writing to report that your included test results are within expected ranges. I do not suggest that we make any changes at this time.    Willie Nam M.D.

## 2019-02-12 DIAGNOSIS — Z76.0 ENCOUNTER FOR MEDICATION REFILL: ICD-10-CM

## 2019-02-12 RX ORDER — AMLODIPINE BESYLATE 5 MG/1
5 TABLET ORAL DAILY
Qty: 90 TABLET | Refills: 0 | Status: SHIPPED | OUTPATIENT
Start: 2019-02-12 | End: 2019-04-17

## 2019-02-12 RX ORDER — LISINOPRIL 10 MG/1
TABLET ORAL
Qty: 90 TABLET | Refills: 0 | Status: SHIPPED | OUTPATIENT
Start: 2019-02-12 | End: 2019-04-17

## 2019-03-22 DIAGNOSIS — B37.2 YEAST INFECTION OF THE SKIN: ICD-10-CM

## 2019-03-25 RX ORDER — KETOCONAZOLE 20 MG/G
CREAM TOPICAL
Qty: 60 G | Refills: 0 | Status: SHIPPED | OUTPATIENT
Start: 2019-03-25 | End: 2020-06-19

## 2019-04-12 DIAGNOSIS — I10 ESSENTIAL HYPERTENSION: ICD-10-CM

## 2019-04-12 DIAGNOSIS — N28.9 RENAL INSUFFICIENCY: Primary | ICD-10-CM

## 2019-04-17 ENCOUNTER — OFFICE VISIT (OUTPATIENT)
Dept: CARDIOLOGY | Facility: CLINIC | Age: 69
End: 2019-04-17
Payer: COMMERCIAL

## 2019-04-17 VITALS
SYSTOLIC BLOOD PRESSURE: 124 MMHG | HEART RATE: 62 BPM | HEIGHT: 73 IN | BODY MASS INDEX: 29.42 KG/M2 | DIASTOLIC BLOOD PRESSURE: 76 MMHG | WEIGHT: 222 LBS

## 2019-04-17 DIAGNOSIS — I10 ESSENTIAL HYPERTENSION: ICD-10-CM

## 2019-04-17 DIAGNOSIS — I25.10 CORONARY ARTERY DISEASE INVOLVING NATIVE CORONARY ARTERY OF NATIVE HEART WITHOUT ANGINA PECTORIS: ICD-10-CM

## 2019-04-17 DIAGNOSIS — N28.9 RENAL INSUFFICIENCY: ICD-10-CM

## 2019-04-17 DIAGNOSIS — I10 BENIGN ESSENTIAL HYPERTENSION: Primary | ICD-10-CM

## 2019-04-17 DIAGNOSIS — E78.00 HYPERCHOLESTEREMIA: ICD-10-CM

## 2019-04-17 DIAGNOSIS — E78.00 PURE HYPERCHOLESTEROLEMIA: ICD-10-CM

## 2019-04-17 DIAGNOSIS — Z76.0 ENCOUNTER FOR MEDICATION REFILL: ICD-10-CM

## 2019-04-17 LAB
ALT SERPL W P-5'-P-CCNC: <5 U/L (ref 5–30)
ANION GAP SERPL CALCULATED.3IONS-SCNC: 11.2 MMOL/L (ref 6–17)
BUN SERPL-MCNC: 21 MG/DL (ref 7–30)
CALCIUM SERPL-MCNC: 9 MG/DL (ref 8.5–10.5)
CHLORIDE SERPL-SCNC: 106 MMOL/L (ref 98–107)
CHOLEST SERPL-MCNC: 144 MG/DL
CO2 SERPL-SCNC: 29 MMOL/L (ref 23–29)
CREAT SERPL-MCNC: 1.22 MG/DL (ref 0.7–1.3)
GFR SERPL CREATININE-BSD FRML MDRD: 59 ML/MIN/{1.73_M2}
GLUCOSE SERPL-MCNC: 113 MG/DL (ref 70–105)
HDLC SERPL-MCNC: 53 MG/DL
LDLC SERPL CALC-MCNC: 67 MG/DL
NONHDLC SERPL-MCNC: 91 MG/DL
POTASSIUM SERPL-SCNC: 4.2 MMOL/L (ref 3.5–5.1)
SODIUM SERPL-SCNC: 142 MMOL/L (ref 136–145)
TRIGL SERPL-MCNC: 120 MG/DL

## 2019-04-17 PROCEDURE — 84460 ALANINE AMINO (ALT) (SGPT): CPT | Performed by: NURSE PRACTITIONER

## 2019-04-17 PROCEDURE — 80061 LIPID PANEL: CPT | Performed by: NURSE PRACTITIONER

## 2019-04-17 PROCEDURE — 80048 BASIC METABOLIC PNL TOTAL CA: CPT | Performed by: NURSE PRACTITIONER

## 2019-04-17 PROCEDURE — 36415 COLL VENOUS BLD VENIPUNCTURE: CPT | Performed by: NURSE PRACTITIONER

## 2019-04-17 PROCEDURE — 99214 OFFICE O/P EST MOD 30 MIN: CPT | Performed by: NURSE PRACTITIONER

## 2019-04-17 RX ORDER — ROSUVASTATIN CALCIUM 20 MG/1
20 TABLET, COATED ORAL DAILY
Qty: 90 TABLET | Refills: 3 | Status: SHIPPED | OUTPATIENT
Start: 2019-04-17 | End: 2020-05-28

## 2019-04-17 RX ORDER — NITROGLYCERIN 0.4 MG/1
TABLET SUBLINGUAL
Qty: 25 TABLET | Refills: 3 | Status: SHIPPED | OUTPATIENT
Start: 2019-04-17 | End: 2021-02-17

## 2019-04-17 RX ORDER — HYDROCHLOROTHIAZIDE 12.5 MG/1
12.5 CAPSULE ORAL DAILY
Qty: 90 CAPSULE | Refills: 3 | Status: SHIPPED | OUTPATIENT
Start: 2019-04-17 | End: 2020-05-18

## 2019-04-17 RX ORDER — AMLODIPINE BESYLATE 5 MG/1
5 TABLET ORAL DAILY
Qty: 90 TABLET | Refills: 3 | Status: SHIPPED | OUTPATIENT
Start: 2019-04-17 | End: 2020-06-11

## 2019-04-17 RX ORDER — LISINOPRIL 10 MG/1
TABLET ORAL
Qty: 90 TABLET | Refills: 3 | Status: SHIPPED | OUTPATIENT
Start: 2019-04-17 | End: 2020-07-09

## 2019-04-17 ASSESSMENT — MIFFLIN-ST. JEOR: SCORE: 1835.87

## 2019-04-17 NOTE — LETTER
4/17/2019    Willie Nam MD  [address]  [facility] [address]    RE: Peter Hernandez II       Dear Colleague,    I had the pleasure of seeing Peter Hernandez II in the [facility].    History of Present Illness:    Peter Hernandez II is a 69 year old male followed here by Dr. Blanca. He returns today for is annual visit.     Peter has a history of coronary artery disease, hypertension and dyslipidemia. He reported facial flushing to us in the past and carcinoid syndrome was ruled out.    In 2012 he underwent stenting of the RCA with 20% LAD and 40% circumflex. Stress echocardiogram at the end of 2015 showed no ischemia. LV function is preserved. His angina is left shoulder pain with activity.    In the past his BP was elevated and we placed him on hydrocholorthiazide and BP improved. Last visit he had a mild elevation of his creatinine to 1.3 from 0.99-1.22; this was rechecked after he was hydrated and found to be back to 1.27.    He has no new medical history other than fighting with sciatica pain on the right side. He is seeing chiropractic for this. Takes Aleve regularly. Given his mild creatinine elevation, I suggested that he limit this and try Tylenol. If that does not help, see PMD for other options.    BMP: sodium 142 potassium 4.2 creatinine 1.22 GFR 59  Lipids: Total Cholesterol: 144 HDL 53 LDL 67     Exam is unremarkable and outlined below    Impression/Plan:     1. CAD s/p RCA stent 2012  Other disease as noted  No left shoulder pain which is his anginal equivalent  Stable, no changes    2. Dyslipidemia-controlled  -continue Crestor 20mg daily    3. HTN-controlled  -continue same meds    4. facial flushing in past, resolved  -negative work up for carcinoid        It has been a pleasure seeing Peter Hernandez II in follow up. He would like to see Dr. Blanca in 6 months which I have ordered.     Clemente Nichols, MSN,  APRN-BC, CNP  Cardiology    Orders Placed This Encounter   Procedures     Follow-Up with Cardiologist     Orders Placed This Encounter   Medications     lisinopril (PRINIVIL/ZESTRIL) 10 MG tablet     Sig: TAKE 1 TABLET BY MOUTH ONCE DAILY.     Dispense:  90 tablet     Refill:  3     amLODIPine (NORVASC) 5 MG tablet     Sig: Take 1 tablet (5 mg) by mouth daily     Dispense:  90 tablet     Refill:  3     rosuvastatin (CRESTOR) 20 MG tablet     Sig: Take 1 tablet (20 mg) by mouth daily     Dispense:  90 tablet     Refill:  3     nitroGLYcerin (NITROSTAT) 0.4 MG sublingual tablet     Sig: For chest pain place 1 tablet under the tongue every 5 minutes for 3 doses. If symptoms persist 5 minutes after 1st dose call 911.     Dispense:  25 tablet     Refill:  3     hydrochlorothiazide (MICROZIDE) 12.5 MG capsule     Sig: Take 1 capsule (12.5 mg) by mouth daily     Dispense:  90 capsule     Refill:  3     Medications Discontinued During This Encounter   Medication Reason     GLUCOSAMINE-CHONDROITIN PO Stopped by Patient     lisinopril (PRINIVIL/ZESTRIL) 10 MG tablet Reorder     amLODIPine (NORVASC) 5 MG tablet Reorder     rosuvastatin (CRESTOR) 20 MG tablet Reorder     nitroGLYcerin (NITROSTAT) 0.4 MG sublingual tablet Reorder     hydrochlorothiazide (MICROZIDE) 12.5 MG capsule Reorder         Encounter Diagnoses   Name Primary?     Benign essential hypertension Yes     Hypercholesteremia      Encounter for medication refill      Pure hypercholesterolemia      Coronary artery disease involving native coronary artery of native heart without angina pectoris      Essential hypertension        CURRENT MEDICATIONS:  Current Outpatient Medications   Medication Sig Dispense Refill     albuterol (VENTOLIN HFA) 108 (90 BASE) MCG/ACT inhaler Inhale 2 puffs into the lungs every 6 hours as needed for shortness of breath / dyspnea or wheezing       amLODIPine (NORVASC) 5 MG tablet Take 1 tablet (5 mg) by mouth daily 90 tablet 3      aspirin 81 MG tablet Take 1 tablet by mouth daily.  3     hydrochlorothiazide (MICROZIDE) 12.5 MG capsule Take 1 capsule (12.5 mg) by mouth daily 90 capsule 3     HYDROcodone-acetaminophen (NORCO) 5-325 MG per tablet Take 1 tablet by mouth every 6 hours as needed for severe pain 20 tablet 0     lisinopril (PRINIVIL/ZESTRIL) 10 MG tablet TAKE 1 TABLET BY MOUTH ONCE DAILY. 90 tablet 3     Multiple Vitamin (DAILY MULTIVITAMIN PO) Take  by mouth.         nitroGLYcerin (NITROSTAT) 0.4 MG sublingual tablet For chest pain place 1 tablet under the tongue every 5 minutes for 3 doses. If symptoms persist 5 minutes after 1st dose call 911. 25 tablet 3     ranitidine (ZANTAC) 150 MG tablet Take 150 mg by mouth as needed.       rosuvastatin (CRESTOR) 20 MG tablet Take 1 tablet (20 mg) by mouth daily 90 tablet 3     tadalafil (CIALIS) 5 MG tablet Take 1 tablet by mouth as needed for erectile dysfunction. Never use with nitroglycerin, terazosin or doxazosin. 30 tablet 1     ketoconazole (NIZORAL) 2 % external cream APPLY TOPICALLY DAILY 60 g 0     QVAR REDIHALER 80 MCG/ACT AERB See Admin Instructions Only in winter or  Around cats  0       ALLERGIES     Allergies   Allergen Reactions     Cats      Simvastatin      achey       PAST MEDICAL HISTORY:  Past Medical History:   Diagnosis Date     ACP (advance care planning)     will bring copy in      Bruit      CAD (coronary artery disease) 2002    a stent     Esophageal reflux 5/17/2013     Family history of ischemic heart disease      Hyperlipidaemia      Hypertension        PAST SURGICAL HISTORY:  Past Surgical History:   Procedure Laterality Date     HEART CATH, ANGIOPLASTY  10/4/02    3.0 X 8 mm Velocity stent     ROTATOR CUFF REPAIR RT/LT  2009    Dr. Butler       FAMILY HISTORY:  Family History   Problem Relation Age of Onset     C.A.D. Father      Cancer Father         bone     Cerebrovascular Disease Mother        SOCIAL HISTORY:  Social History     Socioeconomic History      Marital status:      Spouse name: None     Number of children: None     Years of education: None     Highest education level: None   Occupational History     Occupation: Finance   Social Needs     Financial resource strain: None     Food insecurity:     Worry: None     Inability: None     Transportation needs:     Medical: None     Non-medical: None   Tobacco Use     Smoking status: Never Smoker     Smokeless tobacco: Never Used   Substance and Sexual Activity     Alcohol use: Yes     Alcohol/week: 0.5 oz     Types: 1 Standard drinks or equivalent per week     Comment: ocasionally     Drug use: No     Sexual activity: Yes     Partners: Female   Lifestyle     Physical activity:     Days per week: None     Minutes per session: None     Stress: None   Relationships     Social connections:     Talks on phone: None     Gets together: None     Attends Ep[employer] service: None     Active member of club or organization: None     Attends meetings of clubs or organizations: None     Relationship status: None     Intimate partner violence:     Fear of current or ex partner: None     Emotionally abused: None     Physically abused: None     Forced sexual activity: None   Other Topics Concern     Parent/sibling w/ CABG, MI or angioplasty before 65F 55M? No      Service Not Asked     Blood Transfusions Not Asked     Caffeine Concern Not Asked     Occupational Exposure Not Asked     Hobby Hazards Not Asked     Sleep Concern Yes     Comment: occ     Stress Concern No     Weight Concern Not Asked     Special Diet Not Asked     Back Care Not Asked     Exercise Yes     Comment: walks daily     Bike Helmet Not Asked     Seat Belt Not Asked     Self-Exams Not Asked   Social History Narrative     None       Review of Systems:  Skin:  Negative       Eyes:  Positive for glasses    ENT:  Negative      Respiratory:  Positive for dyspnea on exertion asthma    Cardiovascular:  Negative      Gastroenterology: Negative     "  Genitourinary:  not assessed      Musculoskeletal:  Positive for back pain    Neurologic:  Negative      Psychiatric:  Negative      Heme/Lymph/Imm:  Positive for allergies    Endocrine:  Negative        Physical Exam:  Vitals: /76   Pulse 62   Ht 1.854 m (6' 1\")   Wt 100.7 kg (222 lb)   BMI 29.29 kg/m       Constitutional:  cooperative, alert and oriented, well developed, well nourished, in no acute distress overweight      Skin:  warm and dry to the touch, no apparent skin lesions or masses noted          Head:  normocephalic, no masses or lesions        Eyes:  pupils equal and round, conjunctivae and lids unremarkable, sclera white, no xanthalasma, EOMS intact, no nystagmus        Lymph:No Cervical lymphadenopathy present     ENT:  no pallor or cyanosis, dentition good        Neck:  carotid pulses are full and equal bilaterally, JVP normal, no carotid bruit        Respiratory:  normal breath sounds, clear to auscultation, normal A-P diameter, normal symmetry, normal respiratory excursion, no use of accessory muscles         Cardiac: regular rhythm;normal S1 and S2;apical impulse not displaced;no murmurs, gallops or rubs detected   S4            pulses full and equal, no bruits auscultated                                        GI:  abdomen soft, non-tender, BS normoactive, no mass, no HSM, no bruits;abdomen soft        Extremities and Muscular Skeletal:  no deformities, clubbing, cyanosis, erythema observed;no edema              Neurological:  no gross motor deficits;affect appropriate        Psych:  Alert and Oriented x 3      Recent Lab Results:  LIPID RESULTS:  Lab Results   Component Value Date    CHOL 144 04/17/2019    HDL 53 04/17/2019    LDL 67 04/17/2019    TRIG 120 04/17/2019    CHOLHDLRATIO 2.7 07/01/2015       LIVER ENZYME RESULTS:  Lab Results   Component Value Date    AST 24 12/19/2016    ALT <5 (L) 04/17/2019       CBC RESULTS:  Lab Results   Component Value Date    WBC 7.7 09/10/2018    " WBC 10.5 06/16/2012    RBC 4.95 09/10/2018    RBC 4.69 06/16/2012    HGB 14.5 09/10/2018    HCT 44.2 09/10/2018    MCV 89.3 09/10/2018    MCH 29.3 09/10/2018    MCHC 32.8 (A) 09/10/2018    RDW 13.4 06/16/2012     09/10/2018       BMP RESULTS:  Lab Results   Component Value Date     04/17/2019    POTASSIUM 4.2 04/17/2019    CHLORIDE 106 04/17/2019    CO2 29 04/17/2019    ANIONGAP 11.2 04/17/2019     (H) 04/17/2019    BUN 21 04/17/2019    CR 1.22 04/17/2019    GFRESTIMATED 59 (L) 04/17/2019    GFRESTBLACK 72 04/17/2019    MARLENE 9.0 04/17/2019        A1C RESULTS:  Lab Results   Component Value Date    A1C 5.8 (H) 04/27/2018       INR RESULTS:  No results found for: INR          Thank you for allowing me to participate in the care of your patient.    Sincerely,     JEANNE Mathis CNP     [facility]

## 2019-08-21 ENCOUNTER — OFFICE VISIT (OUTPATIENT)
Dept: CARDIOLOGY | Facility: CLINIC | Age: 69
End: 2019-08-21
Payer: COMMERCIAL

## 2019-08-21 VITALS
HEART RATE: 60 BPM | HEIGHT: 73 IN | SYSTOLIC BLOOD PRESSURE: 132 MMHG | DIASTOLIC BLOOD PRESSURE: 76 MMHG | WEIGHT: 214 LBS | BODY MASS INDEX: 28.36 KG/M2

## 2019-08-21 DIAGNOSIS — E78.00 HYPERCHOLESTEREMIA: ICD-10-CM

## 2019-08-21 DIAGNOSIS — I10 BENIGN ESSENTIAL HYPERTENSION: Primary | ICD-10-CM

## 2019-08-21 DIAGNOSIS — N28.9 RENAL INSUFFICIENCY: ICD-10-CM

## 2019-08-21 DIAGNOSIS — I25.10 CORONARY ARTERY DISEASE INVOLVING NATIVE CORONARY ARTERY OF NATIVE HEART WITHOUT ANGINA PECTORIS: ICD-10-CM

## 2019-08-21 PROCEDURE — 99214 OFFICE O/P EST MOD 30 MIN: CPT | Performed by: INTERNAL MEDICINE

## 2019-08-21 ASSESSMENT — MIFFLIN-ST. JEOR: SCORE: 1799.58

## 2019-08-21 NOTE — LETTER
2019      Willie Nam MD  [address] [place] [address]      RE: Peter Hernandez BELLA       Dear Colleague,    I had the pleasure of seeing Peter Hernandez II in the [facility].    Service Date: 2019      Willie Nam MD   [facility]    [address] [place] [address]      RE: Peter Hernandez    MRN: [id number]   : 1950      Dear Willie:       It is my pleasure to see your patient Peter Hernandez.  He is a very pleasant, 69-year-old gentleman with a past history of coronary artery disease.  If you remember, he had stenting of the right coronary artery in .  At that time it was noted that he had 40% circumflex disease and 20% LAD disease.  Last stress testing was in , which showed no evidence of ischemia.  He does have a history of essential hypertension.      With that background in mind, he is feeling well.  He has no chest pains or chest pressure, no shortness of breath.  His only major issue is noncardiac, which is sciatica.  He is getting relief from acupuncture.  Today his blood pressure is good at 132/76.  His pulse is 60 beats per minute.  Last lipids were drawn in April showing an LDL of 67, HDL of 53 and triglycerides of 120.  Therefore, he is well within secondary prevention guidelines.  His ALT is less than 5.  He does take rosuvastatin 20 mg per day.  If you remember, in the past he had some renal dysfunction, and this may, in part, have been due to intake of nonsteroidal anti-inflammatory drugs for sciatica.  His kidney function has improved.  In 2018, his GFR was 54.  In 2019, his GFR was 59.  His creatinine dropped from 1.34 down to 1.22.      IMPRESSION:   1.  Coronary artery disease and status post stenting of the right coronary artery.  The patient is asymptomatic with respect to coronary artery disease with no symptoms of angina pectoris.   2.  Essential hypertension.  His  blood pressure is well controlled.   3.  Mild renal insufficiency, which has improved from previously and possibly due to the use of nonsteroidal anti-inflammatory drugs for sciatica.   4.  Excellent lipid profile, well within secondary prevention guidelines.  He is on high-intensity statin therapy, being on rosuvastatin 20 mg per day.      PLAN:  We will continue the patient on his present medications.  I will see the patient back again in 1 year's time.  At that stage, we will repeat his lipids and basic metabolic profile.  Finally, I have asked him to try and lose weight, which he is already doing, and to adhere to a Mediterranean-style diet rich in fish and poultry, fresh vegetables and fresh fruits.      Many thanks for allowing me to be involved in the care of this very nice patient.  I look forward to seeing him again.      Sincerely,      Braulio Aparicio MD, FACC         BRAULIO APARICIO MD, FACC             D: 2019   T: 2019   MT: carrillo      Name:     NATALY CONTRERAS   MRN:      [id number]        Account:      [id number]   :      1950           Service Date: 2019      Document: [id number]         Outpatient Encounter Medications as of 2019   Medication Sig Dispense Refill     albuterol (VENTOLIN HFA) 108 (90 BASE) MCG/ACT inhaler Inhale 2 puffs into the lungs every 6 hours as needed for shortness of breath / dyspnea or wheezing       amLODIPine (NORVASC) 5 MG tablet Take 1 tablet (5 mg) by mouth daily 90 tablet 3     aspirin 81 MG tablet Take 1 tablet by mouth daily.  3     hydrochlorothiazide (MICROZIDE) 12.5 MG capsule Take 1 capsule (12.5 mg) by mouth daily 90 capsule 3     HYDROcodone-acetaminophen (NORCO) 5-325 MG per tablet Take 1 tablet by mouth every 6 hours as needed for severe pain 20 tablet 0     ketoconazole (NIZORAL) 2 % external cream APPLY TOPICALLY DAILY 60 g 0     lisinopril (PRINIVIL/ZESTRIL) 10 MG tablet TAKE 1 TABLET BY MOUTH ONCE DAILY. 90 tablet 3      Multiple Vitamin (DAILY MULTIVITAMIN PO) Take  by mouth.         nitroGLYcerin (NITROSTAT) 0.4 MG sublingual tablet For chest pain place 1 tablet under the tongue every 5 minutes for 3 doses. If symptoms persist 5 minutes after 1st dose call 911. 25 tablet 3     QVAR REDIHALER 80 MCG/ACT AERB See Admin Instructions Only in winter or  Around cats  0     ranitidine (ZANTAC) 150 MG tablet Take 150 mg by mouth as needed.       rosuvastatin (CRESTOR) 20 MG tablet Take 1 tablet (20 mg) by mouth daily 90 tablet 3     tadalafil (CIALIS) 5 MG tablet Take 1 tablet by mouth as needed for erectile dysfunction. Never use with nitroglycerin, terazosin or doxazosin. 30 tablet 1     No facility-administered encounter medications on file as of 8/21/2019.        Again, thank you for allowing me to participate in the care of your patient.      Sincerely,    Braulio Blanca MD, MD     [facility]

## 2019-08-21 NOTE — LETTER
8/21/2019    Willie Nam MD  [address]  [facility] [address]    RE: Peter Hernandez II       Dear Colleague,    I had the pleasure of seeing Peter Hernandez II in the [facility].    HPI and Plan:   See dictation    Orders Placed This Encounter   Procedures     Basic metabolic panel     Lipid Profile     ALT     Follow-Up with Cardiologist       No orders of the defined types were placed in this encounter.      There are no discontinued medications.      Encounter Diagnoses   Name Primary?     Benign essential hypertension Yes     Hypercholesteremia      Coronary artery disease involving native coronary artery of native heart without angina pectoris      Renal insufficiency        CURRENT MEDICATIONS:  Current Outpatient Medications   Medication Sig Dispense Refill     albuterol (VENTOLIN HFA) 108 (90 BASE) MCG/ACT inhaler Inhale 2 puffs into the lungs every 6 hours as needed for shortness of breath / dyspnea or wheezing       amLODIPine (NORVASC) 5 MG tablet Take 1 tablet (5 mg) by mouth daily 90 tablet 3     aspirin 81 MG tablet Take 1 tablet by mouth daily.  3     hydrochlorothiazide (MICROZIDE) 12.5 MG capsule Take 1 capsule (12.5 mg) by mouth daily 90 capsule 3     HYDROcodone-acetaminophen (NORCO) 5-325 MG per tablet Take 1 tablet by mouth every 6 hours as needed for severe pain 20 tablet 0     ketoconazole (NIZORAL) 2 % external cream APPLY TOPICALLY DAILY 60 g 0     lisinopril (PRINIVIL/ZESTRIL) 10 MG tablet TAKE 1 TABLET BY MOUTH ONCE DAILY. 90 tablet 3     Multiple Vitamin (DAILY MULTIVITAMIN PO) Take  by mouth.         nitroGLYcerin (NITROSTAT) 0.4 MG sublingual tablet For chest pain place 1 tablet under the tongue every 5 minutes for 3 doses. If symptoms persist 5 minutes after 1st dose call 911. 25 tablet 3     QVAR REDIHALER 80 MCG/ACT AERB See Admin Instructions Only in winter or  Around cats  0     ranitidine (ZANTAC) 150 MG tablet Take 150 mg by  mouth as needed.       rosuvastatin (CRESTOR) 20 MG tablet Take 1 tablet (20 mg) by mouth daily 90 tablet 3     tadalafil (CIALIS) 5 MG tablet Take 1 tablet by mouth as needed for erectile dysfunction. Never use with nitroglycerin, terazosin or doxazosin. 30 tablet 1       ALLERGIES     Allergies   Allergen Reactions     Cats      Simvastatin      achey       PAST MEDICAL HISTORY:  Past Medical History:   Diagnosis Date     ACP (advance care planning)     will bring copy in      Bruit      CAD (coronary artery disease) 2002    a stent     Esophageal reflux 5/17/2013     Family history of ischemic heart disease      Hyperlipidaemia      Hypertension        PAST SURGICAL HISTORY:  Past Surgical History:   Procedure Laterality Date     HEART CATH, ANGIOPLASTY  10/4/02    3.0 X 8 mm Velocity stent     ROTATOR CUFF REPAIR RT/LT  2009    Dr. Butler       FAMILY HISTORY:  Family History   Problem Relation Age of Onset     C.A.D. Father      Cancer Father         bone     Cerebrovascular Disease Mother        SOCIAL HISTORY:  Social History     Socioeconomic History     Marital status:      Spouse name: None     Number of children: None     Years of education: None     Highest education level: None   Occupational History     Occupation: Finance   Social Needs     Financial resource strain: None     Food insecurity:     Worry: None     Inability: None     Transportation needs:     Medical: None     Non-medical: None   Tobacco Use     Smoking status: Never Smoker     Smokeless tobacco: Never Used   Substance and Sexual Activity     Alcohol use: Yes     Alcohol/week: 0.5 oz     Types: 1 Standard drinks or equivalent per week     Comment: ocasionally     Drug use: No     Sexual activity: Yes     Partners: Female   Lifestyle     Physical activity:     Days per week: None     Minutes per session: None     Stress: None   Relationships     Social connections:     Talks on phone: None     Gets together: None     Attends  "Adventist service: None     Active member of club or organization: None     Attends meetings of clubs or organizations: None     Relationship status: None     Intimate partner violence:     Fear of current or ex partner: None     Emotionally abused: None     Physically abused: None     Forced sexual activity: None   Other Topics Concern     Parent/sibling w/ CABG, MI or angioplasty before 65F 55M? No      Service Not Asked     Blood Transfusions Not Asked     Caffeine Concern Not Asked     Occupational Exposure Not Asked     Hobby Hazards Not Asked     Sleep Concern Yes     Comment: occ     Stress Concern No     Weight Concern Not Asked     Special Diet Not Asked     Back Care Not Asked     Exercise Yes     Comment: walks daily     Bike Helmet Not Asked     Seat Belt Not Asked     Self-Exams Not Asked   Social History Narrative     None       Review of Systems:  Skin:  Negative       Eyes:  Positive for glasses    ENT:  Positive for nasal congestion    Respiratory:  Positive for dyspnea on exertion asthma    Cardiovascular:  Negative      Gastroenterology: Negative      Genitourinary:  not assessed      Musculoskeletal:  Positive for back pain;neck pain    Neurologic:  Negative      Psychiatric:  Negative      Heme/Lymph/Imm:  Positive for allergies    Endocrine:  Negative        Physical Exam:  Vitals: /76   Pulse 60   Ht 1.854 m (6' 1\")   Wt 97.1 kg (214 lb)   BMI 28.23 kg/m       Constitutional:  cooperative, alert and oriented, well developed, well nourished, in no acute distress overweight      Skin:  warm and dry to the touch, no apparent skin lesions or masses noted          Head:  normocephalic, no masses or lesions        Eyes:  pupils equal and round, conjunctivae and lids unremarkable, sclera white, no xanthalasma, EOMS intact, no nystagmus        Lymph:No Cervical lymphadenopathy present     ENT:  no pallor or cyanosis, dentition good        Neck:  carotid pulses are full and equal " bilaterally, JVP normal, no carotid bruit        Respiratory:  normal breath sounds, clear to auscultation, normal A-P diameter, normal symmetry, normal respiratory excursion, no use of accessory muscles         Cardiac: regular rhythm;normal S1 and S2;apical impulse not displaced;no murmurs, gallops or rubs detected   S4            pulses full and equal, no bruits auscultated                                        GI:  abdomen soft, non-tender, BS normoactive, no mass, no HSM, no bruits;abdomen soft        Extremities and Muscular Skeletal:  no deformities, clubbing, cyanosis, erythema observed;no edema              Neurological:  no gross motor deficits;affect appropriate        Psych:  Alert and Oriented x 3        CC  No referring provider defined for this encounter.                Thank you for allowing me to participate in the care of your patient.      Sincerely,     Braulio Blanca MD, MD     [facility]    cc:   No referring provider defined for this encounter.

## 2019-08-21 NOTE — PROGRESS NOTES
Service Date: 2019      Willie Nam MD   [facility]    [address] [place] [address]      RE: Peter Hernandez    MRN: [id number]   : 1950      Dear Willie:       It is my pleasure to see your patient Peter Hernandez.  He is a very pleasant, 69-year-old gentleman with a past history of coronary artery disease.  If you remember, he had stenting of the right coronary artery in .  At that time it was noted that he had 40% circumflex disease and 20% LAD disease.  Last stress testing was in , which showed no evidence of ischemia.  He does have a history of essential hypertension.      With that background in mind, he is feeling well.  He has no chest pains or chest pressure, no shortness of breath.  His only major issue is noncardiac, which is sciatica.  He is getting relief from acupuncture.  Today his blood pressure is good at 132/76.  His pulse is 60 beats per minute.  Last lipids were drawn in April showing an LDL of 67, HDL of 53 and triglycerides of 120.  Therefore, he is well within secondary prevention guidelines.  His ALT is less than 5.  He does take rosuvastatin 20 mg per day.  If you remember, in the past he had some renal dysfunction, and this may, in part, have been due to intake of nonsteroidal anti-inflammatory drugs for sciatica.  His kidney function has improved.  In 2018, his GFR was 54.  In 2019, his GFR was 59.  His creatinine dropped from 1.34 down to 1.22.      IMPRESSION:   1.  Coronary artery disease and status post stenting of the right coronary artery.  The patient is asymptomatic with respect to coronary artery disease with no symptoms of angina pectoris.   2.  Essential hypertension.  His blood pressure is well controlled.   3.  Mild renal insufficiency, which has improved from previously and possibly due to the use of nonsteroidal anti-inflammatory drugs for sciatica.   4.  Excellent lipid profile, well within secondary  prevention guidelines.  He is on high-intensity statin therapy, being on rosuvastatin 20 mg per day.      PLAN:  We will continue the patient on his present medications.  I will see the patient back again in 1 year's time.  At that stage, we will repeat his lipids and basic metabolic profile.  Finally, I have asked him to try and lose weight, which he is already doing, and to adhere to a Mediterranean-style diet rich in fish and poultry, fresh vegetables and fresh fruits.      Many thanks for allowing me to be involved in the care of this very nice patient.  I look forward to seeing him again.      Sincerely,      Braulio Aparicio MD, FACC         BRAULIO APARICIO MD, FACC             D: 2019   T: 2019   MT: carrillo      Name:     NATALY CONTRERAS   MRN:      [id number]        Account:      [id number]   :      1950           Service Date: 2019      Document: [id number]

## 2019-08-21 NOTE — PROGRESS NOTES
HPI and Plan:   See dictation    Orders Placed This Encounter   Procedures     Basic metabolic panel     Lipid Profile     ALT     Follow-Up with Cardiologist       No orders of the defined types were placed in this encounter.      There are no discontinued medications.      Encounter Diagnoses   Name Primary?     Benign essential hypertension Yes     Hypercholesteremia      Coronary artery disease involving native coronary artery of native heart without angina pectoris      Renal insufficiency        CURRENT MEDICATIONS:  Current Outpatient Medications   Medication Sig Dispense Refill     albuterol (VENTOLIN HFA) 108 (90 BASE) MCG/ACT inhaler Inhale 2 puffs into the lungs every 6 hours as needed for shortness of breath / dyspnea or wheezing       amLODIPine (NORVASC) 5 MG tablet Take 1 tablet (5 mg) by mouth daily 90 tablet 3     aspirin 81 MG tablet Take 1 tablet by mouth daily.  3     hydrochlorothiazide (MICROZIDE) 12.5 MG capsule Take 1 capsule (12.5 mg) by mouth daily 90 capsule 3     HYDROcodone-acetaminophen (NORCO) 5-325 MG per tablet Take 1 tablet by mouth every 6 hours as needed for severe pain 20 tablet 0     ketoconazole (NIZORAL) 2 % external cream APPLY TOPICALLY DAILY 60 g 0     lisinopril (PRINIVIL/ZESTRIL) 10 MG tablet TAKE 1 TABLET BY MOUTH ONCE DAILY. 90 tablet 3     Multiple Vitamin (DAILY MULTIVITAMIN PO) Take  by mouth.         nitroGLYcerin (NITROSTAT) 0.4 MG sublingual tablet For chest pain place 1 tablet under the tongue every 5 minutes for 3 doses. If symptoms persist 5 minutes after 1st dose call 911. 25 tablet 3     QVAR REDIHALER 80 MCG/ACT AERB See Admin Instructions Only in winter or  Around cats  0     ranitidine (ZANTAC) 150 MG tablet Take 150 mg by mouth as needed.       rosuvastatin (CRESTOR) 20 MG tablet Take 1 tablet (20 mg) by mouth daily 90 tablet 3     tadalafil (CIALIS) 5 MG tablet Take 1 tablet by mouth as needed for erectile dysfunction. Never use with nitroglycerin,  terazosin or doxazosin. 30 tablet 1       ALLERGIES     Allergies   Allergen Reactions     Cats      Simvastatin      achey       PAST MEDICAL HISTORY:  Past Medical History:   Diagnosis Date     ACP (advance care planning)     will bring copy in      Bruit      CAD (coronary artery disease) 2002    a stent     Esophageal reflux 5/17/2013     Family history of ischemic heart disease      Hyperlipidaemia      Hypertension        PAST SURGICAL HISTORY:  Past Surgical History:   Procedure Laterality Date     HEART CATH, ANGIOPLASTY  10/4/02    3.0 X 8 mm Velocity stent     ROTATOR CUFF REPAIR RT/LT  2009    Dr. Butler       FAMILY HISTORY:  Family History   Problem Relation Age of Onset     C.A.D. Father      Cancer Father         bone     Cerebrovascular Disease Mother        SOCIAL HISTORY:  Social History     Socioeconomic History     Marital status:      Spouse name: None     Number of children: None     Years of education: None     Highest education level: None   Occupational History     Occupation: Finance   Social Needs     Financial resource strain: None     Food insecurity:     Worry: None     Inability: None     Transportation needs:     Medical: None     Non-medical: None   Tobacco Use     Smoking status: Never Smoker     Smokeless tobacco: Never Used   Substance and Sexual Activity     Alcohol use: Yes     Alcohol/week: 0.5 oz     Types: 1 Standard drinks or equivalent per week     Comment: ocasionally     Drug use: No     Sexual activity: Yes     Partners: Female   Lifestyle     Physical activity:     Days per week: None     Minutes per session: None     Stress: None   Relationships     Social connections:     Talks on phone: None     Gets together: None     Attends Faith service: None     Active member of club or organization: None     Attends meetings of clubs or organizations: None     Relationship status: None     Intimate partner violence:     Fear of current or ex partner: None      "Emotionally abused: None     Physically abused: None     Forced sexual activity: None   Other Topics Concern     Parent/sibling w/ CABG, MI or angioplasty before 65F 55M? No      Service Not Asked     Blood Transfusions Not Asked     Caffeine Concern Not Asked     Occupational Exposure Not Asked     Hobby Hazards Not Asked     Sleep Concern Yes     Comment: occ     Stress Concern No     Weight Concern Not Asked     Special Diet Not Asked     Back Care Not Asked     Exercise Yes     Comment: walks daily     Bike Helmet Not Asked     Seat Belt Not Asked     Self-Exams Not Asked   Social History Narrative     None       Review of Systems:  Skin:  Negative       Eyes:  Positive for glasses    ENT:  Positive for nasal congestion    Respiratory:  Positive for dyspnea on exertion asthma    Cardiovascular:  Negative      Gastroenterology: Negative      Genitourinary:  not assessed      Musculoskeletal:  Positive for back pain;neck pain    Neurologic:  Negative      Psychiatric:  Negative      Heme/Lymph/Imm:  Positive for allergies    Endocrine:  Negative        Physical Exam:  Vitals: /76   Pulse 60   Ht 1.854 m (6' 1\")   Wt 97.1 kg (214 lb)   BMI 28.23 kg/m      Constitutional:  cooperative, alert and oriented, well developed, well nourished, in no acute distress overweight      Skin:  warm and dry to the touch, no apparent skin lesions or masses noted          Head:  normocephalic, no masses or lesions        Eyes:  pupils equal and round, conjunctivae and lids unremarkable, sclera white, no xanthalasma, EOMS intact, no nystagmus        Lymph:No Cervical lymphadenopathy present     ENT:  no pallor or cyanosis, dentition good        Neck:  carotid pulses are full and equal bilaterally, JVP normal, no carotid bruit        Respiratory:  normal breath sounds, clear to auscultation, normal A-P diameter, normal symmetry, normal respiratory excursion, no use of accessory muscles         Cardiac: regular " rhythm;normal S1 and S2;apical impulse not displaced;no murmurs, gallops or rubs detected   S4            pulses full and equal, no bruits auscultated                                        GI:  abdomen soft, non-tender, BS normoactive, no mass, no HSM, no bruits;abdomen soft        Extremities and Muscular Skeletal:  no deformities, clubbing, cyanosis, erythema observed;no edema              Neurological:  no gross motor deficits;affect appropriate        Psych:  Alert and Oriented x 3        CC  No referring provider defined for this encounter.

## 2019-09-23 ENCOUNTER — OFFICE VISIT (OUTPATIENT)
Dept: FAMILY MEDICINE | Facility: CLINIC | Age: 69
End: 2019-09-23

## 2019-09-23 VITALS
OXYGEN SATURATION: 96 % | WEIGHT: 213 LBS | DIASTOLIC BLOOD PRESSURE: 80 MMHG | SYSTOLIC BLOOD PRESSURE: 132 MMHG | RESPIRATION RATE: 16 BRPM | BODY MASS INDEX: 28.1 KG/M2 | HEART RATE: 54 BPM

## 2019-09-23 DIAGNOSIS — J45.20 MILD INTERMITTENT ASTHMA WITHOUT COMPLICATION: Primary | ICD-10-CM

## 2019-09-23 DIAGNOSIS — Z11.59 MEASLES SCREENING: ICD-10-CM

## 2019-09-23 PROBLEM — N18.30 CKD (CHRONIC KIDNEY DISEASE) STAGE 3, GFR 30-59 ML/MIN (H): Status: ACTIVE | Noted: 2019-09-23

## 2019-09-23 PROCEDURE — 99214 OFFICE O/P EST MOD 30 MIN: CPT | Performed by: FAMILY MEDICINE

## 2019-09-23 RX ORDER — FAMOTIDINE 20 MG
3000 TABLET ORAL EVERY MORNING
COMMUNITY

## 2019-09-23 RX ORDER — BECLOMETHASONE DIPROPIONATE HFA 80 UG/1
1 AEROSOL, METERED RESPIRATORY (INHALATION) 2 TIMES DAILY
Qty: 1 INHALER | Refills: 11 | Status: SHIPPED | OUTPATIENT
Start: 2019-09-23 | End: 2022-07-12

## 2019-09-23 NOTE — PROGRESS NOTES
Asthma stable.  Has not had any recent breathing troubles beyond usual baseline.  Has not any acute respiratory events.  Remains with intermittent cough, mild shortness of breath with overexertion as per usual.  Using medication as directed with reported side effects    ACT Total Scores 2/24/2017 4/27/2018 9/23/2019   ACT TOTAL SCORE - - -   ASTHMA ER VISITS - - -   ASTHMA HOSPITALIZATIONS - - -   ACT TOTAL SCORE (Goal Greater than or Equal to 20) 20 22 19   In the past 12 months, how many times did you visit the emergency room for your asthma without being admitted to the hospital? 0 0 0   In the past 12 months, how many times were you hospitalized overnight because of your asthma? 0 0 0     Has history of hyperlipidemia.  On statin for this, denies any significant side effects of this medication.      Latest labs reviewed:    Recent Labs   Lab Test 04/17/19  0720 04/20/18  0726  07/01/15  0752   CHOL 144 141   < > 132   HDL 53 56   < > 49   LDL 67 66   < > 62*   TRIG 120 95   < > 105   CHOLHDLRATIO  --   --   --  2.7    < > = values in this interval not displayed.        Lab Results   Component Value Date    AST 24 12/19/2016      Wants to be sure up to date on the immunization  Problem(s) Oriented visit      ROS:  General and CV completed and negative except as noted above     HISTORY:   reports current alcohol use of about 0.8 standard drinks of alcohol per week.   reports that he has never smoked. He has never used smokeless tobacco.    Past Medical History:   Diagnosis Date     ACP (advance care planning)      Bruit      CAD (coronary artery disease) 2002     Esophageal reflux 5/17/2013     Family history of ischemic heart disease      Hyperlipidaemia      Hypertension      Past Surgical History:   Procedure Laterality Date     HEART CATH, ANGIOPLASTY  10/4/02    3.0 X 8 mm Velocity stent     ROTATOR CUFF REPAIR RT/LT  2009    Dr. Butler       EXAM:  BP: 132/80   Pulse: 54    Temp: Data Unavailable    Wt  Readings from Last 2 Encounters:   09/23/19 96.6 kg (213 lb)   08/21/19 97.1 kg (214 lb)       BMI= Body mass index is 28.1 kg/m .    EXAM:  APPEARANCE: = Relaxed and in no distress  Conj/Eyelids = noninjected and lids and lashes are without inflammation  PERRLA/Irises = Pupils Round Reactive to Light and Irisis without inflammation  Ears/Nose = External structures and Nares have usual shape and form  Ear canals and TM's = Canals are not inflammed and have none or little wax and the drums are not injected and have a light reflex   Lips/Teeth/Gums = No lesions seen, good dentition, and gums seem healthy  Oropharynx = No leukoplakia, No injection to the tissues, Normal Uvula  Neck = No anterior or posterior adenopathy appreciated.  Resp effort = Calm regular breathing  Breath Sounds = Good air movement with no rales or rhonchi in any lung fields  Mood/Affect = Cooperative and interested      Assessment/Plan:  Peter was seen today for recheck medication.    Diagnoses and all orders for this visit:    Measles screening  -     Measles/Mumps/Rubella Immunity ([facility])    Mild intermittent asthma without complication  Discussed asthma in detail and discussed how their breathing symptoms and the pattern of the shortness of breath fits with asthma.   Discussed pathophysiology of asthma and treatments based on the degree of symptoms.   Discussed triggers for asthma in general, and those specific to the patient.  Also told them to anticipate potentially more intense coughing and shortness of breath with any URI (regardless of bacterial or viral etiology) and to be prepared to use the albuterol more often if needed and to return and see me for any such exacerbation.    I recommended having rescue inhaler available and using all throughout the year as needed, demonstrated proper MDI technique for them.  Emphasized the need for them to let me know if there are any changes for the worse in their breathing related to asthma,  "either acute or chronic and to seek emergency care if the breathing acutely worsens in a sever manner.      -     QVAR REDIHALER 80 MCG/ACT inhaler; Inhale 1 puff into the lungs 2 times daily Only in winter or Around cats See Admin Instructions Only in winter or Around cats         COUNSELING:   reports that he has never smoked. He has never used smokeless tobacco.    Estimated body mass index is 28.1 kg/m  as calculated from the following:    Height as of 8/21/19: 1.854 m (6' 1\").    Weight as of this encounter: 96.6 kg (213 lb).       Appropriate preventive services were discussed with this patient, including applicable screening as appropriate for cardiovascular disease, diabetes, osteopenia/osteoporosis, and glaucoma.  As appropriate for age/gender, discussed screening for colorectal cancer, prostate cancer, breast cancer, and cervical cancer. Checklist reviewing preventive services available has been given to the patient.    Reviewed patients plan of care and provided an AVS. The Basic Care Plan (routine screening as documented in Health Maintenance) for Peter meets the Care Plan requirement. This Care Plan has been established and reviewed with the  Patient.      The following health maintenance items are reviewed in Epic and correct as of today:  Health Maintenance   Topic Date Due     AORTIC ANEURYSM SCREENING (SYSTEM ASSIGNED)  04/16/2017     ASTHMA CONTROL TEST  10/27/2018     PHQ-2  01/01/2019     ASTHMA ACTION PLAN  04/27/2019     INFLUENZA VACCINE (1) 09/01/2019     FALL RISK ASSESSMENT  09/10/2019     MEDICARE ANNUAL WELLNESS VISIT  09/10/2019     LIPID  04/17/2020     COLONOSCOPY  07/18/2021     ADVANCE CARE PLANNING  09/10/2023     DTAP/TDAP/TD IMMUNIZATION (4 - Td) 10/24/2023     HEPATITIS C SCREENING  Completed     PNEUMOCOCCAL IMMUNIZATION 65+ LOW/MEDIUM RISK  Completed     ZOSTER IMMUNIZATION  Completed     IPV IMMUNIZATION  Aged Out     MENINGITIS IMMUNIZATION  Aged Out       Willie Castellanos " Cyril  [facility]  For any issues my office # is [phone]

## 2019-09-23 NOTE — LETTER
My Asthma Action Plan    Name: Peter Hernandez II   YOB: 1950  Date: 9/23/2019   My doctor: Willie Nam MD   My clinic: [facility]        My Rescue Medicine:   Albuterol inhaler (Proair/Ventolin/Proventil HFA)  2-4 puffs EVERY 4 HOURS as needed. Use a spacer if recommended by your provider.   My Asthma Severity:   Intermittent / Exercise Induced  Know your asthma triggers: Patient is unaware of triggers             GREEN ZONE   Good Control    I feel good    No cough or wheeze    Can work, sleep and play without asthma symptoms       Take your asthma control medicine every day.     1. If exercise triggers your asthma, take your rescue medication    15 minutes before exercise or sports, and    During exercise if you have asthma symptoms  2. Spacer to use with inhaler: If you have a spacer, make sure to use it with your inhaler             YELLOW ZONE Getting Worse  I have ANY of these:    I do not feel good    Cough or wheeze    Chest feels tight    Wake up at night   1. Keep taking your Green Zone medications  2. Start taking your rescue medicine:    every 20 minutes for up to 1 hour. Then every 4 hours for 24-48 hours.  3. If you stay in the Yellow Zone for more than 12-24 hours, contact your doctor.  4. If you do not return to the Green Zone in 12-24 hours or you get worse, start taking your oral steroid medicine if prescribed by your provider.           RED ZONE Medical Alert - Get Help  I have ANY of these:    I feel awful    Medicine is not helping    Breathing getting harder    Trouble walking or talking    Nose opens wide to breathe       1. Take your rescue medicine NOW  2. If your provider has prescribed an oral steroid medicine, start taking it NOW  3. Call your doctor NOW  4. If you are still in the Red Zone after 20 minutes and you have not reached your doctor:    Take your rescue medicine again and    Call 911 or go to the emergency room right away    See your  regular doctor within 2 weeks of an Emergency Room or Urgent Care visit for follow-up treatment.          Annual Reminders:  Meet with Asthma Educator,  Flu Shot in the Fall, consider Pneumonia Vaccination for patients with asthma (aged 19 and older).    Pharmacy: [id number] - [place], [place] - [address]                        Asthma Triggers  How To Control Things That Make Your Asthma Worse    Triggers are things that make your asthma worse.  Look at the list below to help you find your triggers and   what you can do about them. You can help prevent asthma flare-ups by staying away from your triggers.      Trigger                                                          What you can do   Cigarette Smoke  Tobacco smoke can make asthma worse. Do not allow smoking in your home, car or around you.  Be sure no one smokes at a child s day care or school.  If you smoke, ask your health care provider for ways to help you quit.  Ask family members to quit too.  Ask your health care provider for a referral to Quit Plan to help you quit smoking, or call 3-484-483-PLAN.     Colds, Flu, Bronchitis  These are common triggers of asthma. Wash your hands often.  Don t touch your eyes, nose or mouth.  Get a flu shot every year.     Dust Mites  These are tiny bugs that live in cloth or carpet. They are too small to see. Wash sheets and blankets in hot water every week.   Encase pillows and mattress in dust mite proof covers.  Avoid having carpet if you can. If you have carpet, vacuum weekly.   Use a dust mask and HEPA vacuum.   Pollen and Outdoor Mold  Some people are allergic to trees, grass, or weed pollen, or molds. Try to keep your windows closed.  Limit time out doors when pollen count is high.   Ask you health care provider about taking medicine during allergy season.     Animal Dander  Some people are allergic to skin flakes, urine or saliva from pets with fur or feathers. Keep pets with fur or feathers out  of your home.    If you can t keep the pet outdoors, then keep the pet out of your bedroom.  Keep the bedroom door closed.  Keep pets off cloth furniture and away from stuffed toys.     Mice, Rats, and Cockroaches  Some people are allergic to the waste from these pests.   Cover food and garbage.  Clean up spills and food crumbs.  Store grease in the refrigerator.   Keep food out of the bedroom.   Indoor Mold  This can be a trigger if your home has high moisture. Fix leaking faucets, pipes, or other sources of water.   Clean moldy surfaces.  Dehumidify basement if it is damp and smelly.   Smoke, Strong Odors, and Sprays  These can reduce air quality. Stay away from strong odors and sprays, such as perfume, powder, hair spray, paints, smoke incense, paint, cleaning products, candles and new carpet.   Exercise or Sports  Some people with asthma have this trigger. Be active!  Ask your doctor about taking medicine before sports or exercise to prevent symptoms.    Warm up for 5-10 minutes before and after sports or exercise.     Other Triggers of Asthma  Cold air:  Cover your nose and mouth with a scarf.  Sometimes laughing or crying can be a trigger.  Some medicines and food can trigger asthma.

## 2019-09-24 ASSESSMENT — ASTHMA QUESTIONNAIRES: ACT_TOTALSCORE: 19

## 2019-09-26 LAB
MUV IGG SER QL IA: 25.1 AU/ML
RUBELLA ANTIBODY IGG: 8.55 INDEX
RUBEOLA IGG ANTIBODY: 141 AU/ML

## 2019-10-01 ENCOUNTER — HEALTH MAINTENANCE LETTER (OUTPATIENT)
Age: 69
End: 2019-10-01

## 2019-11-10 ENCOUNTER — MEDICAL CORRESPONDENCE (OUTPATIENT)
Dept: FAMILY MEDICINE | Facility: CLINIC | Age: 69
End: 2019-11-10

## 2019-12-15 ENCOUNTER — HEALTH MAINTENANCE LETTER (OUTPATIENT)
Age: 69
End: 2019-12-15

## 2020-03-13 ENCOUNTER — OFFICE VISIT (OUTPATIENT)
Dept: FAMILY MEDICINE | Facility: CLINIC | Age: 70
End: 2020-03-13

## 2020-03-13 VITALS
TEMPERATURE: 97.8 F | SYSTOLIC BLOOD PRESSURE: 122 MMHG | HEART RATE: 58 BPM | WEIGHT: 218 LBS | DIASTOLIC BLOOD PRESSURE: 78 MMHG | OXYGEN SATURATION: 96 % | RESPIRATION RATE: 18 BRPM | BODY MASS INDEX: 28.76 KG/M2

## 2020-03-13 DIAGNOSIS — M79.672 LEFT FOOT PAIN: Primary | ICD-10-CM

## 2020-03-13 PROCEDURE — 73630 X-RAY EXAM OF FOOT: CPT | Mod: FY | Performed by: FAMILY MEDICINE

## 2020-03-13 PROCEDURE — 99213 OFFICE O/P EST LOW 20 MIN: CPT | Mod: 25 | Performed by: FAMILY MEDICINE

## 2020-03-13 RX ORDER — NAPROXEN 500 MG/1
500 TABLET ORAL 2 TIMES DAILY WITH MEALS
Qty: 40 TABLET | Refills: 1 | Status: SHIPPED | OUTPATIENT
Start: 2020-03-13 | End: 2020-04-17

## 2020-03-13 NOTE — PROGRESS NOTES
Two weeks ago noted ankle edema, tops of feet feeling some foot pains.  Shoes make better..  Walking on Sunday made top of foot painful.  Pain is 6/10  Gets a little warm not much erythema.  Better in the mornig    Likes to eat sausage. Never had gout.  Problem(s) Oriented visit      ROS:  General and Resp. completed and negative except as noted above     HISTORY:   reports current alcohol use of about 0.8 standard drinks of alcohol per week.   reports that he has never smoked. He has never used smokeless tobacco.    Past Medical History:   Diagnosis Date     ACP (advance care planning)      Bruit      CAD (coronary artery disease) 2002     Esophageal reflux 5/17/2013     Family history of ischemic heart disease      Hyperlipidaemia      Hypertension      Past Surgical History:   Procedure Laterality Date     HEART CATH, ANGIOPLASTY  10/4/02    3.0 X 8 mm Velocity stent     ROTATOR CUFF REPAIR RT/LT  2009    Dr. Butler       EXAM:  BP: 122/78   Pulse: 58    Temp: 97.8    Wt Readings from Last 2 Encounters:   03/13/20 98.9 kg (218 lb)   09/23/19 96.6 kg (213 lb)       BMI= Body mass index is 28.76 kg/m .    EXAM:  APPEARANCE: = Relaxed and in no distress  Conj/Eyelids = noninjected and lids and lashes are without inflammation  PERRLA/Irises = Pupils Round Reactive to Light and Irisis without inflammation  Ears/Nose = External structures and Nares have usual shape and form  Ear canals and TM's = Canals are not inflammed and have none or little wax and the drums are not injected and have a light reflex   Lips/Teeth/Gums = No lesions seen, good dentition, and gums seem healthy  Oropharynx = No leukoplakia, No injection to the tissues, Normal Uvula  Neck = No anterior or posterior adenopathy appreciated.  Resp effort = Calm regular breathing  Breath Sounds = Good air movement with no rales or rhonchi in any lung fields  Musculsktl: pain in the mid forefoot to palp, some swelling  Recent/Remote Memory = Alert and  "Oriented x 3  Mood/Affect = Cooperative and interested      Assessment/Plan:  Peter was seen today for musculoskeletal problem.    Diagnoses and all orders for this visit:    Left foot pain  -     X-ray lt Foot G/E 3 vws*  -     Uric Acid  Serum ([facility])  -     naproxen (NAPROSYN) 500 MG tablet; Take 1 tablet (500 mg) by mouth 2 times daily (with meals)        COUNSELING:   reports that he has never smoked. He has never used smokeless tobacco.    Estimated body mass index is 28.76 kg/m  as calculated from the following:    Height as of 8/21/19: 1.854 m (6' 1\").    Weight as of this encounter: 98.9 kg (218 lb).       Appropriate preventive services were discussed with this patient, including applicable screening as appropriate for cardiovascular disease, diabetes, osteopenia/osteoporosis, and glaucoma.  As appropriate for age/gender, discussed screening for colorectal cancer, prostate cancer, breast cancer, and cervical cancer. Checklist reviewing preventive services available has been given to the patient.    Reviewed patients plan of care and provided an AVS. The Basic Care Plan (routine screening as documented in Health Maintenance) for Peter meets the Care Plan requirement. This Care Plan has been established and reviewed with the  Patient.      The following health maintenance items are reviewed in Epic and correct as of today:  Health Maintenance   Topic Date Due     AORTIC ANEURYSM SCREENING (SYSTEM ASSIGNED)  04/16/2017     MEDICARE ANNUAL WELLNESS VISIT  09/10/2019     PHQ-2  01/01/2020     ASTHMA CONTROL TEST  03/23/2020     LIPID  04/17/2020     ASTHMA ACTION PLAN  09/23/2020     FALL RISK ASSESSMENT  09/23/2020     COLORECTAL CANCER SCREENING  07/18/2021     ADVANCE CARE PLANNING  09/10/2023     DTAP/TDAP/TD IMMUNIZATION (4 - Td) 10/24/2023     HEPATITIS C SCREENING  Completed     INFLUENZA VACCINE  Completed     PNEUMOCOCCAL IMMUNIZATION 65+ LOW/MEDIUM RISK  Completed     ZOSTER IMMUNIZATION  " Completed     IPV IMMUNIZATION  Aged Out     MENINGITIS IMMUNIZATION  Aged Out       Willie Nam  [facility]  For any issues my office # is [phone]

## 2020-03-14 LAB — URATE SERPL-MCNC: 4.7 MG/DL (ref 3.7–8.6)

## 2020-04-16 DIAGNOSIS — M79.672 LEFT FOOT PAIN: ICD-10-CM

## 2020-04-17 RX ORDER — NAPROXEN 500 MG/1
TABLET ORAL
Qty: 40 TABLET | Refills: 1 | Status: SHIPPED | OUTPATIENT
Start: 2020-04-17 | End: 2020-05-11

## 2020-05-11 DIAGNOSIS — M79.672 LEFT FOOT PAIN: ICD-10-CM

## 2020-05-12 RX ORDER — NAPROXEN 500 MG/1
TABLET ORAL
Qty: 40 TABLET | Refills: 1 | Status: SHIPPED | OUTPATIENT
Start: 2020-05-12 | End: 2020-09-13

## 2020-05-18 DIAGNOSIS — I10 ESSENTIAL HYPERTENSION: ICD-10-CM

## 2020-05-18 RX ORDER — HYDROCHLOROTHIAZIDE 12.5 MG/1
12.5 CAPSULE ORAL DAILY
Qty: 90 CAPSULE | Refills: 0 | Status: SHIPPED | OUTPATIENT
Start: 2020-05-18 | End: 2020-08-10

## 2020-05-28 DIAGNOSIS — E78.00 PURE HYPERCHOLESTEROLEMIA: ICD-10-CM

## 2020-05-28 RX ORDER — ROSUVASTATIN CALCIUM 20 MG/1
20 TABLET, COATED ORAL DAILY
Qty: 90 TABLET | Refills: 3 | Status: SHIPPED | OUTPATIENT
Start: 2020-05-28 | End: 2020-10-08

## 2020-05-28 NOTE — TELEPHONE ENCOUNTER
Crestor. LOV(acute) 3/13/20. Lipid 8/21/19.  Cholesterol   Date Value Ref Range Status   04/17/2019 144 <200 mg/dL Final   04/20/2018 141 <200 mg/dL Final     HDL Cholesterol   Date Value Ref Range Status   04/17/2019 53 >39 mg/dL Final   04/20/2018 56 >39 mg/dL Final     LDL Cholesterol Calculated   Date Value Ref Range Status   04/17/2019 67 <100 mg/dL Final     Comment:     Desirable:       <100 mg/dl   04/20/2018 66 <100 mg/dL Final     Comment:     Desirable:       <100 mg/dl     Triglycerides   Date Value Ref Range Status   04/17/2019 120 <150 mg/dL Final     Comment:     Fasting specimen   04/20/2018 95 <150 mg/dL Final     Comment:     Fasting specimen     Cholesterol/HDL Ratio   Date Value Ref Range Status   07/01/2015 2.7 <4.4 Final

## 2020-06-02 NOTE — TELEPHONE ENCOUNTER
I have left a message on the cell number for the patient to call the office and schedule an appointment for medication check and fasting labs.    Maxwell Bedolla,   [facility]  [phone]

## 2020-06-11 DIAGNOSIS — Z76.0 ENCOUNTER FOR MEDICATION REFILL: ICD-10-CM

## 2020-06-11 RX ORDER — AMLODIPINE BESYLATE 5 MG/1
5 TABLET ORAL DAILY
Qty: 90 TABLET | Refills: 0 | Status: SHIPPED | OUTPATIENT
Start: 2020-06-11 | End: 2020-09-03

## 2020-06-19 ENCOUNTER — OFFICE VISIT (OUTPATIENT)
Dept: FAMILY MEDICINE | Facility: CLINIC | Age: 70
End: 2020-06-19

## 2020-06-19 VITALS
HEART RATE: 60 BPM | TEMPERATURE: 98.5 F | BODY MASS INDEX: 28.12 KG/M2 | RESPIRATION RATE: 16 BRPM | HEIGHT: 73 IN | DIASTOLIC BLOOD PRESSURE: 82 MMHG | WEIGHT: 212.2 LBS | SYSTOLIC BLOOD PRESSURE: 128 MMHG

## 2020-06-19 DIAGNOSIS — N18.30 CKD (CHRONIC KIDNEY DISEASE) STAGE 3, GFR 30-59 ML/MIN (H): ICD-10-CM

## 2020-06-19 DIAGNOSIS — E78.00 HYPERCHOLESTEREMIA: ICD-10-CM

## 2020-06-19 DIAGNOSIS — M19.90 JOINT INFLAMMATION: ICD-10-CM

## 2020-06-19 DIAGNOSIS — B37.2 YEAST INFECTION OF THE SKIN: ICD-10-CM

## 2020-06-19 DIAGNOSIS — I10 BENIGN ESSENTIAL HYPERTENSION: Primary | ICD-10-CM

## 2020-06-19 DIAGNOSIS — Z71.89 ADVICE GIVEN ABOUT COVID-19 VIRUS INFECTION: ICD-10-CM

## 2020-06-19 PROCEDURE — 93050 ART PRESSURE WAVEFORM ANALYS: CPT | Performed by: FAMILY MEDICINE

## 2020-06-19 PROCEDURE — 36415 COLL VENOUS BLD VENIPUNCTURE: CPT | Performed by: FAMILY MEDICINE

## 2020-06-19 PROCEDURE — 99214 OFFICE O/P EST MOD 30 MIN: CPT | Performed by: FAMILY MEDICINE

## 2020-06-19 RX ORDER — TETRAHYDROZOLINE HCL 0.05 %
1 DROPS OPHTHALMIC (EYE) 3 TIMES DAILY
COMMUNITY
End: 2021-02-22

## 2020-06-19 RX ORDER — KETOCONAZOLE 20 MG/G
CREAM TOPICAL
Qty: 60 G | Refills: 0 | Status: SHIPPED | OUTPATIENT
Start: 2020-06-19 | End: 2022-02-04

## 2020-06-19 ASSESSMENT — MIFFLIN-ST. JEOR: SCORE: 1786.41

## 2020-06-19 NOTE — PROGRESS NOTES
Blood presure remains well controlled when checked out of clinic.    Reviewed last 6 BP readings in chart:  BP Readings from Last 6 Encounters:   06/19/20 128/82   03/13/20 122/78   09/23/19 132/80   08/21/19 132/76   04/17/19 124/76   09/10/18 138/78       he has not experienced any significant side effects from medications for hypertension.    NO active cardiac complaints or symptoms with exercise.    Recent inflamatory flares, began  7 days ago in the right foot.  Is not on allopurinol due to UA at 4.7    Wants sars antibody.    Problem(s) Oriented visit      ROS:  General and CV completed and negative except as noted above     HISTORY:   reports current alcohol use of about 0.8 standard drinks of alcohol per week.   reports that he has never smoked. He has never used smokeless tobacco.    Past Medical History:   Diagnosis Date     ACP (advance care planning)      Bruit      CAD (coronary artery disease) 2002     Esophageal reflux 5/17/2013     Family history of ischemic heart disease      Hyperlipidaemia      Hypertension      Past Surgical History:   Procedure Laterality Date     HEART CATH, ANGIOPLASTY  10/4/02    3.0 X 8 mm Velocity stent     ROTATOR CUFF REPAIR RT/LT  2009    Dr. Butler       EXAM:  BP: 128/82   Pulse: 60    Temp: 98.5    Wt Readings from Last 2 Encounters:   06/19/20 96.3 kg (212 lb 3.2 oz)   03/13/20 98.9 kg (218 lb)       BMI= Body mass index is 28 kg/m .    EXAM:  APPEARANCE: = Relaxed and in no distress  Conj/Eyelids = noninjected and lids and lashes are without inflammation  PERRLA/Irises = Pupils Round Reactive to Light and Irisis without inflammation  Neck = No anterior or posterior adenopathy appreciated.  Thyroid = Not enlarged and no masses felt  Resp effort = Calm regular breathing  Breath Sounds = Good air movement with no rales or rhonchi in any lung fields  Heart Rate, Rythym, & sounds (no Murm)  = Regular rate and rythym with no S3, S4, or murmer appreciated.  Carotid Art's  "= Pulses full and equal and no bruits appreciated  Abdomen = Soft, nontender, no masses, & bowel sounds in all quadrants  Liver/Spleen = Normal span and no splenomegaly noted  Digits and Nails = FROM in all finger joints, no nail dystrophy  Ext (edema) = No pretibial edema noted or elsewhere  Musculsktl =  Strength and ROM of major joints are within normal limits  SKIN = absent significant rashes or lesions   Recent/Remote Memory = Alert and Oriented x 3  Mood/Affect = Cooperative and interested      Assessment/Plan:  Peter was seen today for recheck medication, hypertension, discuss sars testing, arthritis and refill request.    Diagnoses and all orders for this visit:    Benign essential hypertension  -     C ART PRESS WAVEFORM ANALYS CENTRAL ART PRESSURE    Yeast infection of the skin  -     ketoconazole (NIZORAL) 2 % external cream; APPLY TOPICALLY DAILY    CKD (chronic kidney disease) stage 3, GFR 30-59 ml/min (H)    Joint inflammation  -     Uric Acid  Serum ([facility])    Advice given about COVID-19 virus infection  -     SARS CoV2 Roxie IgG ([facility])  -     SARS CoV2 Roxie IgA ([facility])  -     SARS CoV2 Roxie IgM ([facility])        COUNSELING:   reports that he has never smoked. He has never used smokeless tobacco.    Estimated body mass index is 28 kg/m  as calculated from the following:    Height as of this encounter: 1.854 m (6' 1\").    Weight as of this encounter: 96.3 kg (212 lb 3.2 oz).       Appropriate preventive services were discussed with this patient, including applicable screening as appropriate for cardiovascular disease, diabetes, osteopenia/osteoporosis, and glaucoma.  As appropriate for age/gender, discussed screening for colorectal cancer, prostate cancer, breast cancer, and cervical cancer. Checklist reviewing preventive services available has been given to the patient.    Reviewed patients plan of care and provided an AVS. The Basic Care Plan (routine screening as documented in Health Maintenance) " for Peter meets the Care Plan requirement. This Care Plan has been established and reviewed with the  Patient.      The following health maintenance items are reviewed in Epic and correct as of today:  Health Maintenance   Topic Date Due     AORTIC ANEURYSM SCREENING (SYSTEM ASSIGNED)  04/16/2017     MEDICARE ANNUAL WELLNESS VISIT  09/10/2019     PHQ-2  01/01/2020     ASTHMA CONTROL TEST  03/23/2020     LIPID  04/17/2020     ASTHMA ACTION PLAN  09/23/2020     FALL RISK ASSESSMENT  09/23/2020     COLORECTAL CANCER SCREENING  07/18/2021     ADVANCE CARE PLANNING  09/10/2023     DTAP/TDAP/TD IMMUNIZATION (4 - Td) 10/24/2023     HEPATITIS C SCREENING  Completed     INFLUENZA VACCINE  Completed     PNEUMOCOCCAL IMMUNIZATION 65+ LOW/MEDIUM RISK  Completed     ZOSTER IMMUNIZATION  Completed     IPV IMMUNIZATION  Aged Out     MENINGITIS IMMUNIZATION  Aged Out       Willie Nam  [facility]  For any issues my office # is [phone]

## 2020-06-20 LAB
ALT SERPL-CCNC: 23 IU/L (ref 0–44)
BUN SERPL-MCNC: 17 MG/DL (ref 8–27)
BUN/CREATININE RATIO: 16 (ref 10–24)
CALCIUM SERPL-MCNC: 9.6 MG/DL (ref 8.6–10.2)
CHLORIDE SERPLBLD-SCNC: 104 MMOL/L (ref 96–106)
CHOLEST SERPL-MCNC: 135 MG/DL (ref 100–199)
CREAT SERPL-MCNC: 1.04 MG/DL (ref 0.76–1.27)
EGFR IF AFRICN AM: 85 ML/MIN/1.73
EGFR IF NONAFRICN AM: 73 ML/MIN/1.73
GLUCOSE SERPL-MCNC: 112 MG/DL (ref 65–99)
HDLC SERPL-MCNC: 55 MG/DL
LDL/HDL RATIO: 1.2 RATIO (ref 0–3.6)
LDLC SERPL CALC-MCNC: 64 MG/DL (ref 0–99)
POTASSIUM SERPL-SCNC: 4.7 MMOL/L (ref 3.5–5.2)
SODIUM SERPL-SCNC: 142 MMOL/L (ref 134–144)
TOTAL CO2: 26 MMOL/L (ref 20–29)
TRIGL SERPL-MCNC: 80 MG/DL (ref 0–149)
URATE SERPL-MCNC: 4.1 MG/DL (ref 3.7–8.6)
VLDLC SERPL CALC-MCNC: 16 MG/DL (ref 5–40)

## 2020-06-20 ASSESSMENT — ASTHMA QUESTIONNAIRES: ACT_TOTALSCORE: 18

## 2020-06-23 LAB
SARS COV2 ABY IGA: NEGATIVE
SARS COV2 ABY IGG: NEGATIVE
SARS COV2 ABY IGM: NEGATIVE

## 2020-07-01 ENCOUNTER — OFFICE VISIT (OUTPATIENT)
Dept: FAMILY MEDICINE | Facility: CLINIC | Age: 70
End: 2020-07-01

## 2020-07-01 VITALS
RESPIRATION RATE: 16 BRPM | HEART RATE: 69 BPM | HEIGHT: 73 IN | BODY MASS INDEX: 28.1 KG/M2 | WEIGHT: 212 LBS | OXYGEN SATURATION: 96 % | DIASTOLIC BLOOD PRESSURE: 80 MMHG | SYSTOLIC BLOOD PRESSURE: 120 MMHG | TEMPERATURE: 98.3 F

## 2020-07-01 DIAGNOSIS — R10.32 LLQ ABDOMINAL PAIN: Primary | ICD-10-CM

## 2020-07-01 LAB
% GRANULOCYTES: 69.8 % (ref 42.2–75.2)
BACTERIA URINE: NORMAL
BILIRUB UR QL STRIP: 0
BLOOD URINE DIP: 0
CASTS/LPF: NORMAL
COLOR UR: YELLOW
CRYSTAL URINE: NORMAL
EPITHELIAL CELLS - QUEST: NORMAL
GLUCOSE UR STRIP-MCNC: 0 MG/DL
HCT VFR BLD AUTO: 43.7 % (ref 39–51)
HEMOGLOBIN: 14.9 G/DL (ref 13.4–17.5)
KETONES UR QL STRIP: 0
LEUKOCYTE ESTERASE URINE DIP: 0
LYMPHOCYTES NFR BLD AUTO: 22.6 % (ref 20.5–51.1)
MCH RBC QN AUTO: 30.3 PG (ref 27–31)
MCHC RBC AUTO-ENTMCNC: 34.2 G/DL (ref 33–37)
MCV RBC AUTO: 88.6 FL (ref 80–100)
MONOCYTES NFR BLD AUTO: 7.6 % (ref 1.7–9.3)
MUCOUS URINE: NORMAL
NITRITE UR QL STRIP: NORMAL
PH UR STRIP: 5.5 PH (ref 5–9)
PLATELET # BLD AUTO: 178 K/UL (ref 140–450)
PROT UR QL: 0 MG/DL (ref ?–0.01)
RBC # BLD AUTO: 4.93 X10/CMM (ref 4.2–5.9)
RBC URINE: NORMAL
SP GR UR STRIP: 1.01 (ref 1–1.02)
UROBILINOGEN UR QL STRIP: 0.2 EU/DL (ref 0.2–1)
WBC # BLD AUTO: 11.1 X10/CMM (ref 3.8–11)
WBC URINE: NORMAL

## 2020-07-01 PROCEDURE — 36415 COLL VENOUS BLD VENIPUNCTURE: CPT | Performed by: NURSE PRACTITIONER

## 2020-07-01 PROCEDURE — 81003 URINALYSIS AUTO W/O SCOPE: CPT | Performed by: NURSE PRACTITIONER

## 2020-07-01 PROCEDURE — 99214 OFFICE O/P EST MOD 30 MIN: CPT | Performed by: NURSE PRACTITIONER

## 2020-07-01 PROCEDURE — 85025 COMPLETE CBC W/AUTO DIFF WBC: CPT | Performed by: NURSE PRACTITIONER

## 2020-07-01 ASSESSMENT — MIFFLIN-ST. JEOR: SCORE: 1785.51

## 2020-07-01 NOTE — PATIENT INSTRUCTIONS
Labs today  UA today  Do the metamucil with at least two liters of water daily, get 20-25g fiber, walk daily  You can try Miralax daily as well  If this does not help over the next week, let us know and we will proceed with imaging  If you develop a fever with worsening pain, seek emergency

## 2020-07-01 NOTE — PROGRESS NOTES
"Problem(s) Oriented visit        SUBJECTIVE:                                                    Peter Hernandez II is a 70 year old male who presents to clinic today for the following health issues :    Reuben comes to the clinic feeling generally well. However, about 2.5 weeks ago he developed a \"twinge\" to his LLQ that he noticed after getting up from a sitting position. This has since been occurring intermittently with movement, stable over time. He also notes it with palpation. Reports the pain is a dull, mild, non-radiating twinge, not moving over time. Atraumatic onset. He has not had this pain before- feels it is deeper than a musculoskeletal pain. Has not tried any remedies for this. Does note that he has been increasingly constipated- uses a stool softener but not a laxative, not helpful. LBM yesterday, passing flatus, able to eat/drink. Some fatigue at times, but not significant. Wakes 1-2x at night to void, but this is baseline. No fevers, chills, nausea/vomiting, diffuse abdominal pain, hematuria, urgency, dysuria, melena, diarrhea, flank pain, pelvic pain, swelling in his legs, unintended weight loss, palpable masses, ripping/tearing sensation in the abdomen. He does not feel the pain more when he coughs, does not feel he has a hernia. He does not smoke, occasional alcohol use.    Problem list, Medication list, Allergies, and Medical/Social/Surgical histories reviewed in [facility] and updated as appropriate.   Additional history: as documented    ROS:  General, CV, resp, GI, , MSK negative except as listed per HPI    Histories:   Patient Active Problem List   Diagnosis     Hypercholesteremia     CAD (coronary artery disease)     ACP (advance care planning)     Esophageal reflux     Health Care Home     Family history of ischemic heart disease     Asthma     Benign essential hypertension     CKD (chronic kidney disease) stage 3, GFR 30-59 ml/min (H)     Past Surgical History:   Procedure Laterality Date     " HEART CATH, ANGIOPLASTY  10/4/02    3.0 X 8 mm Velocity stent     ROTATOR CUFF REPAIR RT/LT  2009    Dr. Butler       Social History     Tobacco Use     Smoking status: Never Smoker     Smokeless tobacco: Never Used   Substance Use Topics     Alcohol use: Yes     Alcohol/week: 0.8 standard drinks     Types: 1 Standard drinks or equivalent per week     Comment: ocasionally     Family History   Problem Relation Age of Onset     C.A.D. Father      Cancer Father         bone     Cerebrovascular Disease Mother      Coronary Artery Disease Brother      Heart Disease Brother          Current Outpatient Medications   Medication Sig Dispense Refill     albuterol (VENTOLIN HFA) 108 (90 BASE) MCG/ACT inhaler Inhale 2 puffs into the lungs every 6 hours as needed for shortness of breath / dyspnea or wheezing       amLODIPine (NORVASC) 5 MG tablet Take 1 tablet (5 mg) by mouth daily 90 tablet 0     aspirin 81 MG tablet Take 1 tablet by mouth daily.  3     hydrochlorothiazide (MICROZIDE) 12.5 MG capsule Take 1 capsule (12.5 mg) by mouth daily 90 capsule 0     HYDROcodone-acetaminophen (NORCO) 5-325 MG per tablet Take 1 tablet by mouth every 6 hours as needed for severe pain 20 tablet 0     ketoconazole (NIZORAL) 2 % external cream APPLY TOPICALLY DAILY 60 g 0     lisinopril (PRINIVIL/ZESTRIL) 10 MG tablet TAKE 1 TABLET BY MOUTH ONCE DAILY. 90 tablet 3     Multiple Vitamin (DAILY MULTIVITAMIN PO) Take  by mouth.         naproxen (NAPROSYN) 500 MG tablet TAKE 1 TABLET BY MOUTH TWICE A DAY WITH MEALS 40 tablet 1     nitroGLYcerin (NITROSTAT) 0.4 MG sublingual tablet For chest pain place 1 tablet under the tongue every 5 minutes for 3 doses. If symptoms persist 5 minutes after 1st dose call 911. 25 tablet 3     Probiotic Product (PROBIOTIC-10 PO) Take by mouth daily       QVAR REDIHALER 80 MCG/ACT inhaler Inhale 1 puff into the lungs 2 times daily Only in winter or Around cats See Admin Instructions Only in winter or Around cats 1  "Inhaler 11     ranitidine (ZANTAC) 150 MG tablet Take 150 mg by mouth as needed.       rosuvastatin (CRESTOR) 20 MG tablet Take 1 tablet (20 mg) by mouth daily 90 tablet 3     tadalafil (CIALIS) 5 MG tablet Take 1 tablet by mouth as needed for erectile dysfunction. Never use with nitroglycerin, terazosin or doxazosin. 30 tablet 1     tetrahydrozoline (VISINE) 0.05 % ophthalmic solution 1 drop 3 times daily       Vitamin D, Cholecalciferol, 1000 units CAPS          OBJECTIVE:                                                    Physical Exam:    /80   Pulse 69   Temp 98.3  F (36.8  C)   Resp 16   Ht 1.854 m (6' 1\")   Wt 96.2 kg (212 lb)   SpO2 96%   BMI 27.97 kg/m      CONSTITUTIONAL: Alert non-toxic appearing male in no acute distress  HEAD: Normocephalic, atraumatic  EYES: PERRLA; no scleral icterus  NECK: Neck supple without lymphadenopathy, thyroid without enlargement or nodularity  RESPIRATORY: Lungs clear to auscultation, respirations unlabored  CV: Regular rate and rhythm, S1S2, no clicks, murmurs, rubs, or gallops; bilateral lower extremities without edema, dorsalis pedis pulses 2+ bilaterally  GASTROINTESTINAL: Normoactive bowel sounds x4 quadrants, abdomen soft, non-distended without masses or organomegaly, no pulsatile abdominal masses. No rebound tenderness, guarding, or rigidity. Negative Schumacher's sign, McBurney's point without tenderness. LLQ focally tender just superior to his ASIS.  GENITOURINARY: No suprapubic tenderness, no CVA tenderness  LYMPHATIC: Cervical, supraclavicular, and inguinal nodes without lymphadenopathy  MUSCULOSKELETAL: Moves all extremities, no obvious muscle wasting  NEUROLOGIC: No gross deficits, normal gait  SKIN: Appropriate color for race, warm and dry, no rashes or lesions to unclothed skin  PSYCHIATRIC: Pleasant and interactive, affect bright, makes appropriate eye contact, thought process logical       ASSESSMENT/PLAN:                                              "           Peter was seen today for abdominal pain.    Diagnoses and all orders for this visit:    LLQ abdominal pain  -     CBC with Diff/Plt ([facility])  -     Comp. Metabolic Panel (14) ([facility])  -     Urinalysis w/reflex protein, bili ([facility])  -     Referral to [facility]      On exam, he appears comfortable and non-toxic. Afebrile and hemodynamically stable without peritoneal signs. Suspect his symptoms could be due to his reports of constipation, though his CBC shows a mild leukocytosis- this resulted after our visit. Called him with this information, will obtain CT AP to eval for diverticulitis, given strict instructions when to seek emergency care. He feels safe monitoring his symptoms at home until he has his scan. Reviewed bowel regimen as well.    Patient needs assistance with ADLs: none identified today  Patient needs assistance with iADLs: none identified today    The following health maintenance items are reviewed in Epic and correct as of today:  Health Maintenance   Topic Date Due     AORTIC ANEURYSM SCREENING (SYSTEM ASSIGNED)  04/16/2017     MEDICARE ANNUAL WELLNESS VISIT  09/10/2019     INFLUENZA VACCINE (1) 09/01/2020     FALL RISK ASSESSMENT  09/23/2020     ASTHMA CONTROL TEST  12/19/2020     LIPID  06/19/2021     ASTHMA ACTION PLAN  06/19/2021     COLORECTAL CANCER SCREENING  07/18/2021     ADVANCE CARE PLANNING  09/10/2023     DTAP/TDAP/TD IMMUNIZATION (4 - Td) 10/24/2023     HEPATITIS C SCREENING  Completed     PHQ-2  Completed     PNEUMOCOCCAL IMMUNIZATION 65+ LOW/MEDIUM RISK  Completed     ZOSTER IMMUNIZATION  Completed     IPV IMMUNIZATION  Aged Out     MENINGITIS IMMUNIZATION  Aged Out       Patient Instructions   Labs today  UA today  Do the metamucil with at least two liters of water daily, get 20-25g fiber, walk daily  You can try Miralax daily as well  If this does not help over the next week, let us know and we will proceed with imaging  If you develop a fever with worsening pain,  seek emergency      JEANNE Brothers CNP  [facility]  [phone]    For any issues my office # is [phone]

## 2020-07-03 LAB
ALBUMIN SERPL-MCNC: 4.7 G/DL (ref 3.8–4.8)
ALBUMIN/GLOB SERPL: 2 {RATIO} (ref 1.2–2.2)
ALP SERPL-CCNC: 72 IU/L (ref 39–117)
ALT SERPL-CCNC: 25 IU/L (ref 0–44)
AST SERPL-CCNC: 23 IU/L (ref 0–40)
BILIRUB SERPL-MCNC: 0.3 MG/DL (ref 0–1.2)
BUN SERPL-MCNC: 23 MG/DL (ref 8–27)
BUN/CREATININE RATIO: 21 (ref 10–24)
CALCIUM SERPL-MCNC: 9.2 MG/DL (ref 8.6–10.2)
CHLORIDE SERPLBLD-SCNC: 98 MMOL/L (ref 96–106)
CREAT SERPL-MCNC: 1.07 MG/DL (ref 0.76–1.27)
EGFR IF AFRICN AM: 82 ML/MIN/1.73
EGFR IF NONAFRICN AM: 71 ML/MIN/1.73
GLOBULIN, TOTAL: 2.4 G/DL (ref 1.5–4.5)
GLUCOSE SERPL-MCNC: 88 MG/DL (ref 65–99)
POTASSIUM SERPL-SCNC: 3.8 MMOL/L (ref 3.5–5.2)
PROT SERPL-MCNC: 7.1 G/DL (ref 6–8.5)
SODIUM SERPL-SCNC: 137 MMOL/L (ref 134–144)
TOTAL CO2: 23 MMOL/L (ref 20–29)

## 2020-07-07 ENCOUNTER — TRANSFERRED RECORDS (OUTPATIENT)
Dept: FAMILY MEDICINE | Facility: CLINIC | Age: 70
End: 2020-07-07

## 2020-07-08 ENCOUNTER — TELEPHONE (OUTPATIENT)
Dept: FAMILY MEDICINE | Facility: CLINIC | Age: 70
End: 2020-07-08

## 2020-07-08 NOTE — TELEPHONE ENCOUNTER
Called patient with result of CT  abd/pelvis.  Informed patient that CT was unremarkable and does not show any cause for his LLQ pain.  Patient states he feels about the same.  He has an upcoming appointment with Dr Nam and will discuss with him at that visit.

## 2020-07-09 DIAGNOSIS — Z76.0 ENCOUNTER FOR MEDICATION REFILL: ICD-10-CM

## 2020-07-09 RX ORDER — LISINOPRIL 10 MG/1
TABLET ORAL
Qty: 90 TABLET | Refills: 0 | Status: SHIPPED | OUTPATIENT
Start: 2020-07-09 | End: 2020-10-08

## 2020-07-13 ENCOUNTER — OFFICE VISIT (OUTPATIENT)
Dept: FAMILY MEDICINE | Facility: CLINIC | Age: 70
End: 2020-07-13

## 2020-07-13 VITALS
SYSTOLIC BLOOD PRESSURE: 120 MMHG | BODY MASS INDEX: 27.96 KG/M2 | HEIGHT: 73 IN | WEIGHT: 211 LBS | DIASTOLIC BLOOD PRESSURE: 82 MMHG | HEART RATE: 57 BPM | OXYGEN SATURATION: 97 % | RESPIRATION RATE: 16 BRPM

## 2020-07-13 DIAGNOSIS — G44.219 EPISODIC TENSION-TYPE HEADACHE, NOT INTRACTABLE: ICD-10-CM

## 2020-07-13 DIAGNOSIS — N45.1 EPIDIDYMITIS: Primary | ICD-10-CM

## 2020-07-13 PROCEDURE — 99213 OFFICE O/P EST LOW 20 MIN: CPT | Performed by: FAMILY MEDICINE

## 2020-07-13 ASSESSMENT — MIFFLIN-ST. JEOR: SCORE: 1780.97

## 2020-07-13 NOTE — PROGRESS NOTES
"5-10 seconds of pain in the right head a few times     Some tenderness in the left testes    Problem(s) Oriented visit      ROS:  General and Resp. completed and negative except as noted above     HISTORY:   reports current alcohol use of about 0.8 standard drinks of alcohol per week.   reports that he has never smoked. He has never used smokeless tobacco.    Past Medical History:   Diagnosis Date     ACP (advance care planning)      Bruit      CAD (coronary artery disease) 2002     Esophageal reflux 5/17/2013     Family history of ischemic heart disease      Hyperlipidaemia      Hypertension      Past Surgical History:   Procedure Laterality Date     HEART CATH, ANGIOPLASTY  10/4/02    3.0 X 8 mm Velocity stent     ROTATOR CUFF REPAIR RT/LT  2009    Dr. Butler       EXAM:  BP: 120/82   Pulse: 57    Temp: Data Unavailable    Wt Readings from Last 2 Encounters:   07/13/20 95.7 kg (211 lb)   07/01/20 96.2 kg (212 lb)       BMI= Body mass index is 27.84 kg/m .    EXAM:  APPEARANCE: = Relaxed and in no distress   =  testicles normal without atrophy or masses, penis normal without urethral discharge and epidimal tenderness  Digits and Nails = FROM in all finger joints, no nail dystrophy      Assessment/Plan:  Peter was seen today for gastrointestinal problem.    Diagnoses and all orders for this visit:    Epididymitis    Episodic tension-type headache, not intractable        COUNSELING:   reports that he has never smoked. He has never used smokeless tobacco.    Estimated body mass index is 27.84 kg/m  as calculated from the following:    Height as of this encounter: 1.854 m (6' 1\").    Weight as of this encounter: 95.7 kg (211 lb).       Appropriate preventive services were discussed with this patient, including applicable screening as appropriate for cardiovascular disease, diabetes, osteopenia/osteoporosis, and glaucoma.  As appropriate for age/gender, discussed screening for colorectal cancer, prostate cancer, " breast cancer, and cervical cancer. Checklist reviewing preventive services available has been given to the patient.    Reviewed patients plan of care and provided an AVS. The Basic Care Plan (routine screening as documented in Health Maintenance) for Peter meets the Care Plan requirement. This Care Plan has been established and reviewed with the  Patient.      The following health maintenance items are reviewed in Epic and correct as of today:  Health Maintenance   Topic Date Due     AORTIC ANEURYSM SCREENING (SYSTEM ASSIGNED)  04/16/2017     MEDICARE ANNUAL WELLNESS VISIT  09/10/2019     INFLUENZA VACCINE (1) 09/01/2020     FALL RISK ASSESSMENT  09/23/2020     ASTHMA CONTROL TEST  12/19/2020     LIPID  06/19/2021     ASTHMA ACTION PLAN  06/19/2021     COLORECTAL CANCER SCREENING  07/18/2021     ADVANCE CARE PLANNING  09/10/2023     DTAP/TDAP/TD IMMUNIZATION (4 - Td) 10/24/2023     HEPATITIS C SCREENING  Completed     PHQ-2  Completed     PNEUMOCOCCAL IMMUNIZATION 65+ LOW/MEDIUM RISK  Completed     ZOSTER IMMUNIZATION  Completed     IPV IMMUNIZATION  Aged Out     MENINGITIS IMMUNIZATION  Aged Out       Willie Nam  [facility]  For any issues my office # is [phone]

## 2020-07-13 NOTE — PATIENT INSTRUCTIONS
Epididymitis          What is epididymitis?   Epididymitis is an inflammation (irritation and swelling) of the epididymis. The epididymis is the firm tube at the back of each testicle. This coiled tube stores and carries sperm. One or both testicles may be affected, but it most often happens on just 1 side. The inflammation causes pain and swelling.  How does it occur?   Bacterial infections can cause epididymitis, such as:  urinary tract infections   sexually transmitted diseases such as chlamydia or gonorrhea   infection of the prostate gland.  It may also happen if you have:  a fungal infection   Henoch-Schönlein purpura, which is a disease that causes a rash and swelling of joints   removal of the prostate   a medical procedure that affects your urinary tract, such as cystoscopy or a catheter.  Sometimes men have epididymitis when they are taking a heart medicine called amiodarone.  What are the symptoms?   Possible symptoms are:  red, warm skin on the scrotum   tender, swollen testicle or groin area   testicle pain that feels worse when you have a bowel movement   fever and chills   nausea   discharge from the urethra (the opening at the end of the penis)   pain or burning when you urinate   pain with sex   lump in a testicle   discomfort in your lower abdomen, flank, or pelvis   blood in the semen.  If you have pain in the testicles and you don t know what is causing it, contact your healthcare provider right away. It could be a symptom of a more serious problem.   How is it diagnosed?   Your healthcare provider will examine your groin and scrotum. You may have a rectal exam. You may also have these tests:  urine tests   tests for chlamydia and gonorrhea   blood tests.  How is it treated?   Your healthcare provider may prescribe antibiotic medicine. Your sexual partner may need to be treated at the same time to prevent reinfection. Your provider may prescribe another medicine for pain and  inflammation. Sometimes severe cases need surgery.  If you have an infection, it is very important to have a follow-up visit with your healthcare provider to make sure the infection is completely cleared up.  How long do the effects last?   The pain usually gets better in 1 to 3 days. If it is caused by a bacterial infection, the symptoms may come back if antibiotics do not kill all of the bacteria. Symptoms may also come back if bacteria from the urinary tract or from sexual contact reinfect the epididymis. If this happens, you will need more treatment with antibiotics. It is important to treat the infection completely to try to prevent chronic epididymitis and to keep the infection from spreading to the testicle. Spread of the infection to the testicles could make you infertile (unable to have children).  Chronic epididymitis is more of a problem. Why some men get chronic epididymitis is not known. Chronic means that the infection continues even after treatment or that the infection or symptoms similar to infection return frequently. The discomfort or pain of chronic epididymitis can be treated with antibiotics or anti-inflammatory medicine. If antibiotics and pain medicine do not help, surgery to remove the epididymis may be necessary.   How can I take care of myself?   Take all of the medicine prescribed by your healthcare provider.   Follow your provider's directions.   If you are very uncomfortable, you may need to rest in bed for a couple of days.   Raise the scrotum by putting a rolled-up towel under it when you are resting.   Use a cloth-covered ice pack to help relieve the pain. Do not leave the ice pack on your skin for longer than 20 minutes and do not use it more often than once every hour.   Wear an athletic supporter or jockey shorts instead of boxers to help relieve discomfort.   If you have an infection, don t have sex until the infection clears up.  What can I do to help prevent epididymitis?    Keep the penis and scrotum clean.   If you have symptoms of burning when you urinate or a discharge from the penis, see your healthcare provider promptly.   Use a latex or polyurethane condom every time you have sex to protect against sexually transmitted diseases.    Published by [employer].  This content is reviewed periodically and is subject to change as new health information becomes available. The information is intended to inform and educate and is not a replacement for medical evaluation, advice, diagnosis or treatment by a healthcare professional.  Developed by [employer].    2011 [facility] and/or its affiliates. All rights reserved.         Copyright   [employer] 2011  [facility]ult [employer] [facility]

## 2020-08-10 DIAGNOSIS — I10 ESSENTIAL HYPERTENSION: ICD-10-CM

## 2020-08-10 RX ORDER — HYDROCHLOROTHIAZIDE 12.5 MG/1
12.5 CAPSULE ORAL DAILY
Qty: 90 CAPSULE | Refills: 0 | Status: SHIPPED | OUTPATIENT
Start: 2020-08-10 | End: 2020-10-08

## 2020-09-03 DIAGNOSIS — Z76.0 ENCOUNTER FOR MEDICATION REFILL: ICD-10-CM

## 2020-09-03 RX ORDER — AMLODIPINE BESYLATE 5 MG/1
5 TABLET ORAL DAILY
Qty: 90 TABLET | Refills: 0 | Status: SHIPPED | OUTPATIENT
Start: 2020-09-03 | End: 2020-10-08

## 2020-09-11 DIAGNOSIS — M79.672 LEFT FOOT PAIN: ICD-10-CM

## 2020-09-13 RX ORDER — NAPROXEN 500 MG/1
TABLET ORAL
Qty: 40 TABLET | Refills: 1 | Status: SHIPPED | OUTPATIENT
Start: 2020-09-13 | End: 2020-10-29

## 2020-09-29 ENCOUNTER — TELEPHONE (OUTPATIENT)
Dept: CARDIOLOGY | Facility: CLINIC | Age: 70
End: 2020-09-29

## 2020-09-29 NOTE — TELEPHONE ENCOUNTER
Wellness Screening Tool    Symptom Screening:    Do you have one of the following NEW symptoms:      Fever (subjective or >100.0)?  No    New cough? No    Shortness of breath? No    Chills? No    New loss of taste or smell? No    Generalized body aches? No    New persistent headache? No    New sore throat? No    Nausea, vomiting or diarrhea? No    Within the past 2 weeks, have you been exposed to someone with a known positive illness below?      COVID - 19 (known or suspected) No    Chicken pox?  No    Measles? No    Pertussis? No    Have you had a positive COVID-19 diagnostic test (nasal swab test) in the last 14 days or are you currently   on self-quarantine restrictions (i.e.travel restriction, exposure, etc?) No        Patient notified of visitor restriction: Yes  Patient informed to wear a mask: Yes    Patient's appointment status: Patient will be seen in clinic as scheduled on 9/30/20

## 2020-09-30 ENCOUNTER — OFFICE VISIT (OUTPATIENT)
Dept: CARDIOLOGY | Facility: CLINIC | Age: 70
End: 2020-09-30
Payer: COMMERCIAL

## 2020-09-30 VITALS
BODY MASS INDEX: 27.7 KG/M2 | HEIGHT: 73 IN | SYSTOLIC BLOOD PRESSURE: 132 MMHG | DIASTOLIC BLOOD PRESSURE: 76 MMHG | HEART RATE: 60 BPM | WEIGHT: 209 LBS

## 2020-09-30 DIAGNOSIS — I25.10 CORONARY ARTERY DISEASE INVOLVING NATIVE CORONARY ARTERY OF NATIVE HEART WITHOUT ANGINA PECTORIS: ICD-10-CM

## 2020-09-30 DIAGNOSIS — I10 BENIGN ESSENTIAL HYPERTENSION: ICD-10-CM

## 2020-09-30 DIAGNOSIS — I10 ESSENTIAL HYPERTENSION: Primary | ICD-10-CM

## 2020-09-30 DIAGNOSIS — E78.00 HYPERCHOLESTEREMIA: ICD-10-CM

## 2020-09-30 PROCEDURE — 99214 OFFICE O/P EST MOD 30 MIN: CPT | Performed by: INTERNAL MEDICINE

## 2020-09-30 ASSESSMENT — MIFFLIN-ST. JEOR: SCORE: 1771.9

## 2020-09-30 NOTE — LETTER
9/30/2020    Willie Nam MD  [address]  [facility] [address]    RE: Peter Hernandez II       Dear Colleague,    I had the pleasure of seeing Peter Hernandez II in the [facility].    HPI and Plan:   See dictation    Orders Placed This Encounter   Procedures     Basic metabolic panel     Lipid Profile     ALT     Follow-Up with Cardiologist       No orders of the defined types were placed in this encounter.      Medications Discontinued During This Encounter   Medication Reason     ranitidine (ZANTAC) 150 MG tablet Alternate therapy         Encounter Diagnoses   Name Primary?     Essential hypertension Yes     Coronary artery disease involving native coronary artery of native heart without angina pectoris      Hypercholesteremia      Benign essential hypertension        CURRENT MEDICATIONS:  Current Outpatient Medications   Medication Sig Dispense Refill     albuterol (VENTOLIN HFA) 108 (90 BASE) MCG/ACT inhaler Inhale 2 puffs into the lungs every 6 hours as needed for shortness of breath / dyspnea or wheezing       amLODIPine (NORVASC) 5 MG tablet Take 1 tablet (5 mg) by mouth daily 90 tablet 0     aspirin 81 MG tablet Take 1 tablet by mouth daily.  3     hydrochlorothiazide (MICROZIDE) 12.5 MG capsule Take 1 capsule (12.5 mg) by mouth daily 90 capsule 0     ketoconazole (NIZORAL) 2 % external cream APPLY TOPICALLY DAILY 60 g 0     lisinopril (ZESTRIL) 10 MG tablet TAKE 1 TABLET BY MOUTH ONCE DAILY. 90 tablet 0     Multiple Vitamin (DAILY MULTIVITAMIN PO) Take  by mouth.         naproxen (NAPROSYN) 500 MG tablet TAKE 1 TABLET BY MOUTH TWICE A DAY WITH MEALS 40 tablet 1     nitroGLYcerin (NITROSTAT) 0.4 MG sublingual tablet For chest pain place 1 tablet under the tongue every 5 minutes for 3 doses. If symptoms persist 5 minutes after 1st dose call 911. 25 tablet 3     Probiotic Product (PROBIOTIC-10 PO) Take by mouth daily       QVAR REDIHALER 80 MCG/ACT inhaler  Inhale 1 puff into the lungs 2 times daily Only in winter or Around cats See Admin Instructions Only in winter or Around cats 1 Inhaler 11     rosuvastatin (CRESTOR) 20 MG tablet Take 1 tablet (20 mg) by mouth daily 90 tablet 3     tadalafil (CIALIS) 5 MG tablet Take 1 tablet by mouth as needed for erectile dysfunction. Never use with nitroglycerin, terazosin or doxazosin. 30 tablet 1     tetrahydrozoline (VISINE) 0.05 % ophthalmic solution 1 drop 3 times daily       Vitamin D, Cholecalciferol, 1000 units CAPS          ALLERGIES     Allergies   Allergen Reactions     Cats      Simvastatin      achey       PAST MEDICAL HISTORY:  Past Medical History:   Diagnosis Date     ACP (advance care planning)     will bring copy in      Bruit      CAD (coronary artery disease) 2002    a stent     Esophageal reflux 5/17/2013     Family history of ischemic heart disease      Hyperlipidaemia      Hypertension        PAST SURGICAL HISTORY:  Past Surgical History:   Procedure Laterality Date     HEART CATH, ANGIOPLASTY  10/4/02    3.0 X 8 mm Velocity stent     ROTATOR CUFF REPAIR RT/LT  2009    Dr. Butler       FAMILY HISTORY:  Family History   Problem Relation Age of Onset     C.A.D. Father      Cancer Father         bone     Cerebrovascular Disease Mother      Coronary Artery Disease Brother      Heart Disease Brother        SOCIAL HISTORY:  Social History     Socioeconomic History     Marital status:      Spouse name: None     Number of children: None     Years of education: None     Highest education level: None   Occupational History     Occupation: Finance   Social Needs     Financial resource strain: None     Food insecurity     Worry: None     Inability: None     Transportation needs     Medical: None     Non-medical: None   Tobacco Use     Smoking status: Never Smoker     Smokeless tobacco: Never Used   Substance and Sexual Activity     Alcohol use: Yes     Alcohol/week: 0.8 standard drinks     Types: 1 Standard  "drinks or equivalent per week     Comment: occasionally     Drug use: No     Sexual activity: Yes     Partners: Female   Lifestyle     Physical activity     Days per week: None     Minutes per session: None     Stress: None   Relationships     Social connections     Talks on phone: None     Gets together: None     Attends Congregation service: None     Active member of club or organization: None     Attends meetings of clubs or organizations: None     Relationship status: None     Intimate partner violence     Fear of current or ex partner: None     Emotionally abused: None     Physically abused: None     Forced sexual activity: None   Other Topics Concern     Parent/sibling w/ CABG, MI or angioplasty before 65F 55M? No      Service Not Asked     Blood Transfusions Not Asked     Caffeine Concern Not Asked     Occupational Exposure Not Asked     Hobby Hazards Not Asked     Sleep Concern Yes     Comment: occ     Stress Concern No     Weight Concern Not Asked     Special Diet Not Asked     Back Care Not Asked     Exercise Yes     Comment: walks daily     Bike Helmet Not Asked     Seat Belt Not Asked     Self-Exams Not Asked   Social History Narrative     None       Review of Systems:  Skin:  Negative       Eyes:  Positive for glasses    ENT:  Negative      Respiratory:  Positive for dyspnea on exertion     Cardiovascular:  Negative      Gastroenterology: Negative      Genitourinary:  not assessed      Musculoskeletal:  Positive for gout    Neurologic:  Negative      Psychiatric:  Negative      Heme/Lymph/Imm:  Negative      Endocrine:  Negative        Physical Exam:  Vitals: /76   Pulse 60   Ht 1.854 m (6' 1\")   Wt 94.8 kg (209 lb)   BMI 27.57 kg/m      Constitutional:  cooperative, alert and oriented, well developed, well nourished, in no acute distress overweight      Skin:  warm and dry to the touch, no apparent skin lesions or masses noted          Head:  normocephalic, no masses or lesions    "     Eyes:  pupils equal and round, conjunctivae and lids unremarkable, sclera white, no xanthalasma, EOMS intact, no nystagmus        Lymph:No Cervical lymphadenopathy present     ENT:  no pallor or cyanosis, dentition good        Neck:  carotid pulses are full and equal bilaterally, JVP normal, no carotid bruit        Respiratory:  normal breath sounds, clear to auscultation, normal A-P diameter, normal symmetry, normal respiratory excursion, no use of accessory muscles         Cardiac: regular rhythm;normal S1 and S2;apical impulse not displaced;no murmurs, gallops or rubs detected   S4            pulses full and equal, no bruits auscultated                                        GI:  not assessed this visit        Extremities and Muscular Skeletal:  no deformities, clubbing, cyanosis, erythema observed;no edema              Neurological:  no gross motor deficits;affect appropriate        Psych:  Alert and Oriented x 3        CC  Willie Nam MD  [address] [place] [address]                Thank you for allowing me to participate in the care of your patient.      Sincerely,     Braulio Blanca MD, MD     [facility]    cc:   Willie Nam MD  [address] [place] [address]

## 2020-09-30 NOTE — LETTER
9/30/2020      Willie Nam MD  [address]  [facility] [address]      RE: Peter Hernandez II       Dear Colleague,    I had the pleasure of seeing Peter Hernandez II in the [facility].    Service Date: 09/30/2020      CARDIOLOGY FOLLOWUP VISIT       REFERRING PHYSICIAN:  Willie Nam MD        HISTORY OF PRESENT ILLNESS:  It is my pleasure to see your patient Peter Hernandez, who is a 70-year-old gentleman with a past history of coronary artery disease.  In 2012, he had stenting of the right coronary artery.  The circumflex had a 40% stenosis and the LAD a 20% stenosis.  Last assessment of myocardial ischemia was in 2015 with a stress echocardiogram, which showed no evidence of ischemia.      Since I last saw him, he has been doing well.  He has no chest pains, no chest pressure and no unusual shortness of breath.  His blood pressure is well controlled at 132/76.  His pulse is 60 beats per minute.  He has lost a significant amount of weight, and his BMI is now 27, and he weighs 209 pounds.  His lipid profile, which was drawn in June, was excellent with an LDL of 64, HDL of 55 and triglycerides of 80 with a total cholesterol of 135.  His renal function at that stage showed a creatinine of 1.07, BUN of 23, potassium of 3.8 and sodium of 137.  His GFR on 07/01 was 71, which is normal.  If you remember, this patient did have mild renal insufficiency in the past, possibly related to nonsteroidal anti-inflammatory drugs.      IMPRESSION:   1.  Coronary artery disease.  The patient is asymptomatic with respect to coronary artery disease with no symptoms suggestive of angina pectoris.   2.  Excellent lipid profile, well within secondary prevention guidelines.   3.  Essential hypertension.  Excellent blood pressure control.      PLAN:  We will continue the patient on his present medications.  We will see the patient back again in 1 year's time, but as always, he has been  told to contact us if he has any questions or any concerns.      cc:   Willie Nam MD   [facility] [facility] [address] [place] [address]         MONY FREEMAN MD, [facility]             D: 2020   T: 2020   MT: carrillo      Name:     NATALY CONTRERAS   MRN:      [id number]        Account:      [id number]   :      1950           Service Date: 2020      Document: [id number]          Outpatient Encounter Medications as of 2020   Medication Sig Dispense Refill     aspirin 81 MG tablet Take 1 tablet by mouth daily.  3     ketoconazole (NIZORAL) 2 % external cream APPLY TOPICALLY DAILY 60 g 0     Multiple Vitamin (DAILY MULTIVITAMIN PO) Take  by mouth.         naproxen (NAPROSYN) 500 MG tablet TAKE 1 TABLET BY MOUTH TWICE A DAY WITH MEALS 40 tablet 1     nitroGLYcerin (NITROSTAT) 0.4 MG sublingual tablet For chest pain place 1 tablet under the tongue every 5 minutes for 3 doses. If symptoms persist 5 minutes after 1st dose call 911. 25 tablet 3     Probiotic Product (PROBIOTIC-10 PO) Take by mouth daily       QVAR REDIHALER 80 MCG/ACT inhaler Inhale 1 puff into the lungs 2 times daily Only in winter or Around cats See Admin Instructions Only in winter or Around cats 1 Inhaler 11     tadalafil (CIALIS) 5 MG tablet Take 1 tablet by mouth as needed for erectile dysfunction. Never use with nitroglycerin, terazosin or doxazosin. 30 tablet 1     tetrahydrozoline (VISINE) 0.05 % ophthalmic solution 1 drop 3 times daily       Vitamin D, Cholecalciferol, 1000 units CAPS        [DISCONTINUED] albuterol (VENTOLIN HFA) 108 (90 BASE) MCG/ACT inhaler Inhale 2 puffs into the lungs every 6 hours as needed for shortness of breath / dyspnea or wheezing       [DISCONTINUED] amLODIPine (NORVASC) 5 MG tablet Take 1 tablet (5 mg) by mouth daily 90 tablet 0     [DISCONTINUED] hydrochlorothiazide (MICROZIDE) 12.5 MG capsule Take 1 capsule (12.5 mg) by mouth daily 90 capsule 0      [DISCONTINUED] lisinopril (ZESTRIL) 10 MG tablet TAKE 1 TABLET BY MOUTH ONCE DAILY. 90 tablet 0     [DISCONTINUED] rosuvastatin (CRESTOR) 20 MG tablet Take 1 tablet (20 mg) by mouth daily 90 tablet 3     [DISCONTINUED] ranitidine (ZANTAC) 150 MG tablet Take 150 mg by mouth as needed.       No facility-administered encounter medications on file as of 9/30/2020.        Again, thank you for allowing me to participate in the care of your patient.      Sincerely,    Braulio Blanca MD, MD     [facility]

## 2020-09-30 NOTE — PROGRESS NOTES
HPI and Plan:   See dictation    Orders Placed This Encounter   Procedures     Basic metabolic panel     Lipid Profile     ALT     Follow-Up with Cardiologist       No orders of the defined types were placed in this encounter.      Medications Discontinued During This Encounter   Medication Reason     ranitidine (ZANTAC) 150 MG tablet Alternate therapy         Encounter Diagnoses   Name Primary?     Essential hypertension Yes     Coronary artery disease involving native coronary artery of native heart without angina pectoris      Hypercholesteremia      Benign essential hypertension        CURRENT MEDICATIONS:  Current Outpatient Medications   Medication Sig Dispense Refill     albuterol (VENTOLIN HFA) 108 (90 BASE) MCG/ACT inhaler Inhale 2 puffs into the lungs every 6 hours as needed for shortness of breath / dyspnea or wheezing       amLODIPine (NORVASC) 5 MG tablet Take 1 tablet (5 mg) by mouth daily 90 tablet 0     aspirin 81 MG tablet Take 1 tablet by mouth daily.  3     hydrochlorothiazide (MICROZIDE) 12.5 MG capsule Take 1 capsule (12.5 mg) by mouth daily 90 capsule 0     ketoconazole (NIZORAL) 2 % external cream APPLY TOPICALLY DAILY 60 g 0     lisinopril (ZESTRIL) 10 MG tablet TAKE 1 TABLET BY MOUTH ONCE DAILY. 90 tablet 0     Multiple Vitamin (DAILY MULTIVITAMIN PO) Take  by mouth.         naproxen (NAPROSYN) 500 MG tablet TAKE 1 TABLET BY MOUTH TWICE A DAY WITH MEALS 40 tablet 1     nitroGLYcerin (NITROSTAT) 0.4 MG sublingual tablet For chest pain place 1 tablet under the tongue every 5 minutes for 3 doses. If symptoms persist 5 minutes after 1st dose call 911. 25 tablet 3     Probiotic Product (PROBIOTIC-10 PO) Take by mouth daily       QVAR REDIHALER 80 MCG/ACT inhaler Inhale 1 puff into the lungs 2 times daily Only in winter or Around cats See Admin Instructions Only in winter or Around cats 1 Inhaler 11     rosuvastatin (CRESTOR) 20 MG tablet Take 1 tablet (20 mg) by mouth daily 90 tablet 3      tadalafil (CIALIS) 5 MG tablet Take 1 tablet by mouth as needed for erectile dysfunction. Never use with nitroglycerin, terazosin or doxazosin. 30 tablet 1     tetrahydrozoline (VISINE) 0.05 % ophthalmic solution 1 drop 3 times daily       Vitamin D, Cholecalciferol, 1000 units CAPS          ALLERGIES     Allergies   Allergen Reactions     Cats      Simvastatin      achey       PAST MEDICAL HISTORY:  Past Medical History:   Diagnosis Date     ACP (advance care planning)     will bring copy in      Bruit      CAD (coronary artery disease) 2002    a stent     Esophageal reflux 5/17/2013     Family history of ischemic heart disease      Hyperlipidaemia      Hypertension        PAST SURGICAL HISTORY:  Past Surgical History:   Procedure Laterality Date     HEART CATH, ANGIOPLASTY  10/4/02    3.0 X 8 mm Velocity stent     ROTATOR CUFF REPAIR RT/LT  2009    Dr. Butler       FAMILY HISTORY:  Family History   Problem Relation Age of Onset     C.A.D. Father      Cancer Father         bone     Cerebrovascular Disease Mother      Coronary Artery Disease Brother      Heart Disease Brother        SOCIAL HISTORY:  Social History     Socioeconomic History     Marital status:      Spouse name: None     Number of children: None     Years of education: None     Highest education level: None   Occupational History     Occupation: Finance   Social Needs     Financial resource strain: None     Food insecurity     Worry: None     Inability: None     Transportation needs     Medical: None     Non-medical: None   Tobacco Use     Smoking status: Never Smoker     Smokeless tobacco: Never Used   Substance and Sexual Activity     Alcohol use: Yes     Alcohol/week: 0.8 standard drinks     Types: 1 Standard drinks or equivalent per week     Comment: occasionally     Drug use: No     Sexual activity: Yes     Partners: Female   Lifestyle     Physical activity     Days per week: None     Minutes per session: None     Stress: None  "  Relationships     Social connections     Talks on phone: None     Gets together: None     Attends [employer] service: None     Active member of club or organization: None     Attends meetings of clubs or organizations: None     Relationship status: None     Intimate partner violence     Fear of current or ex partner: None     Emotionally abused: None     Physically abused: None     Forced sexual activity: None   Other Topics Concern     Parent/sibling w/ CABG, MI or angioplasty before 65F 55M? No      Service Not Asked     Blood Transfusions Not Asked     Caffeine Concern Not Asked     Occupational Exposure Not Asked     Hobby Hazards Not Asked     Sleep Concern Yes     Comment: occ     Stress Concern No     Weight Concern Not Asked     Special Diet Not Asked     Back Care Not Asked     Exercise Yes     Comment: walks daily     Bike Helmet Not Asked     Seat Belt Not Asked     Self-Exams Not Asked   Social History Narrative     None       Review of Systems:  Skin:  Negative       Eyes:  Positive for glasses    ENT:  Negative      Respiratory:  Positive for dyspnea on exertion     Cardiovascular:  Negative      Gastroenterology: Negative      Genitourinary:  not assessed      Musculoskeletal:  Positive for gout    Neurologic:  Negative      Psychiatric:  Negative      Heme/Lymph/Imm:  Negative      Endocrine:  Negative        Physical Exam:  Vitals: /76   Pulse 60   Ht 1.854 m (6' 1\")   Wt 94.8 kg (209 lb)   BMI 27.57 kg/m      Constitutional:  cooperative, alert and oriented, well developed, well nourished, in no acute distress overweight      Skin:  warm and dry to the touch, no apparent skin lesions or masses noted          Head:  normocephalic, no masses or lesions        Eyes:  pupils equal and round, conjunctivae and lids unremarkable, sclera white, no xanthalasma, EOMS intact, no nystagmus        Lymph:No Cervical lymphadenopathy present     ENT:  no pallor or cyanosis, dentition good    "     Neck:  carotid pulses are full and equal bilaterally, JVP normal, no carotid bruit        Respiratory:  normal breath sounds, clear to auscultation, normal A-P diameter, normal symmetry, normal respiratory excursion, no use of accessory muscles         Cardiac: regular rhythm;normal S1 and S2;apical impulse not displaced;no murmurs, gallops or rubs detected   S4            pulses full and equal, no bruits auscultated                                        GI:  not assessed this visit        Extremities and Muscular Skeletal:  no deformities, clubbing, cyanosis, erythema observed;no edema              Neurological:  no gross motor deficits;affect appropriate        Psych:  Alert and Oriented x 3        CC  Willie Nam MD  [address] [place] [address]

## 2020-09-30 NOTE — PROGRESS NOTES
Service Date: 09/30/2020      CARDIOLOGY FOLLOWUP VISIT       REFERRING PHYSICIAN:  Willie Nam MD        HISTORY OF PRESENT ILLNESS:  It is my pleasure to see your patient Peter Hernandez, who is a 70-year-old gentleman with a past history of coronary artery disease.  In 2012, he had stenting of the right coronary artery.  The circumflex had a 40% stenosis and the LAD a 20% stenosis.  Last assessment of myocardial ischemia was in 2015 with a stress echocardiogram, which showed no evidence of ischemia.      Since I last saw him, he has been doing well.  He has no chest pains, no chest pressure and no unusual shortness of breath.  His blood pressure is well controlled at 132/76.  His pulse is 60 beats per minute.  He has lost a significant amount of weight, and his BMI is now 27, and he weighs 209 pounds.  His lipid profile, which was drawn in June, was excellent with an LDL of 64, HDL of 55 and triglycerides of 80 with a total cholesterol of 135.  His renal function at that stage showed a creatinine of 1.07, BUN of 23, potassium of 3.8 and sodium of 137.  His GFR on 07/01 was 71, which is normal.  If you remember, this patient did have mild renal insufficiency in the past, possibly related to nonsteroidal anti-inflammatory drugs.      IMPRESSION:   1.  Coronary artery disease.  The patient is asymptomatic with respect to coronary artery disease with no symptoms suggestive of angina pectoris.   2.  Excellent lipid profile, well within secondary prevention guidelines.   3.  Essential hypertension.  Excellent blood pressure control.      PLAN:  We will continue the patient on his present medications.  We will see the patient back again in 1 year's time, but as always, he has been told to contact us if he has any questions or any concerns.      cc:   Willie Nam MD   [facility]    [address] [place] [address]         MONY FREEMAN MD, FACC             D: 09/30/2020   T:  2020   MT: carrillo      Name:     NATALY CONTRERAS   MRN:      [id number]        Account:      [id number]   :      1950           Service Date: 2020      Document: [id number]

## 2020-10-08 ENCOUNTER — OFFICE VISIT (OUTPATIENT)
Dept: FAMILY MEDICINE | Facility: CLINIC | Age: 70
End: 2020-10-08

## 2020-10-08 VITALS
RESPIRATION RATE: 16 BRPM | OXYGEN SATURATION: 95 % | TEMPERATURE: 98.3 F | BODY MASS INDEX: 27.7 KG/M2 | DIASTOLIC BLOOD PRESSURE: 67 MMHG | HEART RATE: 81 BPM | HEIGHT: 73 IN | WEIGHT: 209 LBS | SYSTOLIC BLOOD PRESSURE: 137 MMHG

## 2020-10-08 DIAGNOSIS — I10 BENIGN ESSENTIAL HYPERTENSION: ICD-10-CM

## 2020-10-08 DIAGNOSIS — E78.00 HYPERCHOLESTEREMIA: ICD-10-CM

## 2020-10-08 DIAGNOSIS — Z76.0 ENCOUNTER FOR MEDICATION REFILL: ICD-10-CM

## 2020-10-08 DIAGNOSIS — E78.00 PURE HYPERCHOLESTEROLEMIA: ICD-10-CM

## 2020-10-08 DIAGNOSIS — I10 ESSENTIAL HYPERTENSION: ICD-10-CM

## 2020-10-08 DIAGNOSIS — Z00.00 ENCOUNTER FOR MEDICARE ANNUAL WELLNESS EXAM: Primary | ICD-10-CM

## 2020-10-08 DIAGNOSIS — K21.00 GASTROESOPHAGEAL REFLUX DISEASE WITH ESOPHAGITIS, UNSPECIFIED WHETHER HEMORRHAGE: ICD-10-CM

## 2020-10-08 DIAGNOSIS — N18.31 STAGE 3A CHRONIC KIDNEY DISEASE (H): ICD-10-CM

## 2020-10-08 PROCEDURE — G0439 PPPS, SUBSEQ VISIT: HCPCS | Performed by: FAMILY MEDICINE

## 2020-10-08 PROCEDURE — 99214 OFFICE O/P EST MOD 30 MIN: CPT | Mod: 25 | Performed by: FAMILY MEDICINE

## 2020-10-08 PROCEDURE — 93050 ART PRESSURE WAVEFORM ANALYS: CPT | Performed by: FAMILY MEDICINE

## 2020-10-08 RX ORDER — HYDROCHLOROTHIAZIDE 12.5 MG/1
12.5 CAPSULE ORAL DAILY
Qty: 90 CAPSULE | Refills: 3 | Status: SHIPPED | OUTPATIENT
Start: 2020-10-08 | End: 2021-09-13

## 2020-10-08 RX ORDER — LISINOPRIL 10 MG/1
TABLET ORAL
Qty: 90 TABLET | Refills: 3 | Status: SHIPPED | OUTPATIENT
Start: 2020-10-08 | End: 2021-09-13

## 2020-10-08 RX ORDER — AMLODIPINE BESYLATE 5 MG/1
5 TABLET ORAL DAILY
Qty: 90 TABLET | Refills: 3 | Status: SHIPPED | OUTPATIENT
Start: 2020-10-08 | End: 2021-09-13

## 2020-10-08 RX ORDER — ROSUVASTATIN CALCIUM 20 MG/1
20 TABLET, COATED ORAL DAILY
Qty: 90 TABLET | Refills: 3 | Status: SHIPPED | OUTPATIENT
Start: 2020-10-08 | End: 2021-09-13

## 2020-10-08 RX ORDER — ALBUTEROL SULFATE 90 UG/1
2 AEROSOL, METERED RESPIRATORY (INHALATION) EVERY 6 HOURS PRN
Qty: 3 INHALER | Refills: 3 | Status: SHIPPED | OUTPATIENT
Start: 2020-10-08

## 2020-10-08 ASSESSMENT — ASTHMA QUESTIONNAIRES
QUESTION_4 LAST FOUR WEEKS HOW OFTEN HAVE YOU USED YOUR RESCUE INHALER OR NEBULIZER MEDICATION (SUCH AS ALBUTEROL): ONE OR TWO TIMES PER DAY
QUESTION_1 LAST FOUR WEEKS HOW MUCH OF THE TIME DID YOUR ASTHMA KEEP YOU FROM GETTING AS MUCH DONE AT WORK, SCHOOL OR AT HOME: NONE OF THE TIME
QUESTION_2 LAST FOUR WEEKS HOW OFTEN HAVE YOU HAD SHORTNESS OF BREATH: MORE THAN ONCE A DAY
QUESTION_5 LAST FOUR WEEKS HOW WOULD YOU RATE YOUR ASTHMA CONTROL: WELL CONTROLLED
QUESTION_3 LAST FOUR WEEKS HOW OFTEN DID YOUR ASTHMA SYMPTOMS (WHEEZING, COUGHING, SHORTNESS OF BREATH, CHEST TIGHTNESS OR PAIN) WAKE YOU UP AT NIGHT OR EARLIER THAN USUAL IN THE MORNING: NOT AT ALL
ACT_TOTALSCORE: 17

## 2020-10-08 ASSESSMENT — MIFFLIN-ST. JEOR: SCORE: 1771.9

## 2020-10-08 NOTE — PATIENT INSTRUCTIONS
"Thank you for coming in today!   If you receive a survey via My Chart, mail or phone, please let us know if there was anything you especially appreciated today or if there is any way we can improve our clinic. We value your input.     Likewise, we are working hard to attend to our digital reputation.    Please consider reviewing our clinic on Google and/or Facebook via the following links:    [email or web address]    We truly appreciate you taking the time to do this!    General Information:    Today you had your appointment with Willie Nam MD    I am in the clinic:  Monday and Friday mornings  Tuesday and Thursday afternoons    I am not in the office Wednesdays, non urgent calls received on Wednesday will be addressed when I am back in the office on Thursday.    If lab work was done today as part of your evaluation you will generally be contacted via My Chart, mail, or phone with the results within 1-5 days. If there is an alarming result we will contact you by phone. Lab results come back at varying times, I generally wait until all labs are resulted before making comments on results. Please note labs are automatically released to My Chart once available.    If you need refills please contact your pharmacist. They will send a refill request to me to review. Please allow 3 business days for us to process all refill requests.    Please call or send a medical message with any questions or concerns.    Thank you for choosing [facility].  We truly appreciate you trusting us with your medical care.    Your next visit with us should be scheduled for Follow up in 6 months.     Listed next are the medical health Maintenance items we are tracking for your care and when they are next due (or overdue!)  Our goal is 100% \"up to date.\" Keep this list handy to call and schedule what you are due for in the future!    Health " Maintenance Due   Topic Date Due     AORTIC ANEURYSM SCREENING (SYSTEM ASSIGNED)  04/16/2017     INFLUENZA VACCINE (1) 09/01/2020     FALL RISK ASSESSMENT  09/23/2020       Sincerely,    Willie Nam MD    Patient Education   Personalized Prevention Plan  You are due for the preventive services outlined below.  Your care team is available to assist you in scheduling these services.  If you have already completed any of these items, please share that information with your care team to update in your medical record.  Health Maintenance Due   Topic Date Due     AORTIC ANEURYSM SCREENING (SYSTEM ASSIGNED)  04/16/2017     Flu Vaccine (1) 09/01/2020     FALL RISK ASSESSMENT  09/23/2020

## 2020-10-08 NOTE — PROGRESS NOTES
"  SUBJECTIVE:   Peter Hernandez II is a 70 year old male who presents for Preventive Visit.    HEARING FREQUENCY    Right Ear:     Left Ear:       Hearing Acuity: Pass    Hearing Assessment: normal    Patient has been advised of split billing requirements and indicates understanding:   Are you in the first 12 months of your Medicare Part B coverage?  No    Physical Health:    In general, how would you rate your overall physical health? good    Outside of work, how many days during the week do you exercise? 4-5 days/week    Outside of work, approximately how many minutes a day do you exercise?less than 15 minutes    If you drink alcohol do you typically have >3 drinks per day or >7 drinks per week? No    Do you usually eat at least 4 servings of fruit and vegetables a day, include whole grains & fiber and avoid regularly eating high fat or \"junk\" foods? Yes    Do you have any problems taking medications regularly?  No    Do you have any side effects from medications? none    Needs assistance for the following daily activities: no assistance needed    Which of the following safety concerns are present in your home?  none identified     Hearing impairment: No    In the past 6 months, have you been bothered by leaking of urine? no    Mental Health:    In general, how would you rate your overall mental or emotional health? excellent  PHQ-2 Score:      Do you feel safe in your environment? Yes    Have you ever done Advance Care Planning? (For example, a Health Directive, POLST, or a discussion with a medical provider or your loved ones about your wishes): Yes, advance care planning is on file.    Additional concerns to address?  No    Fall risk:  Fallen 2 or more times in the past year?: No  Any fall with injury in the past year?: No  Cognitive Screenin) Repeat 3 items (Leader, Season, Table)    2) Clock draw: NORMAL  3) 3 item recall: Recalls 3 objects  Results: 3 items recalled: COGNITIVE IMPAIRMENT LESS " LIKELY    Mini-CogTM Copyright TYLER Ashley. Licensed by the author for use in [facility]; reprinted with permission ([email or web address]). All rights reserved.      Do you have sleep apnea, excessive snoring or daytime drowsiness?: no            Reviewed and updated as needed this visit by clinical staff  Tobacco  Allergies  Meds              Reviewed and updated as needed this visit by Provider                Social History     Tobacco Use     Smoking status: Never Smoker     Smokeless tobacco: Never Used   Substance Use Topics     Alcohol use: Yes     Alcohol/week: 0.8 standard drinks     Types: 1 Standard drinks or equivalent per week     Comment: occasionally                           Current providers sharing in care for this patient include:   Patient Care Team:  Willie Nam MD as PCP - General (Family Practice)  Braulio Blanca MD as MD (Cardiology)  Willie Nam MD as Assigned PCP    The following health maintenance items are reviewed in Epic and correct as of today:  Health Maintenance   Topic Date Due     AORTIC ANEURYSM SCREENING (SYSTEM ASSIGNED)  04/16/2017     INFLUENZA VACCINE (1) 09/01/2020     FALL RISK ASSESSMENT  09/23/2020     ASTHMA CONTROL TEST  12/19/2020     LIPID  06/19/2021     ASTHMA ACTION PLAN  06/19/2021     COLORECTAL CANCER SCREENING  07/18/2021     MEDICARE ANNUAL WELLNESS VISIT  10/08/2021     DTAP/TDAP/TD IMMUNIZATION (4 - Td) 10/24/2023     ADVANCE CARE PLANNING  10/08/2025     HEPATITIS C SCREENING  Completed     PHQ-2  Completed     Pneumococcal Vaccine: 65+ Years  Completed     ZOSTER IMMUNIZATION  Completed     Pneumococcal Vaccine: Pediatrics (0 to 5 Years) and At-Risk Patients (6 to 64 Years)  Aged Out     IPV IMMUNIZATION  Aged Out     MENINGITIS IMMUNIZATION  Aged Out     HEPATITIS B IMMUNIZATION  Aged Out     Patient Active Problem List   Diagnosis     Hypercholesteremia     CAD (coronary artery disease)     ACP (advance care planning)  "    Esophageal reflux     Health Care Home     Family history of ischemic heart disease     Asthma     Benign essential hypertension     CKD (chronic kidney disease) stage 3, GFR 30-59 ml/min     Past Surgical History:   Procedure Laterality Date     HEART CATH, ANGIOPLASTY  10/4/02    3.0 X 8 mm Velocity stent     ROTATOR CUFF REPAIR RT/LT  2009    Dr. Butler       Social History     Tobacco Use     Smoking status: Never Smoker     Smokeless tobacco: Never Used   Substance Use Topics     Alcohol use: Yes     Alcohol/week: 0.8 standard drinks     Types: 1 Standard drinks or equivalent per week     Comment: occasionally     Family History   Problem Relation Age of Onset     C.A.D. Father      Cancer Father         bone     Cerebrovascular Disease Mother      Coronary Artery Disease Brother      Heart Disease Brother              ROS:  Constitutional, HEENT, cardiovascular, pulmonary, GI, , musculoskeletal, neuro, skin, endocrine and psych systems are negative, except as otherwise noted.    OBJECTIVE:   /67   Pulse 81   Temp 98.3  F (36.8  C)   Resp 16   Ht 1.854 m (6' 1\")   Wt 94.8 kg (209 lb)   SpO2 95%   BMI 27.57 kg/m   Estimated body mass index is 27.57 kg/m  as calculated from the following:    Height as of this encounter: 1.854 m (6' 1\").    Weight as of this encounter: 94.8 kg (209 lb).  EXAM:   GENERAL: healthy, alert and no distress  EYES: Eyes grossly normal to inspection, PERRL and conjunctivae and sclerae normal  HENT: ear canals and TM's normal, nose and mouth without ulcers or lesions  NECK: no adenopathy, no asymmetry, masses, or scars and thyroid normal to palpation  RESP: lungs clear to auscultation - no rales, rhonchi or wheezes  CV: regular rate and rhythm, normal S1 S2, no S3 or S4, no murmur, click or rub, no peripheral edema and peripheral pulses strong  ABDOMEN: soft, nontender, no hepatosplenomegaly, no masses and bowel sounds normal  RECTAL: normal sphincter tone, no rectal " masses, prostate normal size, smooth, nontender without nodules or masses  MS: decreased rom in the right hip  SKIN: no suspicious lesions or rashes  NEURO: Normal strength and tone, mentation intact and speech normal  PSYCH: mentation appears normal, affect normal/bright    Diagnostic Test Results:  Labs reviewed in Epic    ASSESSMENT / PLAN:   Peter was seen today for physical and back pain.    Diagnoses and all orders for this visit:    Encounter for Medicare annual wellness exam  -     ABO AND RH BLOOD TYPING DOCUMENTED AS PERFORMED    Benign essential hypertension  Discussed current hypertension treatment guidelines, including indications for treatment and treatment options.  Discussed the importance for aggressive management of HTN to prevent vascular complications later.  Recommended lower fat, lower carbohydrate, and lower sodium (<2000 mg)diet.  Discussed required intervals for follow up on HTN, lab studies.  Recommened pt. follow their blood pressures outside the clinic to ensure that BPs are remaining within guidelines, and to contact me if the readings are not within guidelines on a regular basis so we can adjust treatment as needed.    -     MO ART PRESS WAVEFORM ANALYS CENTRAL ART PRESSURE  -     amLODIPine (NORVASC) 5 MG tablet; Take 1 tablet (5 mg) by mouth daily  -     hydrochlorothiazide (MICROZIDE) 12.5 MG capsule; Take 1 capsule (12.5 mg) by mouth daily  -     lisinopril (ZESTRIL) 10 MG tablet; TAKE 1 TABLET BY MOUTH ONCE DAILY.    Gastroesophageal reflux disease with esophagitis, unspecified whether hemorrhage  Discussed the functional nature of this condition regarding what they eat, how they eat, when they eat, and how much they eat as all contributing to gastroesophageal symptoms.    Recommend anti-reflux diet and eating patterns emphasizing smaller more frequent meals and timing relative to bedtime.  Also recommend avoiding tomatoes, onions, spicy foods, caffeine, or any other food/beverage  "that cause increased reflux.    If symptoms not controlled on current therapies, then need to return for further evaluation and consideration of further studies.    Hypercholesteremia  Discussed current lipid results, previous results (if available) current guidelines (NCEP) for treatment and goals for lipids.  Discussed lifestyle modification, dietary changes (low fat, low simple carb) and regular aerobic exercise.  Discussed the link between dysmetabolic syndrome and impaired glucose tolerance seen in certain patterns of lipids.  Briefly discussed medication used for lipid lowering, including the statins are their possible side effects of myalgias, rhabdomyolysis, and liver toxicity.    -     rosuvastatin (CRESTOR) 20 MG tablet; Take 1 tablet (20 mg) by mouth daily    Stage 3a chronic kidney disease    Encounter for medication refill  -     albuterol (VENTOLIN HFA) 108 (90 Base) MCG/ACT inhaler; Inhale 2 puffs into the lungs every 6 hours as needed for shortness of breath / dyspnea or wheezing        Patient has been advised of split billing requirements and indicates understanding: Yes    COUNSELING:  Reviewed preventive health counseling, as reflected in patient instructions       Regular exercise       Healthy diet/nutrition       Vision screening    Estimated body mass index is 27.57 kg/m  as calculated from the following:    Height as of this encounter: 1.854 m (6' 1\").    Weight as of this encounter: 94.8 kg (209 lb).        He reports that he has never smoked. He has never used smokeless tobacco.    Appropriate preventive services were discussed with this patient, including applicable screening as appropriate for cardiovascular disease, diabetes, osteopenia/osteoporosis, and glaucoma.  As appropriate for age/gender, discussed screening for colorectal cancer, prostate cancer, breast cancer, and cervical cancer. Checklist reviewing preventive services available has been given to the patient.    Reviewed " patients plan of care and provided an AVS. The Basic Care Plan (routine screening as documented in Health Maintenance) for Peter meets the Care Plan requirement. This Care Plan has been established and reviewed with the Patient.    Counseling Resources:  ATP IV Guidelines  Pooled Cohorts Equation Calculator  Breast Cancer Risk Calculator  BRCA-Related Cancer Risk Assessment: FHS-7 Tool  FRAX Risk Assessment  ICSI Preventive Guidelines  Dietary Guidelines for Americans, 2010  USDA's MyPlate  ASA Prophylaxis  Lung CA Screening    Willie Nam MD  [facility]

## 2020-10-09 ASSESSMENT — ASTHMA QUESTIONNAIRES: ACT_TOTALSCORE: 17

## 2020-10-28 DIAGNOSIS — M79.672 LEFT FOOT PAIN: ICD-10-CM

## 2020-10-29 RX ORDER — NAPROXEN 500 MG/1
TABLET ORAL
Qty: 40 TABLET | Refills: 1 | Status: SHIPPED | OUTPATIENT
Start: 2020-10-29 | End: 2021-01-14

## 2020-10-30 ENCOUNTER — OFFICE VISIT (OUTPATIENT)
Dept: FAMILY MEDICINE | Facility: CLINIC | Age: 70
End: 2020-10-30

## 2020-10-30 VITALS
HEART RATE: 58 BPM | SYSTOLIC BLOOD PRESSURE: 130 MMHG | WEIGHT: 207 LBS | DIASTOLIC BLOOD PRESSURE: 74 MMHG | BODY MASS INDEX: 27.31 KG/M2 | TEMPERATURE: 97 F | OXYGEN SATURATION: 97 %

## 2020-10-30 DIAGNOSIS — M54.50 ACUTE BILATERAL LOW BACK PAIN WITHOUT SCIATICA: Primary | ICD-10-CM

## 2020-10-30 DIAGNOSIS — I10 BENIGN ESSENTIAL HYPERTENSION: ICD-10-CM

## 2020-10-30 PROCEDURE — 99213 OFFICE O/P EST LOW 20 MIN: CPT | Performed by: FAMILY MEDICINE

## 2020-10-30 PROCEDURE — 93050 ART PRESSURE WAVEFORM ANALYS: CPT | Performed by: FAMILY MEDICINE

## 2020-10-30 RX ORDER — CYCLOBENZAPRINE HCL 10 MG
10 TABLET ORAL 3 TIMES DAILY PRN
Qty: 30 TABLET | Refills: 0 | Status: SHIPPED | OUTPATIENT
Start: 2020-10-30 | End: 2021-02-22

## 2020-10-30 NOTE — PROGRESS NOTES
SUBJECTIVE: Peter Hernandez II is a 70 year old male with a few days of low back spasm.  Has history of sciatic but is doing PT.  Now mostly bothered by low back spasms.  Denies trauma, fever, urinary incontinence of retention, stool changes or saddle anesthesia.         Past Medical History:   Past Medical History:   Diagnosis Date     ACP (advance care planning)     will bring copy in      Bruit      CAD (coronary artery disease) 2002    a stent     Esophageal reflux 5/17/2013     Family history of ischemic heart disease      Hyperlipidaemia      Hypertension        Medications:    Multiple Vitamins-Minerals, Probiotic Product, Vitamin D (Cholecalciferol), albuterol, amLODIPine, aspirin, beclomethasone HFA, cyclobenzaprine, hydrochlorothiazide, ketoconazole, lisinopril, naproxen, nitroGLYcerin, rosuvastatin, tadalafil, and tetrahydrozoline    Social history:   Social History     Socioeconomic History     Marital status:      Spouse name: None     Number of children: None     Years of education: None     Highest education level: None   Occupational History     Occupation: Finance   Social Needs     Financial resource strain: None     Food insecurity     Worry: None     Inability: None     Transportation needs     Medical: None     Non-medical: None   Tobacco Use     Smoking status: Never Smoker     Smokeless tobacco: Never Used   Substance and Sexual Activity     Alcohol use: Yes     Alcohol/week: 0.8 standard drinks     Types: 1 Standard drinks or equivalent per week     Comment: occasionally     Drug use: No     Sexual activity: Yes     Partners: Female   Lifestyle     Physical activity     Days per week: None     Minutes per session: None     Stress: None   Relationships     Social connections     Talks on phone: None     Gets together: None     Attends Evangelical service: None     Active member of club or organization: None     Attends meetings of clubs or organizations: None     Relationship status:  None     Intimate partner violence     Fear of current or ex partner: None     Emotionally abused: None     Physically abused: None     Forced sexual activity: None   Other Topics Concern     Parent/sibling w/ CABG, MI or angioplasty before 65F 55M? No      Service Not Asked     Blood Transfusions Not Asked     Caffeine Concern Not Asked     Occupational Exposure Not Asked     Hobby Hazards Not Asked     Sleep Concern Yes     Comment: occ     Stress Concern No     Weight Concern Not Asked     Special Diet Not Asked     Back Care Not Asked     Exercise Yes     Comment: walks daily     Bike Helmet Not Asked     Seat Belt Not Asked     Self-Exams Not Asked   Social History Narrative     None       Review of Systems:   A 10 system review was completed and is as noted in HPI and otherwise negative.    Adverse Drug Reactions: Cats and Simvastatin    OBJECTIVE:      Vital Signs:  /74   Pulse 58   Temp 97  F (36.1  C)   Wt 93.9 kg (207 lb)   SpO2 97%   BMI 27.31 kg/m      General:  Uncomfortable, NAD   Skin:  No bruising or rash  Musculoskeletal:   Gait is mildly antalgic  Sensory exam in the legs is normal.   Knee reflexes are normal and symmetric.  Ankle reflexes are normal and symmetric  Strength is normal and symmetric.  Bilateral paraspinal muscle spasm.  There is no midline tenderness.  ROM of spine limited in flexion, extension, lateral range of motion to the right and left, and rotation to the right and left due to pain  Straight leg raising negative bilaterally for radicular symptoms    ASSESSMENT/PLAN:     1. Acute bilateral low back pain without sciatica    2. Benign essential hypertension         We discussed that symptoms appear mechanical/muscular in nature. discussed supportive and symptomatic cares in detail.  Handout given.  Brief rest if needed and maintain gentle activity and mobility as soon as able to.  Stretching, heat, NSAIDs, muscle relaxants as needed.  Potential side effects  discussed in detail.  Anticipatory guidance given.  Imaging not indicated at this time. Follow up as needed.

## 2020-10-30 NOTE — PATIENT INSTRUCTIONS
Patient Education     Back Spasm (No Trauma)    Spasm of the back muscles can occur after a sudden forceful twisting or bending such as in a car accident. A spasm can also happen after a simple awkward movement, or after lifting something heavy with poor body positioning. In any case, muscle spasm adds to the pain. Sleeping in an awkward position or on a poor quality mattress can also cause this. Some people respond to emotional stress by tensing the muscles of their back.  Pain that continues may need further assessment or other types of treatment such as physical therapy.  You don't always need X-rays for the first assessment of back pain, unless you had a physical injury such as from a car accident or fall. If your pain continues and doesn't respond to medical treatment, X-rays and other tests may then be done.   Home care    As soon as possible, start sitting or walking again. This will help prevent problems from a long bed rest. These problems include muscle weakness, worsening back stiffness and pain, and blood clots in the legs.    When in bed, try to find a position of comfort. A firm mattress is best. Try lying flat on your back with pillows under your knees. You can also try lying on your side with your knees bent up toward your chest and a pillow between your knees.    Don't sit for long periods. Also limit car rides and travel. This puts more stress on the lower back than standing or walking.     During the first 24 to 72 hours after an injury or flare-up, put an ice pack on the painful area for 20 minutes, then remove it for 20 minutes. Do this over a period of 60 to 90 minutes, or several times a day. This will reduce swelling and pain. Always wrap ice packs in a thin towel.    You can start with ice, then switch to heat. Heat from a hot shower, hot bath, or heating pad reduces pain and works well for muscle spasms. Put heat on the painful area for 20 minutes, then remove it for 20 minutes. Do this  over a period of 60 to 90 minutes, or several times a day. Don't sleep on a heating pad. It can burn or damage skin.    Alternate using ice and heat.    Be aware of safe lifting methods. don't lift anything over 15 pounds until all the pain is gone.  Gentle stretching will help your back heal faster. Do this simple routine 2 to 3 times a day until your back is feeling better.    Lie on your back with your knees bent and both feet on the ground.    Slowly raise your left knee to your chest as you flatten your lower back against the floor. Hold for 20 to 30 seconds.    Relax and repeat the exercise with your right knee.    Do 2 to 3 of these exercises for each leg.    Repeat, hugging both knees to your chest at the same time.    Don't bounce, but use a gentle pull.  Medicines  Talk with your doctor before using medicine, especially if you have other medical problems or are taking other medicines.  You may use over-the-counter medicines such as acetaminophen, ibuprofen, or naprosyn to control pain, unless your healthcare provider prescribed another pain medicine. Talk with your healthcare provider if you have a chronic condition such as diabetes, liver or kidney disease, stomach ulcer, or digestive bleeding, or are taking blood thinners.  Be careful if you are given prescription pain medicine, opioids, or medicine for muscle spasm. They can cause drowsiness, and affect your coordination, reflexes, and judgment. Don't drive or operate heavy machinery when taking these medicines. Take pain medicine only as prescribed by your healthcare provider.  Follow-up care  Follow up with your doctor, or as advised. You may need physical therapy or more tests.  If X-rays were taken, they may be reviewed by a radiologist. You will be told of any new findings that may affect your care.  Call   Call if any of these occur:    Trouble breathing    Confusion    Drowsiness or trouble awakening    Fainting or loss of consciousness    Rapid  or very slow heart rate    Loss of bowel or bladder control  When to seek medical advice  Call your healthcare provider right away if any of these occur:    Pain becomes worse or spreads to your legs    Weakness or numbness in one or both legs    Numbness in the groin or genital area    Fever of 100.4 F (38 C) or higher , or as directed by your healthcare provider    Chills    Burning or pain when passing urine  Marlene last reviewed this educational content on 11/1/2018 2000-2020 [employer]. [address] [place] [address]. All rights reserved. This information is not intended as a substitute for professional medical care. Always follow your healthcare professional's instructions.

## 2020-11-16 ENCOUNTER — OFFICE VISIT (OUTPATIENT)
Dept: FAMILY MEDICINE | Facility: CLINIC | Age: 70
End: 2020-11-16

## 2020-11-16 VITALS
SYSTOLIC BLOOD PRESSURE: 172 MMHG | WEIGHT: 206 LBS | HEART RATE: 57 BPM | DIASTOLIC BLOOD PRESSURE: 76 MMHG | OXYGEN SATURATION: 98 % | RESPIRATION RATE: 16 BRPM | TEMPERATURE: 97.8 F | BODY MASS INDEX: 27.18 KG/M2

## 2020-11-16 DIAGNOSIS — I10 BENIGN ESSENTIAL HYPERTENSION: Primary | ICD-10-CM

## 2020-11-16 PROCEDURE — 99213 OFFICE O/P EST LOW 20 MIN: CPT | Performed by: FAMILY MEDICINE

## 2020-11-16 PROCEDURE — 93050 ART PRESSURE WAVEFORM ANALYS: CPT | Performed by: FAMILY MEDICINE

## 2020-11-16 NOTE — PATIENT INSTRUCTIONS
This is the website for free testing for COVID from the [facility] Department of Health:    [email or web address]    More information is also available at the [facility] website:    [email or web address]

## 2020-11-16 NOTE — PROGRESS NOTES
He complains of low back pain for a long while, positional with bending or lifting, with radiation down the legs. Precipitating factors: none recalled by the patient. Prior history of back problems: recurrent self limited episodes of low back pain in the past. There is no numbness or weakness in the legs.    Really improving with two different therapists.    Blood pressure has been great in the  Past weeks.    Problem(s) Oriented visit      ROS:  General and Resp. completed and negative except as noted above     HISTORY:   reports current alcohol use of about 0.8 standard drinks of alcohol per week.   reports that he has never smoked. He has never used smokeless tobacco.    Past Medical History:   Diagnosis Date     ACP (advance care planning)      Bruit      CAD (coronary artery disease) 2002     Esophageal reflux 5/17/2013     Family history of ischemic heart disease      Hyperlipidaemia      Hypertension      Past Surgical History:   Procedure Laterality Date     HEART CATH, ANGIOPLASTY  10/4/02    3.0 X 8 mm Velocity stent     ROTATOR CUFF REPAIR RT/LT  2009    Dr. Butler       EXAM:  BP: 172/76[AtCor[   Pulse: 57    Temp: 97.8    Wt Readings from Last 2 Encounters:   11/16/20 93.4 kg (206 lb)   10/30/20 93.9 kg (207 lb)       BMI= Body mass index is 27.18 kg/m .    EXAM:  APPEARANCE: = Relaxed and in no distress  Conj/Eyelids = noninjected and lids and lashes are without inflammation  PERRLA/Irises = Pupils Round Reactive to Light and Irisis without inflammation  Neck = No anterior or posterior adenopathy appreciated.  Thyroid = Not enlarged and no masses felt  Resp effort = Calm regular breathing  Breath Sounds = Good air movement with no rales or rhonchi in any lung fields  Heart Rate, Rythym, & sounds (no Murm)  = Regular rate and rythym with no S3, S4, or murmer appreciated.  Carotid Art's = Pulses full and equal and no bruits appreciated  Abdomen = Soft, nontender, no masses, & bowel sounds in all  "quadrants  Liver/Spleen = Normal span and no splenomegaly noted  Digits and Nails = FROM in all finger joints, no nail dystrophy  Ext (edema) = No pretibial edema noted or elsewhere  Musculsktl =  Strength and ROM of major joints are within normal limits  SKIN = absent significant rashes or lesions   Recent/Remote Memory = Alert and Oriented x 3  Mood/Affect = Cooperative and interested      Assessment/Plan:  Peter was seen today for back pain.    Diagnoses and all orders for this visit:    Benign essential hypertension  -     [place] ART PRESS WAVEFORM ANALYS CENTRAL ART PRESSURE        COUNSELING:   reports that he has never smoked. He has never used smokeless tobacco.    Estimated body mass index is 27.18 kg/m  as calculated from the following:    Height as of 10/8/20: 1.854 m (6' 1\").    Weight as of this encounter: 93.4 kg (206 lb).       Appropriate preventive services were discussed with this patient, including applicable screening as appropriate for cardiovascular disease, diabetes, osteopenia/osteoporosis, and glaucoma.  As appropriate for age/gender, discussed screening for colorectal cancer, prostate cancer, breast cancer, and cervical cancer. Checklist reviewing preventive services available has been given to the patient.    Reviewed patients plan of care and provided an AVS. The Basic Care Plan (routine screening as documented in Health Maintenance) for Peter meets the Care Plan requirement. This Care Plan has been established and reviewed with the  Patient.      The following health maintenance items are reviewed in Epic and correct as of today:  Health Maintenance   Topic Date Due     AORTIC ANEURYSM SCREENING (SYSTEM ASSIGNED)  04/16/2017     ASTHMA CONTROL TEST  04/08/2021     LIPID  06/19/2021     ASTHMA ACTION PLAN  06/19/2021     COLORECTAL CANCER SCREENING  07/18/2021     MEDICARE ANNUAL WELLNESS VISIT  10/08/2021     FALL RISK ASSESSMENT  10/08/2021     DTAP/TDAP/TD IMMUNIZATION (4 - Td) " 10/24/2023     ADVANCE CARE PLANNING  10/08/2025     HEPATITIS C SCREENING  Completed     PHQ-2  Completed     INFLUENZA VACCINE  Completed     Pneumococcal Vaccine: 65+ Years  Completed     ZOSTER IMMUNIZATION  Completed     Pneumococcal Vaccine: Pediatrics (0 to 5 Years) and At-Risk Patients (6 to 64 Years)  Aged Out     IPV IMMUNIZATION  Aged Out     MENINGITIS IMMUNIZATION  Aged Out     HEPATITIS B IMMUNIZATION  Aged Out       Willie Nam  [facility]  For any issues my office # is [phone]

## 2021-01-14 DIAGNOSIS — M79.672 LEFT FOOT PAIN: ICD-10-CM

## 2021-01-14 RX ORDER — NAPROXEN 500 MG/1
TABLET ORAL
Qty: 40 TABLET | Refills: 1 | Status: SHIPPED | OUTPATIENT
Start: 2021-01-14 | End: 2021-04-27

## 2021-02-05 ENCOUNTER — TELEPHONE (OUTPATIENT)
Dept: CARDIOLOGY | Facility: CLINIC | Age: 71
End: 2021-02-05

## 2021-02-05 DIAGNOSIS — I25.10 CORONARY ARTERY DISEASE INVOLVING NATIVE CORONARY ARTERY OF NATIVE HEART WITHOUT ANGINA PECTORIS: Primary | ICD-10-CM

## 2021-02-05 NOTE — TELEPHONE ENCOUNTER
Received call from pt stating that he is scheduled for hip surgery 2/23/21 & will see Dr. Nam for pre op physical. Pt asking if he can stop ASA prior to surgery. Will message Dr. Blanca to review. He will check if he needs cardiology clearance & call back. Chuy BROWNE

## 2021-02-08 DIAGNOSIS — Z11.59 ENCOUNTER FOR SCREENING FOR OTHER VIRAL DISEASES: ICD-10-CM

## 2021-02-08 NOTE — TELEPHONE ENCOUNTER
Patient returned call and left  confirming he did receive PAVAN Luther's message and confirmed he can make apt on 2/17/21 at 9:15 am with Dr. Blanca in [place].

## 2021-02-11 ENCOUNTER — OFFICE VISIT (OUTPATIENT)
Dept: FAMILY MEDICINE | Facility: CLINIC | Age: 71
End: 2021-02-11

## 2021-02-11 VITALS
TEMPERATURE: 97.8 F | DIASTOLIC BLOOD PRESSURE: 75 MMHG | WEIGHT: 214 LBS | BODY MASS INDEX: 28.36 KG/M2 | SYSTOLIC BLOOD PRESSURE: 153 MMHG | RESPIRATION RATE: 16 BRPM | HEIGHT: 73 IN | HEART RATE: 61 BPM

## 2021-02-11 DIAGNOSIS — M25.551 RIGHT HIP PAIN: ICD-10-CM

## 2021-02-11 DIAGNOSIS — I25.10 CORONARY ARTERY DISEASE INVOLVING NATIVE CORONARY ARTERY OF NATIVE HEART WITHOUT ANGINA PECTORIS: ICD-10-CM

## 2021-02-11 DIAGNOSIS — N18.31 STAGE 3A CHRONIC KIDNEY DISEASE (H): ICD-10-CM

## 2021-02-11 DIAGNOSIS — E78.00 HYPERCHOLESTEREMIA: ICD-10-CM

## 2021-02-11 DIAGNOSIS — Z01.818 PREOP GENERAL PHYSICAL EXAM: Primary | ICD-10-CM

## 2021-02-11 DIAGNOSIS — K21.00 GASTROESOPHAGEAL REFLUX DISEASE WITH ESOPHAGITIS, UNSPECIFIED WHETHER HEMORRHAGE: ICD-10-CM

## 2021-02-11 DIAGNOSIS — I10 BENIGN ESSENTIAL HYPERTENSION: ICD-10-CM

## 2021-02-11 LAB
% GRANULOCYTES: 62.9 % (ref 42.2–75.2)
HCT VFR BLD AUTO: 44.8 % (ref 39–51)
HEMOGLOBIN: 14.5 G/DL (ref 13.4–17.5)
LYMPHOCYTES NFR BLD AUTO: 28.4 % (ref 20.5–51.1)
MCH RBC QN AUTO: 30 PG (ref 27–31)
MCHC RBC AUTO-ENTMCNC: 32.5 G/DL (ref 33–37)
MCV RBC AUTO: 92.5 FL (ref 80–100)
MONOCYTES NFR BLD AUTO: 8.37 % (ref 1.7–9.3)
PLATELET # BLD AUTO: 232 K/UL (ref 140–450)
RBC # BLD AUTO: 4.84 X10/CMM (ref 4.2–5.9)
WBC # BLD AUTO: 12.2 X10/CMM (ref 3.8–11)

## 2021-02-11 PROCEDURE — 99215 OFFICE O/P EST HI 40 MIN: CPT | Performed by: FAMILY MEDICINE

## 2021-02-11 PROCEDURE — 93000 ELECTROCARDIOGRAM COMPLETE: CPT | Mod: 59 | Performed by: FAMILY MEDICINE

## 2021-02-11 PROCEDURE — 93050 ART PRESSURE WAVEFORM ANALYS: CPT | Performed by: FAMILY MEDICINE

## 2021-02-11 PROCEDURE — 36415 COLL VENOUS BLD VENIPUNCTURE: CPT | Performed by: FAMILY MEDICINE

## 2021-02-11 PROCEDURE — 85025 COMPLETE CBC W/AUTO DIFF WBC: CPT | Performed by: FAMILY MEDICINE

## 2021-02-11 RX ORDER — PSYLLIUM HUSK 3.4 G/5.8G
POWDER ORAL
COMMUNITY
End: 2021-02-22

## 2021-02-11 RX ORDER — MULTIVIT-MIN/IRON/FOLIC ACID/K 18-600-40
500 CAPSULE ORAL 2 TIMES DAILY WITH MEALS
COMMUNITY

## 2021-02-11 RX ORDER — ZINC GLUCONATE 50 MG
50 TABLET ORAL EVERY MORNING
COMMUNITY

## 2021-02-11 ASSESSMENT — MIFFLIN-ST. JEOR: SCORE: 1794.58

## 2021-02-11 NOTE — PATIENT INSTRUCTIONS
Medication Instructions:  Patient is to take all scheduled medications on the day of surgery EXCEPT for modifications listed below:   - aspirin: Discontinue aspirin 7-10 days prior to procedure to reduce bleeding risk. It should be resumed postoperatively.    - ACE/ARB: May be continued on the day of surgery.    - Diuretics: HOLD on the day of surgery.   - Statins: Continue taking on the day of surgery.    - naproxen (Aleve, Naprosyn): HOLD 4 days before surgery.         Preparing for Your Surgery  Getting started  A nurse will call you to review your health history and instructions. They will give you an arrival time based on your scheduled surgery time.  Please be ready to share the following:    Your doctor's clinic name and phone number    Your medical, surgical and anesthesia history    A list of allergies and sensitivities    A list of medicines, including herbal treatments and over-the-counter drugs    Whether the patient has a legal guardian (ask how to send us the papers in advance)  If you have a child who's having surgery, please ask for a copy of Preparing for Your Child's Surgery.    Preparing for surgery    Within 30 days of surgery: Have a pre-op exam (sometimes called an H&P, or History and Physical). This can be done at a clinic or pre-operative center.  ? If you're having a , you may not need this exam. Talk to your care team    At your pre-op exam, talk to your care team about all medicines you take. If you need to stop any medicines before surgery, ask when to start taking them again.  ? We do this for your safety. Many medicines can make you bleed too much during surgery. Some change how well surgery (anesthesia) drugs work.    Call your insurance company to let them know you're having surgery. (If you don't have insurance, call [phone].)    Call your clinic if there's any change in your health. This includes signs of a cold or flu (sore throat, runny nose, cough, rash, fever). It  also includes a scrape or scratch near the surgery site.    If you have questions on the day of surgery, call your hospital or surgery center.  Eating and drinking guidelines  For your safety: Unless your surgeon tells you otherwise, follow the guidelines below.    Eat and drink as usual until 8 hours before surgery. After that, no food or milk.    Drink clear liquids until 2 hours before surgery. These are liquids you can see through, like water, Gatorade and Propel Water. You may also have black coffee and tea (no cream or milk).    Nothing by mouth within 2 hours of surgery. This includes gum, candy and breath mints.    If you drink, stop drinking alcohol the night before surgery.    If your care team tells you to take medicine on the morning of surgery, it's okay to take it with a sip of water.  Preventing infection    Shower or bathe the night before and morning of your surgery. Follow the instructions your clinic gave you. (If no instructions, use regular soap.)    Don't shave or clip hair near your surgery site. We'll remove the hair if needed.    Don't smoke or vape the morning of surgery. You may chew nicotine gum up to 2 hours before surgery. A nicotine patch is okay.  ? Note: Some surgeries require you to completely quit smoking and nicotine. Check with your surgeon.    Your care team will make every effort to keep you safe from infection. We will:  ? Clean our hands often with soap and water (or an alcohol-based hand rub).  ? Clean the skin at your surgery site with a special soap that kills germs.  ? Give you a special gown to keep you warm. (Cold raises the risk of infection.)  ? Wear special hair covers, masks, gowns and gloves during surgery.  ? Give antibiotic medicine, if prescribed. Not all surgeries need antibiotics.  What to bring on the day of surgery    Photo ID and insurance card    Copy of your health care directive, if you have one    Glasses and hearing aides (bring cases)  ? You can't  wear contacts during surgery    Inhaler and eye drops, if you use them (tell us about these when you arrive)    CPAP machine or breathing device, if you use them    A few personal items, if spending the night    If you have . . .  ? A pacemaker or ICD (cardiac defibrillator): Bring the ID card.  ? An implanted stimulator: Bring the remote control.  ? A legal guardian: Bring a copy of the certified (court-stamped) guardianship papers.  Please remove any jewelry, including body piercings. Leave jewelry and other valuables at home.  If you're going home the day of surgery  Important: If you don't follow the rules below, we must cancel your surgery.     Arrange for someone to drive you home after surgery. You may not drive, take a taxi or take public transportation by yourself (unless you'll have local anesthesia only).    Arrange for a responsible adult to stay with you overnight. If you don't, we may keep you in the hospital overnight, and you may need to pay the costs yourself.  Questions?   If you have any questions for your care team, list them here: _________________________________________________________________________________________________________________________________________________________________________________________________________________________________________________________________________________________________________________________  For informational purposes only. Not to replace the advice of your health care provider. Copyright   2003, 2019 [facility]. All rights reserved. Clinically reviewed by Deisi Goldman MD. [employer] 336667 - REV 4/20.

## 2021-02-11 NOTE — H&P (VIEW-ONLY)
[facility] [address] [place] [address]  Phone: [phone]  Fax: [phone]  Primary Provider: Mirna Nam  Pre-op Performing Provider: MIRNA NAM    PREOPERATIVE EVALUATION:  Today's date: 2/11/2021    Peter Hernandez II is a 70 year old male who presents for a preoperative evaluation.    Surgical Information:  Surgery/Procedure: ARTHROPLASTY, HIP, TOTAL, Right  Surgery Location: [facility]  Surgeon: Gera Davison MD  Surgery Date: 2/23/21  Time of Surgery: 0900  Where patient plans to recover: At home with family  Fax number for surgical facility: Note does not need to be faxed, will be available electronically in Epic.    Type of Anesthesia Anticipated: to be determined    Preoperative Questionnaire:   No - Have you ever had a heart attack or stroke?  YES - HAVE YOU EVER HAD SURGERY ON YOUR HEART OR BLOOD VESSELS, SUCH AS A STENT, CORONARY (HEART) BYPASS, OR SURGERY ON AN ARTERY IN THE HEAD, NECK, HEART, OR LEGS? Stent 2002  No - Do you have chest pain when you are physically active?  No - Do you have a history of heart failure?  No - Do you currently have a cold, bronchitis, or symptoms of other respiratory (head and chest) infections?  No - Do you have a cough, shortness of breath, or wheezing?  No - Do you or anyone in your family have a history of blood clots?  No - Do you or anyone in your family have a serious bleeding problem, such as long-lasting bleeding after surgeries or cuts?  No - Have you ever had anemia or been told to take iron pills?  No - Have you had any abnormal blood loss such as black, tarry or bloody stools, or abnormal vaginal bleeding?  No - Have you ever had a blood transfusion?  Yes - Are you willing to have a blood transfusion if it is medically needed before, during, or after your surgery?  No - Have you or anyone in your family ever had problems with anesthesia (sedation for surgery)?  No - Do you have sleep apnea, excessive  snoring, or daytime drowsiness?   No - Do you have any artifical heart valves or other implanted medical devices, such as a pacemaker, defibrillator, or continuous glucose monitor?  No - Do you have any artifical joints?  No - Are you allergic to latex?  No - Is there any chance that you may be pregnant?      Assessment & Plan     The proposed surgical procedure is considered INTERMEDIATE risk.    Problem List Items Addressed This Visit     Hypercholesteremia    Esophageal reflux    Relevant Medications    Psyllium (METAMUCIL FIBER) 51.7 % PACK    CKD (chronic kidney disease) stage 3, GFR 30-59 ml/min    CAD (coronary artery disease)    Benign essential hypertension    Relevant Orders    [place] ART PRESS WAVEFORM ANALYS CENTRAL ART PRESSURE (Completed)    VENOUS COLLECTION (Completed)      Other Visit Diagnoses     Preop general physical exam    -  Primary    Relevant Orders    EKG 12-lead complete w/read - Clinics (Completed)    Basic Metabolic Panel (8) ([facility])    VENOUS COLLECTION (Completed)    Right hip pain        Relevant Orders    EKG 12-lead complete w/read - Clinics (Completed)    Basic Metabolic Panel (8) ([facility])    CBC with Diff/Plt ([facility]) (Completed)    VENOUS COLLECTION (Completed)              Risks and Recommendations:  The patient has the following additional risks and recommendations for perioperative complications:   - No identified additional risk factors other than previously addressed    Medication Instructions:  Patient is to take all scheduled medications on the day of surgery EXCEPT for modifications listed below:   - aspirin: Discontinue aspirin 7-10 days prior to procedure to reduce bleeding risk. It should be resumed postoperatively.    - ACE/ARB: May be continued on the day of surgery.    - Diuretics: HOLD on the day of surgery.   - Statins: Continue taking on the day of surgery.    - naproxen (Aleve, Naprosyn): HOLD 4 days before surgery.     RECOMMENDATION:  APPROVAL GIVEN to proceed  with proposed procedure, without further diagnostic evaluation.                      Subjective     HPI related to upcoming procedure: Right Hip Replacement      Health Care Directive:  Patient has a Health Care Directive on file      Preoperative Review of :   reviewed - no record of controlled substances prescribed.      Status of Chronic Conditions:  See problem list for active medical problems.  Problems all longstanding and stable, except as noted/documented.  See ROS for pertinent symptoms related to these conditions.    ASTHMA - Patient has a longstanding history of moderate-severe Asthma . Patient has been doing well overall noting NO SYMPTOMS and continues on medication regimen consisting of albuterol without adverse reactions or side effects.     CAD - Patient has a longstanding history of moderate-severe CAD. Patient denies recent chest pain or NTG use, denies exercise induced dyspnea or PND. Last Stress test 2015, Is going to follow up with Cardiologist Dr. Blanca for cardiology eval prior to surgery.     HYPERTENSION - Patient has longstanding history of HTN , currently denies any symptoms referable to elevated blood pressure. Specifically denies chest pain, palpitations, dyspnea, orthopnea, PND or peripheral edema. Blood pressure readings have been in normal range. Current medication regimen is as listed below. Patient denies any side effects of medication.       Review of Systems  Constitutional, neuro, ENT, endocrine, pulmonary, cardiac, gastrointestinal, genitourinary, musculoskeletal, integument and psychiatric systems are negative, except as otherwise noted.    Patient Active Problem List    Diagnosis Date Noted     CKD (chronic kidney disease) stage 3, GFR 30-59 ml/min 09/23/2019     Priority: Medium     Benign essential hypertension 12/19/2016     Priority: Medium     Asthma 07/10/2015     Priority: Medium     Allergies to animals, needs inhaler if visiting a client with  pets.  Problem list name updated by automated process. Provider to review       Family history of ischemic heart disease      Priority: Medium     Health Care Home 10/24/2013     Priority: Medium     Esophageal reflux 05/17/2013     Priority: Medium     ACP (advance care planning)      Priority: Medium     will bring copy in        CAD (coronary artery disease) 05/16/2011     Priority: Medium     Last Stress in 7/6/2012,   History of coronary artery disease and stenting of his left anterior descending artery in 2002. His cardiac risk factors include hypertension and mixed lipidemia        Hypercholesteremia 02/27/2011     Priority: Medium      Past Medical History:   Diagnosis Date     ACP (advance care planning)     will bring copy in      Bruit      CAD (coronary artery disease) 2002    a stent     Esophageal reflux 5/17/2013     Family history of ischemic heart disease      Hyperlipidaemia      Hypertension      Past Surgical History:   Procedure Laterality Date     HEART CATH, ANGIOPLASTY  10/4/02    3.0 X 8 mm Velocity stent     ROTATOR CUFF REPAIR RT/LT  2009    Dr. Butler     Current Outpatient Medications   Medication Sig Dispense Refill     albuterol (VENTOLIN HFA) 108 (90 Base) MCG/ACT inhaler Inhale 2 puffs into the lungs every 6 hours as needed for shortness of breath / dyspnea or wheezing 3 Inhaler 3     amLODIPine (NORVASC) 5 MG tablet Take 1 tablet (5 mg) by mouth daily 90 tablet 3     Ascorbic Acid (VITAMIN C) 500 MG CAPS Take 1,000 mg by mouth daily       aspirin 81 MG tablet Take 1 tablet by mouth daily.  3     hydrochlorothiazide (MICROZIDE) 12.5 MG capsule Take 1 capsule (12.5 mg) by mouth daily 90 capsule 3     ketoconazole (NIZORAL) 2 % external cream APPLY TOPICALLY DAILY 60 g 0     lisinopril (ZESTRIL) 10 MG tablet TAKE 1 TABLET BY MOUTH ONCE DAILY. 90 tablet 3     Multiple Vitamin (DAILY MULTIVITAMIN PO) Take  by mouth.         naproxen (NAPROSYN) 500 MG tablet TAKE 1 TABLET BY MOUTH TWICE  "A DAY WITH MEALS 40 tablet 1     nitroGLYcerin (NITROSTAT) 0.4 MG sublingual tablet For chest pain place 1 tablet under the tongue every 5 minutes for 3 doses. If symptoms persist 5 minutes after 1st dose call 911. 25 tablet 3     Psyllium (METAMUCIL FIBER) 51.7 % PACK        QVAR REDIHALER 80 MCG/ACT inhaler Inhale 1 puff into the lungs 2 times daily Only in winter or Around cats See Admin Instructions Only in winter or Around cats 1 Inhaler 11     rosuvastatin (CRESTOR) 20 MG tablet Take 1 tablet (20 mg) by mouth daily 90 tablet 3     tadalafil (CIALIS) 5 MG tablet Take 1 tablet by mouth as needed for erectile dysfunction. Never use with nitroglycerin, terazosin or doxazosin. 30 tablet 1     Vitamin D, Cholecalciferol, 1000 units CAPS Take 3,000 Units by mouth        zinc gluconate 50 MG tablet Take 50 mg by mouth daily       cyclobenzaprine (FLEXERIL) 10 MG tablet Take 1 tablet (10 mg) by mouth 3 times daily as needed for muscle spasms (Patient not taking: Reported on 2/11/2021) 30 tablet 0     Probiotic Product (PROBIOTIC-10 PO) Take by mouth daily       tetrahydrozoline (VISINE) 0.05 % ophthalmic solution 1 drop 3 times daily         Allergies   Allergen Reactions     Cats      Simvastatin      achey        Social History     Tobacco Use     Smoking status: Never Smoker     Smokeless tobacco: Never Used   Substance Use Topics     Alcohol use: Yes     Alcohol/week: 0.8 standard drinks     Types: 1 Standard drinks or equivalent per week     Comment: occasionally       History   Drug Use No         Objective     BP (!) 153/75   Pulse 61   Temp 97.8  F (36.6  C) (Skin)   Resp 16   Ht 1.854 m (6' 1\")   Wt 97.1 kg (214 lb)   BMI 28.23 kg/m      Physical Exam    GENERAL APPEARANCE: healthy, alert and no distress     EYES: EOMI,  PERRL     HENT: ear canals and TM's normal and nose and mouth without ulcers or lesions     NECK: no adenopathy, no asymmetry, masses, or scars and thyroid normal to palpation     RESP: " lungs clear to auscultation - no rales, rhonchi or wheezes     CV: regular rates and rhythm, normal S1 S2, no S3 or S4 and no murmur, click or rub     ABDOMEN:  soft, nontender, no HSM or masses and bowel sounds normal     MS: extremities normal- no gross deformities noted, no evidence of inflammation in joints, FROM in all extremities.     SKIN: no suspicious lesions or rashes     NEURO: Normal strength and tone, sensory exam grossly normal, mentation intact and speech normal     PSYCH: mentation appears normal. and affect normal/bright     LYMPHATICS: No cervical adenopathy    Recent Labs   Lab Test 07/01/20  1640 07/01/20  1601 06/19/20  0845   HGB 14.9  --   --      --   --    NA  --  137 142   POTASSIUM  --  3.8 4.7   CR  --  1.07 1.04        Diagnostics:  Labs pending at this time.  Results will be reviewed when available.   EKG: sinus bradycardia, normal axis, normal intervals, no acute ST/T changes c/w ischemia, no LVH by voltage criteria, unchanged from previous tracings    Revised Cardiac Risk Index (RCRI):  The patient has the following serious cardiovascular risks for perioperative complications:   - No serious cardiac risks = 0 points     RCRI Interpretation: 0 points: Class I (very low risk - 0.4% complication rate)             Signed Electronically by: Willie Nam MD  Copy of this evaluation report is provided to requesting physician.    [facility] Guidelines    Revised Cardiac Risk Index

## 2021-02-12 LAB
BUN SERPL-MCNC: 20 MG/DL (ref 8–27)
BUN/CREATININE RATIO: 22 (ref 10–24)
CALCIUM SERPL-MCNC: 9.2 MG/DL (ref 8.6–10.2)
CHLORIDE SERPLBLD-SCNC: 97 MMOL/L (ref 96–106)
CREAT SERPL-MCNC: 0.93 MG/DL (ref 0.76–1.27)
EGFR IF AFRICN AM: 97 ML/MIN/1.73
EGFR IF NONAFRICN AM: 84 ML/MIN/1.73
GLUCOSE SERPL-MCNC: 90 MG/DL (ref 65–99)
POTASSIUM SERPL-SCNC: 4.1 MMOL/L (ref 3.5–5.2)
SODIUM SERPL-SCNC: 138 MMOL/L (ref 134–144)
TOTAL CO2: 30 MMOL/L (ref 20–29)

## 2021-02-12 NOTE — TELEPHONE ENCOUNTER
Called pt with recommendations from Dr. Blanca. Order in chart & pt transferred to scheduling to arrange. Chuy BROWNE

## 2021-02-15 ENCOUNTER — HOSPITAL ENCOUNTER (OUTPATIENT)
Dept: CARDIOLOGY | Facility: CLINIC | Age: 71
End: 2021-02-15
Attending: INTERNAL MEDICINE
Payer: COMMERCIAL

## 2021-02-15 ENCOUNTER — TELEPHONE (OUTPATIENT)
Dept: CARDIOLOGY | Facility: CLINIC | Age: 71
End: 2021-02-15

## 2021-02-15 VITALS
HEART RATE: 51 BPM | WEIGHT: 210.2 LBS | DIASTOLIC BLOOD PRESSURE: 64 MMHG | OXYGEN SATURATION: 98 % | SYSTOLIC BLOOD PRESSURE: 122 MMHG | HEIGHT: 72 IN | BODY MASS INDEX: 28.47 KG/M2

## 2021-02-15 DIAGNOSIS — I25.10 CORONARY ARTERY DISEASE INVOLVING NATIVE CORONARY ARTERY OF NATIVE HEART WITHOUT ANGINA PECTORIS: ICD-10-CM

## 2021-02-15 LAB
CV BLOOD PRESSURE: 58 %
CV STRESS MAX HR HE: 69
NUC STRESS EJECTION FRACTION: 56 %
RATE PRESSURE PRODUCT: 9798
STRESS ECHO BASELINE DIASTOLIC HE: 64
STRESS ECHO BASELINE HR: 52
STRESS ECHO BASELINE SYSTOLIC BP: 122
STRESS ECHO CALCULATED PERCENT HR: 45 %
STRESS ECHO LAST STRESS DIASTOLIC BP: 70
STRESS ECHO LAST STRESS SYSTOLIC BP: 142
STRESS ECHO TARGET HR: 152
STRESS/REST PERFUSION RATIO: 1.19

## 2021-02-15 PROCEDURE — 250N000011 HC RX IP 250 OP 636: Performed by: INTERNAL MEDICINE

## 2021-02-15 PROCEDURE — A9502 TC99M TETROFOSMIN: HCPCS | Performed by: INTERNAL MEDICINE

## 2021-02-15 PROCEDURE — 343N000001 HC RX 343: Performed by: INTERNAL MEDICINE

## 2021-02-15 PROCEDURE — 93016 CV STRESS TEST SUPVJ ONLY: CPT | Performed by: INTERNAL MEDICINE

## 2021-02-15 PROCEDURE — 78452 HT MUSCLE IMAGE SPECT MULT: CPT

## 2021-02-15 PROCEDURE — 78452 HT MUSCLE IMAGE SPECT MULT: CPT | Mod: 26 | Performed by: INTERNAL MEDICINE

## 2021-02-15 PROCEDURE — 93018 CV STRESS TEST I&R ONLY: CPT | Performed by: INTERNAL MEDICINE

## 2021-02-15 RX ORDER — CAFFEINE CITRATE 20 MG/ML
60 SOLUTION INTRAVENOUS
Status: DISCONTINUED | OUTPATIENT
Start: 2021-02-15 | End: 2021-02-16 | Stop reason: HOSPADM

## 2021-02-15 RX ORDER — ALBUTEROL SULFATE 90 UG/1
2 AEROSOL, METERED RESPIRATORY (INHALATION) EVERY 5 MIN PRN
Status: DISCONTINUED | OUTPATIENT
Start: 2021-02-15 | End: 2021-02-16 | Stop reason: HOSPADM

## 2021-02-15 RX ORDER — ACYCLOVIR 200 MG/1
0-1 CAPSULE ORAL
Status: DISCONTINUED | OUTPATIENT
Start: 2021-02-15 | End: 2021-02-16 | Stop reason: HOSPADM

## 2021-02-15 RX ORDER — REGADENOSON 0.08 MG/ML
0.4 INJECTION, SOLUTION INTRAVENOUS ONCE
Status: COMPLETED | OUTPATIENT
Start: 2021-02-15 | End: 2021-02-15

## 2021-02-15 RX ORDER — AMINOPHYLLINE 25 MG/ML
50-100 INJECTION, SOLUTION INTRAVENOUS
Status: DISCONTINUED | OUTPATIENT
Start: 2021-02-15 | End: 2021-02-16 | Stop reason: HOSPADM

## 2021-02-15 RX ADMIN — TETROFOSMIN 4.16 MCI.: 1.38 INJECTION, POWDER, LYOPHILIZED, FOR SOLUTION INTRAVENOUS at 09:27

## 2021-02-15 RX ADMIN — TETROFOSMIN 12.84 MCI.: 1.38 INJECTION, POWDER, LYOPHILIZED, FOR SOLUTION INTRAVENOUS at 11:06

## 2021-02-15 RX ADMIN — REGADENOSON 0.4 MG: 0.08 INJECTION, SOLUTION INTRAVENOUS at 11:01

## 2021-02-15 ASSESSMENT — MIFFLIN-ST. JEOR: SCORE: 1761.46

## 2021-02-16 NOTE — TELEPHONE ENCOUNTER
Reviewed lexiscan showing      The nuclear stress test is abnormal.     There is a small area of nontransmural infarction in the basal to mid inferolateral segments of the left ventricle associated with a mild degree of polly-infarct ischemia.     Left ventricular function is normal.     The left ventricular ejection fraction at rest is 58%.  The left ventricular ejection fraction at stress is 56%.     LV chamber size normal.     Stress to rest cavity ratio is 1.19.  TID is absent.     Technically challenging study due to patient body habitus and subdiaphragmatic activity.     There is no prior study for comparison.    Pt has office visit 2/17/21 w/ Dr. Blanca for cardiac clearance for upcoming hip surgery; will message to review. Chuy BROWNE

## 2021-02-17 ENCOUNTER — OFFICE VISIT (OUTPATIENT)
Dept: CARDIOLOGY | Facility: CLINIC | Age: 71
End: 2021-02-17
Payer: COMMERCIAL

## 2021-02-17 VITALS
BODY MASS INDEX: 28.35 KG/M2 | SYSTOLIC BLOOD PRESSURE: 135 MMHG | WEIGHT: 209 LBS | DIASTOLIC BLOOD PRESSURE: 80 MMHG | OXYGEN SATURATION: 97 % | HEART RATE: 65 BPM

## 2021-02-17 DIAGNOSIS — N28.9 RENAL INSUFFICIENCY: ICD-10-CM

## 2021-02-17 DIAGNOSIS — E78.00 HYPERCHOLESTEREMIA: ICD-10-CM

## 2021-02-17 DIAGNOSIS — I10 BENIGN ESSENTIAL HYPERTENSION: ICD-10-CM

## 2021-02-17 DIAGNOSIS — I25.10 CORONARY ARTERY DISEASE INVOLVING NATIVE CORONARY ARTERY OF NATIVE HEART WITHOUT ANGINA PECTORIS: Primary | ICD-10-CM

## 2021-02-17 DIAGNOSIS — Z76.0 ENCOUNTER FOR MEDICATION REFILL: ICD-10-CM

## 2021-02-17 DIAGNOSIS — I10 ESSENTIAL HYPERTENSION: ICD-10-CM

## 2021-02-17 PROCEDURE — 99213 OFFICE O/P EST LOW 20 MIN: CPT | Performed by: INTERNAL MEDICINE

## 2021-02-17 RX ORDER — NITROGLYCERIN 0.4 MG/1
TABLET SUBLINGUAL
Qty: 25 TABLET | Refills: 3 | Status: SHIPPED | OUTPATIENT
Start: 2021-02-17 | End: 2022-10-03

## 2021-02-17 NOTE — PROGRESS NOTES
Service Date: 02/17/2021      HISTORY OF PRESENT ILLNESS:  It is my pleasure to see your patient, Peter Hernandez, who is a 70-year-old patient who had stenting in the past of his right coronary artery when he had a 95% stenosis in the proximal right coronary artery.  He had mild disease elsewhere.        The patient is due to have a hip surgery performed because of severe pain and debility next week.  With respect to his workup, we performed a nuclear stress test.  The nuclear stress test showed a small area of nontransmural infarction in the basal to mid inferolateral segment of the left ventricle with a mild degree of polly-infarct ischemia.  This would be regarded as being a low-risk study.  It was also a technically challenging study due to the patient's body habitus with subdiaphragmatic activity.  So the worst the study shows is a small area of infarction in the inferolateral wall with mild polly-infarct ischemia which is low risk and this also may be artifactual.  We do know that he had a stress echo, which is a different imaging stress test obviously, in the past which was normal.        He is entirely asymptomatic also.  He has no chest pains, no chest pressure, no unusual shortness of breath.  He does not have evidence of malignant dysrhythmias.  He has no symptoms of decompensated congestive heart failure and he has no signs of a severely obstructive valvular lesions.        His most recent nuclear stress test in 2015 showed no evidence of significant valvular disease, so therefore from a clinical point of view, he would also be regarded as being low risk for an intraoperative cardiac event.      IMPRESSION:   1.  Coronary artery disease.  The patient is asymptomatic with respect to coronary artery disease and, as I mentioned above, the most that he has a small area of infarction in the mid to basal inferolateral wall with mild polly-infarct ischemia.  This of course may also be artifactual as this was a  technique challenging study.   2.  Normotensive at 135/80.   3.  Excellent lipid profile last summer.   4.  Normal basic metabolic profile on  with a sodium of 138, potassium of 4.1, BUN of 20 and a creatinine of 0.93.      PLAN:  The patient should be able to proceed ahead with hip replacement without any special precautions during the procedure.  He would be regarded as being low risk for an intraoperative cardiac event.  He will be seeing us for our annual visit 2021.  He has been told by the orthopedic surgical team to stop his aspirin 7-10 days before the procedure, which is safe.  Aspirin should be restarted as soon as it is surgically safe from a bleeding point of view.        As always, the patient has been told to call us if any questions or any concerns.      cc:      Willie Nam MD    [facility]   [address] [place] [address]       Gera Davison MD   [facility]   [facility], [place] [address]         MONY FREEMAN MD, [facility]             D: 2021   T: 2021   MT: [id number]      Name:     NATALY CONTRERAS   MRN:      [id number]        Account:      [id number]   :      1950           Service Date: 2021      Document: [id number]

## 2021-02-17 NOTE — PROGRESS NOTES
HPI and Plan:   See dictation    No orders of the defined types were placed in this encounter.      Orders Placed This Encounter   Medications     nitroGLYcerin (NITROSTAT) 0.4 MG sublingual tablet     Sig: For chest pain place 1 tablet under the tongue every 5 minutes for 3 doses. If symptoms persist 5 minutes after 1st dose call 911.     Dispense:  25 tablet     Refill:  3       Medications Discontinued During This Encounter   Medication Reason     nitroGLYcerin (NITROSTAT) 0.4 MG sublingual tablet Reorder         Encounter Diagnoses   Name Primary?     Coronary artery disease involving native coronary artery of native heart without angina pectoris Yes     Essential hypertension      Hypercholesteremia      Benign essential hypertension      Encounter for medication refill      Renal insufficiency        CURRENT MEDICATIONS:  Current Outpatient Medications   Medication Sig Dispense Refill     albuterol (VENTOLIN HFA) 108 (90 Base) MCG/ACT inhaler Inhale 2 puffs into the lungs every 6 hours as needed for shortness of breath / dyspnea or wheezing 3 Inhaler 3     amLODIPine (NORVASC) 5 MG tablet Take 1 tablet (5 mg) by mouth daily 90 tablet 3     Ascorbic Acid (VITAMIN C) 500 MG CAPS Take 1,000 mg by mouth daily       aspirin 81 MG tablet Take 1 tablet by mouth daily.  3     hydrochlorothiazide (MICROZIDE) 12.5 MG capsule Take 1 capsule (12.5 mg) by mouth daily 90 capsule 3     ketoconazole (NIZORAL) 2 % external cream APPLY TOPICALLY DAILY 60 g 0     lisinopril (ZESTRIL) 10 MG tablet TAKE 1 TABLET BY MOUTH ONCE DAILY. 90 tablet 3     Multiple Vitamin (DAILY MULTIVITAMIN PO) Take  by mouth.         naproxen (NAPROSYN) 500 MG tablet TAKE 1 TABLET BY MOUTH TWICE A DAY WITH MEALS 40 tablet 1     nitroGLYcerin (NITROSTAT) 0.4 MG sublingual tablet For chest pain place 1 tablet under the tongue every 5 minutes for 3 doses. If symptoms persist 5 minutes after 1st dose call 911. 25 tablet 3     Probiotic Product  (PROBIOTIC-10 PO) Take by mouth daily       Psyllium (METAMUCIL FIBER) 51.7 % PACK        QVAR REDIHALER 80 MCG/ACT inhaler Inhale 1 puff into the lungs 2 times daily Only in winter or Around cats See Admin Instructions Only in winter or Around cats 1 Inhaler 11     rosuvastatin (CRESTOR) 20 MG tablet Take 1 tablet (20 mg) by mouth daily 90 tablet 3     tetrahydrozoline (VISINE) 0.05 % ophthalmic solution 1 drop 3 times daily       Vitamin D, Cholecalciferol, 1000 units CAPS Take 3,000 Units by mouth        zinc gluconate 50 MG tablet Take 50 mg by mouth daily       cyclobenzaprine (FLEXERIL) 10 MG tablet Take 1 tablet (10 mg) by mouth 3 times daily as needed for muscle spasms (Patient not taking: Reported on 2/11/2021) 30 tablet 0     tadalafil (CIALIS) 5 MG tablet Take 1 tablet by mouth as needed for erectile dysfunction. Never use with nitroglycerin, terazosin or doxazosin. 30 tablet 1       ALLERGIES     Allergies   Allergen Reactions     Cats      Simvastatin      achey       PAST MEDICAL HISTORY:  Past Medical History:   Diagnosis Date     ACP (advance care planning)     will bring copy in      Bruit      CAD (coronary artery disease) 2002    a stent     Esophageal reflux 5/17/2013     Family history of ischemic heart disease      Hyperlipidaemia      Hypertension        PAST SURGICAL HISTORY:  Past Surgical History:   Procedure Laterality Date     HEART CATH, ANGIOPLASTY  10/4/02    3.0 X 8 mm Velocity stent     ROTATOR CUFF REPAIR RT/LT  2009    Dr. Butler       FAMILY HISTORY:  Family History   Problem Relation Age of Onset     C.A.D. Father      Cancer Father         bone     Cerebrovascular Disease Mother      Coronary Artery Disease Brother      Heart Disease Brother        SOCIAL HISTORY:  Social History     Socioeconomic History     Marital status:      Spouse name: None     Number of children: None     Years of education: None     Highest education level: None   Occupational History      Occupation: Finance   Social Needs     Financial resource strain: None     Food insecurity     Worry: None     Inability: None     Transportation needs     Medical: None     Non-medical: None   Tobacco Use     Smoking status: Never Smoker     Smokeless tobacco: Never Used   Substance and Sexual Activity     Alcohol use: Yes     Alcohol/week: 0.8 standard drinks     Types: 1 Standard drinks or equivalent per week     Comment: occasionally     Drug use: No     Sexual activity: Yes     Partners: Female   Lifestyle     Physical activity     Days per week: None     Minutes per session: None     Stress: None   Relationships     Social connections     Talks on phone: None     Gets together: None     Attends Congregational service: None     Active member of club or organization: None     Attends meetings of clubs or organizations: None     Relationship status: None     Intimate partner violence     Fear of current or ex partner: None     Emotionally abused: None     Physically abused: None     Forced sexual activity: None   Other Topics Concern     Parent/sibling w/ CABG, MI or angioplasty before 65F 55M? No      Service Not Asked     Blood Transfusions Not Asked     Caffeine Concern Not Asked     Occupational Exposure Not Asked     Hobby Hazards Not Asked     Sleep Concern Yes     Comment: occ     Stress Concern No     Weight Concern Not Asked     Special Diet Not Asked     Back Care Not Asked     Exercise Yes     Comment: walks daily     Bike Helmet Not Asked     Seat Belt Not Asked     Self-Exams Not Asked   Social History Narrative     None       Review of Systems:  Skin:  Negative       Eyes:  Positive for glasses    ENT:  Negative      Respiratory:  Positive for   asthma   Cardiovascular:  Negative      Gastroenterology: Negative      Genitourinary:  not assessed      Musculoskeletal:  Positive for gout;joint pain having right hip replacement 2/23/21  Neurologic:  Negative      Psychiatric:  Negative       Heme/Lymph/Imm:  Negative      Endocrine:  Negative        Physical Exam:  Vitals: /80   Pulse 65   Wt 94.8 kg (209 lb)   SpO2 97%   BMI 28.35 kg/m      Constitutional:  cooperative, alert and oriented, well developed, well nourished, in no acute distress overweight      Skin:  warm and dry to the touch, no apparent skin lesions or masses noted          Head:  normocephalic, no masses or lesions        Eyes:  pupils equal and round, conjunctivae and lids unremarkable, sclera white, no xanthalasma, EOMS intact, no nystagmus        Lymph:No Cervical lymphadenopathy present     ENT:  no pallor or cyanosis, dentition good        Neck:  carotid pulses are full and equal bilaterally, JVP normal, no carotid bruit        Respiratory:  normal breath sounds, clear to auscultation, normal A-P diameter, normal symmetry, normal respiratory excursion, no use of accessory muscles         Cardiac: regular rhythm;normal S1 and S2;apical impulse not displaced;no murmurs, gallops or rubs detected   S4            pulses full and equal, no bruits auscultated                                        GI:  not assessed this visit        Extremities and Muscular Skeletal:  no deformities, clubbing, cyanosis, erythema observed;no edema              Neurological:  no gross motor deficits;affect appropriate        Psych:  Alert and Oriented x 3        CC  No referring provider defined for this encounter.

## 2021-02-17 NOTE — LETTER
2/17/2021    Willie Nam MD  [address]  [facility] [address]    RE: Peter Hernandez II       Dear Colleague,    I had the pleasure of seeing Peter Hernandez II in the [facility].    HPI and Plan:   See dictation    No orders of the defined types were placed in this encounter.      Orders Placed This Encounter   Medications     nitroGLYcerin (NITROSTAT) 0.4 MG sublingual tablet     Sig: For chest pain place 1 tablet under the tongue every 5 minutes for 3 doses. If symptoms persist 5 minutes after 1st dose call 911.     Dispense:  25 tablet     Refill:  3       Medications Discontinued During This Encounter   Medication Reason     nitroGLYcerin (NITROSTAT) 0.4 MG sublingual tablet Reorder         Encounter Diagnoses   Name Primary?     Coronary artery disease involving native coronary artery of native heart without angina pectoris Yes     Essential hypertension      Hypercholesteremia      Benign essential hypertension      Encounter for medication refill      Renal insufficiency        CURRENT MEDICATIONS:  Current Outpatient Medications   Medication Sig Dispense Refill     albuterol (VENTOLIN HFA) 108 (90 Base) MCG/ACT inhaler Inhale 2 puffs into the lungs every 6 hours as needed for shortness of breath / dyspnea or wheezing 3 Inhaler 3     amLODIPine (NORVASC) 5 MG tablet Take 1 tablet (5 mg) by mouth daily 90 tablet 3     Ascorbic Acid (VITAMIN C) 500 MG CAPS Take 1,000 mg by mouth daily       aspirin 81 MG tablet Take 1 tablet by mouth daily.  3     hydrochlorothiazide (MICROZIDE) 12.5 MG capsule Take 1 capsule (12.5 mg) by mouth daily 90 capsule 3     ketoconazole (NIZORAL) 2 % external cream APPLY TOPICALLY DAILY 60 g 0     lisinopril (ZESTRIL) 10 MG tablet TAKE 1 TABLET BY MOUTH ONCE DAILY. 90 tablet 3     Multiple Vitamin (DAILY MULTIVITAMIN PO) Take  by mouth.         naproxen (NAPROSYN) 500 MG tablet TAKE 1 TABLET BY MOUTH  TWICE A DAY WITH MEALS 40 tablet 1     nitroGLYcerin (NITROSTAT) 0.4 MG sublingual tablet For chest pain place 1 tablet under the tongue every 5 minutes for 3 doses. If symptoms persist 5 minutes after 1st dose call 911. 25 tablet 3     Probiotic Product (PROBIOTIC-10 PO) Take by mouth daily       Psyllium (METAMUCIL FIBER) 51.7 % PACK        QVAR REDIHALER 80 MCG/ACT inhaler Inhale 1 puff into the lungs 2 times daily Only in winter or Around cats See Admin Instructions Only in winter or Around cats 1 Inhaler 11     rosuvastatin (CRESTOR) 20 MG tablet Take 1 tablet (20 mg) by mouth daily 90 tablet 3     tetrahydrozoline (VISINE) 0.05 % ophthalmic solution 1 drop 3 times daily       Vitamin D, Cholecalciferol, 1000 units CAPS Take 3,000 Units by mouth        zinc gluconate 50 MG tablet Take 50 mg by mouth daily       cyclobenzaprine (FLEXERIL) 10 MG tablet Take 1 tablet (10 mg) by mouth 3 times daily as needed for muscle spasms (Patient not taking: Reported on 2/11/2021) 30 tablet 0     tadalafil (CIALIS) 5 MG tablet Take 1 tablet by mouth as needed for erectile dysfunction. Never use with nitroglycerin, terazosin or doxazosin. 30 tablet 1       ALLERGIES     Allergies   Allergen Reactions     Cats      Simvastatin      achey       PAST MEDICAL HISTORY:  Past Medical History:   Diagnosis Date     ACP (advance care planning)     will bring copy in      Bruit      CAD (coronary artery disease) 2002    a stent     Esophageal reflux 5/17/2013     Family history of ischemic heart disease      Hyperlipidaemia      Hypertension        PAST SURGICAL HISTORY:  Past Surgical History:   Procedure Laterality Date     HEART CATH, ANGIOPLASTY  10/4/02    3.0 X 8 mm Velocity stent     ROTATOR CUFF REPAIR RT/LT  2009    Dr. Butler       FAMILY HISTORY:  Family History   Problem Relation Age of Onset     C.A.D. Father      Cancer Father         bone     Cerebrovascular Disease Mother      Coronary Artery Disease Brother      Heart  Disease Brother        SOCIAL HISTORY:  Social History     Socioeconomic History     Marital status:      Spouse name: None     Number of children: None     Years of education: None     Highest education level: None   Occupational History     Occupation: Finance   Social Needs     Financial resource strain: None     Food insecurity     Worry: None     Inability: None     Transportation needs     Medical: None     Non-medical: None   Tobacco Use     Smoking status: Never Smoker     Smokeless tobacco: Never Used   Substance and Sexual Activity     Alcohol use: Yes     Alcohol/week: 0.8 standard drinks     Types: 1 Standard drinks or equivalent per week     Comment: occasionally     Drug use: No     Sexual activity: Yes     Partners: Female   Lifestyle     Physical activity     Days per week: None     Minutes per session: None     Stress: None   Relationships     Social connections     Talks on phone: None     Gets together: None     Attends Mandaeism service: None     Active member of club or organization: None     Attends meetings of clubs or organizations: None     Relationship status: None     Intimate partner violence     Fear of current or ex partner: None     Emotionally abused: None     Physically abused: None     Forced sexual activity: None   Other Topics Concern     Parent/sibling w/ CABG, MI or angioplasty before 65F 55M? No      Service Not Asked     Blood Transfusions Not Asked     Caffeine Concern Not Asked     Occupational Exposure Not Asked     Hobby Hazards Not Asked     Sleep Concern Yes     Comment: occ     Stress Concern No     Weight Concern Not Asked     Special Diet Not Asked     Back Care Not Asked     Exercise Yes     Comment: walks daily     Bike Helmet Not Asked     Seat Belt Not Asked     Self-Exams Not Asked   Social History Narrative     None       Review of Systems:  Skin:  Negative       Eyes:  Positive for glasses    ENT:  Negative      Respiratory:  Positive for    asthma   Cardiovascular:  Negative      Gastroenterology: Negative      Genitourinary:  not assessed      Musculoskeletal:  Positive for gout;joint pain having right hip replacement 2/23/21  Neurologic:  Negative      Psychiatric:  Negative      Heme/Lymph/Imm:  Negative      Endocrine:  Negative        Physical Exam:  Vitals: /80   Pulse 65   Wt 94.8 kg (209 lb)   SpO2 97%   BMI 28.35 kg/m      Constitutional:  cooperative, alert and oriented, well developed, well nourished, in no acute distress overweight      Skin:  warm and dry to the touch, no apparent skin lesions or masses noted          Head:  normocephalic, no masses or lesions        Eyes:  pupils equal and round, conjunctivae and lids unremarkable, sclera white, no xanthalasma, EOMS intact, no nystagmus        Lymph:No Cervical lymphadenopathy present     ENT:  no pallor or cyanosis, dentition good        Neck:  carotid pulses are full and equal bilaterally, JVP normal, no carotid bruit        Respiratory:  normal breath sounds, clear to auscultation, normal A-P diameter, normal symmetry, normal respiratory excursion, no use of accessory muscles         Cardiac: regular rhythm;normal S1 and S2;apical impulse not displaced;no murmurs, gallops or rubs detected   S4            pulses full and equal, no bruits auscultated                                        GI:  not assessed this visit        Extremities and Muscular Skeletal:  no deformities, clubbing, cyanosis, erythema observed;no edema              Neurological:  no gross motor deficits;affect appropriate        Psych:  Alert and Oriented x 3        CC  No referring provider defined for this encounter.                  Thank you for allowing me to participate in the care of your patient.      Sincerely,     Braulio Blanca MD, MD     [facility]  cc:   No referring provider defined for this encounter.

## 2021-02-17 NOTE — LETTER
2/17/2021      Willie Nam MD  [address]  [facility] [address]      RE: Peter Hernandez BELLA       Dear Colleague,    I had the pleasure of seeing Peter Hernandez II in the [facility].    Service Date: 02/17/2021      HISTORY OF PRESENT ILLNESS:  It is my pleasure to see your patient, Peter Hernandez, who is a 70-year-old patient who had stenting in the past of his right coronary artery when he had a 95% stenosis in the proximal right coronary artery.  He had mild disease elsewhere.        The patient is due to have a hip surgery performed because of severe pain and debility next week.  With respect to his workup, we performed a nuclear stress test.  The nuclear stress test showed a small area of nontransmural infarction in the basal to mid inferolateral segment of the left ventricle with a mild degree of polly-infarct ischemia.  This would be regarded as being a low-risk study.  It was also a technically challenging study due to the patient's body habitus with subdiaphragmatic activity.  So the worst the study shows is a small area of infarction in the inferolateral wall with mild polly-infarct ischemia which is low risk and this also may be artifactual.  We do know that he had a stress echo, which is a different imaging stress test obviously, in the past which was normal.        He is entirely asymptomatic also.  He has no chest pains, no chest pressure, no unusual shortness of breath.  He does not have evidence of malignant dysrhythmias.  He has no symptoms of decompensated congestive heart failure and he has no signs of a severely obstructive valvular lesions.        His most recent nuclear stress test in 2015 showed no evidence of significant valvular disease, so therefore from a clinical point of view, he would also be regarded as being low risk for an intraoperative cardiac event.      IMPRESSION:   1.  Coronary artery disease.  The  patient is asymptomatic with respect to coronary artery disease and, as I mentioned above, the most that he has a small area of infarction in the mid to basal inferolateral wall with mild polly-infarct ischemia.  This of course may also be artifactual as this was a technique challenging study.   2.  Normotensive at 135/80.   3.  Excellent lipid profile last summer.   4.  Normal basic metabolic profile on  with a sodium of 138, potassium of 4.1, BUN of 20 and a creatinine of 0.93.      PLAN:  The patient should be able to proceed ahead with hip replacement without any special precautions during the procedure.  He would be regarded as being low risk for an intraoperative cardiac event.  He will be seeing us for our annual visit 2021.  He has been told by the orthopedic surgical team to stop his aspirin 7-10 days before the procedure, which is safe.  Aspirin should be restarted as soon as it is surgically safe from a bleeding point of view.        As always, the patient has been told to call us if any questions or any concerns.      cc:      Willie Nam MD    [facility]   [address] [place] [address]       Gera Davison MD   [facility]   [address] [address]         MONY FREEMAN MD, [facility]             D: 2021   T: 2021   MT: [id number]      Name:     NATALY CONTRERAS   MRN:      [id number]        Account:      [id number]   :      1950           Service Date: 2021      Document: [id number]          Outpatient Encounter Medications as of 2021   Medication Sig Dispense Refill     albuterol (VENTOLIN HFA) 108 (90 Base) MCG/ACT inhaler Inhale 2 puffs into the lungs every 6 hours as needed for shortness of breath / dyspnea or wheezing 3 Inhaler 3     amLODIPine (NORVASC) 5 MG tablet Take 1 tablet (5 mg) by mouth daily 90 tablet 3     Ascorbic Acid (VITAMIN C) 500 MG CAPS Take 1,000 mg by mouth daily       aspirin 81 MG tablet Take 1  tablet by mouth daily.  3     hydrochlorothiazide (MICROZIDE) 12.5 MG capsule Take 1 capsule (12.5 mg) by mouth daily 90 capsule 3     ketoconazole (NIZORAL) 2 % external cream APPLY TOPICALLY DAILY 60 g 0     lisinopril (ZESTRIL) 10 MG tablet TAKE 1 TABLET BY MOUTH ONCE DAILY. 90 tablet 3     Multiple Vitamin (DAILY MULTIVITAMIN PO) Take  by mouth.         naproxen (NAPROSYN) 500 MG tablet TAKE 1 TABLET BY MOUTH TWICE A DAY WITH MEALS 40 tablet 1     nitroGLYcerin (NITROSTAT) 0.4 MG sublingual tablet For chest pain place 1 tablet under the tongue every 5 minutes for 3 doses. If symptoms persist 5 minutes after 1st dose call 911. 25 tablet 3     Probiotic Product (PROBIOTIC-10 PO) Take by mouth daily       Psyllium (METAMUCIL FIBER) 51.7 % PACK        QVAR REDIHALER 80 MCG/ACT inhaler Inhale 1 puff into the lungs 2 times daily Only in winter or Around cats See Admin Instructions Only in winter or Around cats 1 Inhaler 11     rosuvastatin (CRESTOR) 20 MG tablet Take 1 tablet (20 mg) by mouth daily 90 tablet 3     tetrahydrozoline (VISINE) 0.05 % ophthalmic solution 1 drop 3 times daily       Vitamin D, Cholecalciferol, 1000 units CAPS Take 3,000 Units by mouth        zinc gluconate 50 MG tablet Take 50 mg by mouth daily       cyclobenzaprine (FLEXERIL) 10 MG tablet Take 1 tablet (10 mg) by mouth 3 times daily as needed for muscle spasms (Patient not taking: Reported on 2/11/2021) 30 tablet 0     tadalafil (CIALIS) 5 MG tablet Take 1 tablet by mouth as needed for erectile dysfunction. Never use with nitroglycerin, terazosin or doxazosin. 30 tablet 1     [DISCONTINUED] nitroGLYcerin (NITROSTAT) 0.4 MG sublingual tablet For chest pain place 1 tablet under the tongue every 5 minutes for 3 doses. If symptoms persist 5 minutes after 1st dose call 911. 25 tablet 3     No facility-administered encounter medications on file as of 2/17/2021.        Again, thank you for allowing me to participate in the care of your patient.       Sincerely,    Braulio Blanca MD, MD     [facility]

## 2021-02-19 DIAGNOSIS — Z11.59 ENCOUNTER FOR SCREENING FOR OTHER VIRAL DISEASES: ICD-10-CM

## 2021-02-19 LAB
LABORATORY COMMENT REPORT: NORMAL
SARS-COV-2 RNA RESP QL NAA+PROBE: NEGATIVE
SARS-COV-2 RNA RESP QL NAA+PROBE: NORMAL
SPECIMEN SOURCE: NORMAL
SPECIMEN SOURCE: NORMAL

## 2021-02-19 PROCEDURE — U0005 INFEC AGEN DETEC AMPLI PROBE: HCPCS | Performed by: ORTHOPAEDIC SURGERY

## 2021-02-19 PROCEDURE — U0003 INFECTIOUS AGENT DETECTION BY NUCLEIC ACID (DNA OR RNA); SEVERE ACUTE RESPIRATORY SYNDROME CORONAVIRUS 2 (SARS-COV-2) (CORONAVIRUS DISEASE [COVID-19]), AMPLIFIED PROBE TECHNIQUE, MAKING USE OF HIGH THROUGHPUT TECHNOLOGIES AS DESCRIBED BY CMS-2020-01-R: HCPCS | Performed by: ORTHOPAEDIC SURGERY

## 2021-02-22 NOTE — PROGRESS NOTES
PTA medications updated by Medication Scribe prior to surgery via phone call with patient        Comments:    Medication history sources: Patient, Surescripts, H&P and Patient's home med list  Medication history source reliability: Good  Adherence assessment: N/A Not Observed    Significant changes made to the medication list:  Patient reports no longer taking the following meds (med scribe removed from PTA med list): cyclobenzaprine, Cialis      Additional medication history information:   Patient brought own home meds: Albuterol inhaler, Qvar inhaler        Prior to Admission medications    Medication Sig Last Dose Taking? Auth Provider   albuterol (VENTOLIN HFA) 108 (90 Base) MCG/ACT inhaler Inhale 2 puffs into the lungs every 6 hours as needed for shortness of breath / dyspnea or wheezing 2/19/2021 at PRN Yes Willie Nam MD   amLODIPine (NORVASC) 5 MG tablet Take 1 tablet (5 mg) by mouth daily  at AM Yes Willie Nam MD   Ascorbic Acid (VITAMIN C) 500 MG CAPS Take 500 mg by mouth 2 times daily (with meals)   at PM Yes Reported, Patient   aspirin 81 MG tablet Take 1 tablet by mouth At Bedtime  2/18/2021 at HS Yes Willie Nam MD   hydrochlorothiazide (MICROZIDE) 12.5 MG capsule Take 1 capsule (12.5 mg) by mouth daily 2/22/2021 at AM Yes Willie Nam MD   ketoconazole (NIZORAL) 2 % external cream APPLY TOPICALLY DAILY  Patient taking differently: Apply topically daily as needed (to groin area) APPLY TOPICALLY DAILY over 3 weeks ago at PRN Yes Willie Nam MD   lisinopril (ZESTRIL) 10 MG tablet TAKE 1 TABLET BY MOUTH ONCE DAILY.  Patient taking differently: Take 10 mg by mouth every morning TAKE 1 TABLET BY MOUTH ONCE DAILY.  at AM Yes Willie Nam MD   Multiple Vitamin (DAILY MULTIVITAMIN PO) Take 2 capsules by mouth every morning  2/22/2021 at AM Yes Reported, Patient   Naphazoline-Pheniramine (OPCON-A OP) Place 1 drop into both eyes daily as needed (cat allergies)  2/21/2021 at PRN Yes Reported, Patient   naproxen (NAPROSYN) 500 MG tablet TAKE 1 TABLET BY MOUTH TWICE A DAY WITH MEALS  Patient taking differently: Take 500 mg by mouth every morning TAKE 1 TABLET BY MOUTH TWICE A DAY WITH MEALS 2/18/2021 at AM Yes Willie Nam MD   nitroGLYcerin (NITROSTAT) 0.4 MG sublingual tablet For chest pain place 1 tablet under the tongue every 5 minutes for 3 doses. If symptoms persist 5 minutes after 1st dose call 911. Never used yet. at PRN Yes Braulio Blanca MD   Probiotic Product (PROBIOTIC-10 PO) Take 1 capsule by mouth every morning  2/22/2021 at AM Yes Reported, Patient   Psyllium (METAMUCIL FIBER PO) Take 1-2 teaspoonful by mouth daily as needed (mix with a glass of water and drink) 2/22/2021 at AM Yes Reported, Patient   QVAR REDIHALER 80 MCG/ACT inhaler Inhale 1 puff into the lungs 2 times daily Only in winter or Around cats See Admin Instructions Only in winter or Around cats  Patient taking differently: Inhale 1 puff into the lungs daily as needed Only in winter or Around cats See Admin Instructions Only in winter or Around cats 2/19/2021 at PRN Yes Willie Nam MD   rosuvastatin (CRESTOR) 20 MG tablet Take 1 tablet (20 mg) by mouth daily  at HS Yes Willie Nam MD   Vitamin D, Cholecalciferol, 1000 units CAPS Take 3,000 Units by mouth every morning  2/22/2021 at AM Yes Reported, Patient   zinc gluconate 50 MG tablet Take 50 mg by mouth every morning  2/22/2021 at AM Yes Reported, Patient

## 2021-02-23 ENCOUNTER — APPOINTMENT (OUTPATIENT)
Dept: PHYSICAL THERAPY | Facility: CLINIC | Age: 71
End: 2021-02-23
Attending: ORTHOPAEDIC SURGERY
Payer: COMMERCIAL

## 2021-02-23 ENCOUNTER — APPOINTMENT (OUTPATIENT)
Dept: GENERAL RADIOLOGY | Facility: CLINIC | Age: 71
End: 2021-02-23
Attending: ORTHOPAEDIC SURGERY
Payer: COMMERCIAL

## 2021-02-23 ENCOUNTER — ANESTHESIA EVENT (OUTPATIENT)
Dept: SURGERY | Facility: CLINIC | Age: 71
End: 2021-02-23
Payer: COMMERCIAL

## 2021-02-23 ENCOUNTER — HOSPITAL ENCOUNTER (OUTPATIENT)
Facility: CLINIC | Age: 71
Discharge: HOME | End: 2021-02-24
Attending: ORTHOPAEDIC SURGERY | Admitting: ORTHOPAEDIC SURGERY
Payer: COMMERCIAL

## 2021-02-23 ENCOUNTER — ANESTHESIA (OUTPATIENT)
Dept: SURGERY | Facility: CLINIC | Age: 71
End: 2021-02-23
Payer: COMMERCIAL

## 2021-02-23 DIAGNOSIS — Z96.641 STATUS POST TOTAL HIP REPLACEMENT, RIGHT: Primary | ICD-10-CM

## 2021-02-23 LAB
ABO + RH BLD: NORMAL
ABO + RH BLD: NORMAL
BLD GP AB SCN SERPL QL: NORMAL
BLOOD BANK CMNT PATIENT-IMP: NORMAL
Lab: NORMAL
Lab: NORMAL
MRSA SCREENING CULTURE: NORMAL
POTASSIUM SERPL-SCNC: 3.6 MMOL/L (ref 3.4–5.3)
SPECIMEN EXP DATE BLD: NORMAL

## 2021-02-23 PROCEDURE — 97110 THERAPEUTIC EXERCISES: CPT | Mod: GP | Performed by: PHYSICAL THERAPIST

## 2021-02-23 PROCEDURE — 999N000141 HC STATISTIC PRE-PROCEDURE NURSING ASSESSMENT: Performed by: ORTHOPAEDIC SURGERY

## 2021-02-23 PROCEDURE — 250N000009 HC RX 250

## 2021-02-23 PROCEDURE — 272N000001 HC OR GENERAL SUPPLY STERILE: Performed by: ORTHOPAEDIC SURGERY

## 2021-02-23 PROCEDURE — 999N000063 XR PELVIS AD HIP PORTABLE RIGHT 1 VIEW

## 2021-02-23 PROCEDURE — 250N000009 HC RX 250: Performed by: ORTHOPAEDIC SURGERY

## 2021-02-23 PROCEDURE — 250N000013 HC RX MED GY IP 250 OP 250 PS 637: Performed by: PHYSICIAN ASSISTANT

## 2021-02-23 PROCEDURE — 250N000011 HC RX IP 250 OP 636: Performed by: SURGERY

## 2021-02-23 PROCEDURE — 360N000077 HC SURGERY LEVEL 4, PER MIN: Performed by: ORTHOPAEDIC SURGERY

## 2021-02-23 PROCEDURE — 84132 ASSAY OF SERUM POTASSIUM: CPT | Performed by: SURGERY

## 2021-02-23 PROCEDURE — 97161 PT EVAL LOW COMPLEX 20 MIN: CPT | Mod: GP | Performed by: PHYSICAL THERAPIST

## 2021-02-23 PROCEDURE — 250N000011 HC RX IP 250 OP 636: Performed by: ORTHOPAEDIC SURGERY

## 2021-02-23 PROCEDURE — 258N000001 HC RX 258: Performed by: ORTHOPAEDIC SURGERY

## 2021-02-23 PROCEDURE — C1713 ANCHOR/SCREW BN/BN,TIS/BN: HCPCS | Performed by: ORTHOPAEDIC SURGERY

## 2021-02-23 PROCEDURE — 250N000013 HC RX MED GY IP 250 OP 250 PS 637: Performed by: ORTHOPAEDIC SURGERY

## 2021-02-23 PROCEDURE — 258N000003 HC RX IP 258 OP 636: Performed by: ORTHOPAEDIC SURGERY

## 2021-02-23 PROCEDURE — 258N000003 HC RX IP 258 OP 636: Performed by: SURGERY

## 2021-02-23 PROCEDURE — 36415 COLL VENOUS BLD VENIPUNCTURE: CPT | Performed by: SURGERY

## 2021-02-23 PROCEDURE — 99218 PR INITIAL OBSERVATION CARE,LEVEL I: CPT | Performed by: PHYSICIAN ASSISTANT

## 2021-02-23 PROCEDURE — 370N000017 HC ANESTHESIA TECHNICAL FEE, PER MIN: Performed by: ORTHOPAEDIC SURGERY

## 2021-02-23 PROCEDURE — 86900 BLOOD TYPING SEROLOGIC ABO: CPT | Performed by: SURGERY

## 2021-02-23 PROCEDURE — 250N000011 HC RX IP 250 OP 636

## 2021-02-23 PROCEDURE — 86901 BLOOD TYPING SEROLOGIC RH(D): CPT | Performed by: SURGERY

## 2021-02-23 PROCEDURE — 97530 THERAPEUTIC ACTIVITIES: CPT | Mod: GP | Performed by: PHYSICAL THERAPIST

## 2021-02-23 PROCEDURE — 710N000009 HC RECOVERY PHASE 1, LEVEL 1, PER MIN: Performed by: ORTHOPAEDIC SURGERY

## 2021-02-23 PROCEDURE — 258N000003 HC RX IP 258 OP 636

## 2021-02-23 PROCEDURE — 86850 RBC ANTIBODY SCREEN: CPT | Performed by: SURGERY

## 2021-02-23 PROCEDURE — C1776 JOINT DEVICE (IMPLANTABLE): HCPCS | Performed by: ORTHOPAEDIC SURGERY

## 2021-02-23 PROCEDURE — 97116 GAIT TRAINING THERAPY: CPT | Mod: GP | Performed by: PHYSICAL THERAPIST

## 2021-02-23 PROCEDURE — 250N000011 HC RX IP 250 OP 636: Performed by: PHYSICIAN ASSISTANT

## 2021-02-23 DEVICE — IMPLANTABLE DEVICE: Type: IMPLANTABLE DEVICE | Site: HIP | Status: FUNCTIONAL

## 2021-02-23 DEVICE — IMP SHELL SNR ACET R3 3H 56MM 71335556: Type: IMPLANTABLE DEVICE | Site: HIP | Status: FUNCTIONAL

## 2021-02-23 DEVICE — IMP STEM SNN HIGH OFFSET SZ 7 71356107: Type: IMPLANTABLE DEVICE | Site: HIP | Status: FUNCTIONAL

## 2021-02-23 DEVICE — IMP SCR ACET SNN SPHERICAL HEAD 6.5X25MM 71332525: Type: IMPLANTABLE DEVICE | Site: HIP | Status: FUNCTIONAL

## 2021-02-23 DEVICE — HEAD OX MODULAR 40MM: Type: IMPLANTABLE DEVICE | Site: HIP | Status: FUNCTIONAL

## 2021-02-23 RX ORDER — NALOXONE HYDROCHLORIDE 0.4 MG/ML
0.4 INJECTION, SOLUTION INTRAMUSCULAR; INTRAVENOUS; SUBCUTANEOUS
Status: ACTIVE | OUTPATIENT
Start: 2021-02-23 | End: 2021-02-24

## 2021-02-23 RX ORDER — AMOXICILLIN 250 MG
1-2 CAPSULE ORAL 2 TIMES DAILY
Qty: 30 TABLET | Refills: 0 | Status: SHIPPED | OUTPATIENT
Start: 2021-02-23

## 2021-02-23 RX ORDER — NALOXONE HYDROCHLORIDE 0.4 MG/ML
0.2 INJECTION, SOLUTION INTRAMUSCULAR; INTRAVENOUS; SUBCUTANEOUS
Status: ACTIVE | OUTPATIENT
Start: 2021-02-23 | End: 2021-02-24

## 2021-02-23 RX ORDER — ACETAMINOPHEN 325 MG/1
650 TABLET ORAL EVERY 4 HOURS PRN
Qty: 100 TABLET | Refills: 0 | Status: SHIPPED | OUTPATIENT
Start: 2021-02-23 | End: 2023-10-09

## 2021-02-23 RX ORDER — CEFAZOLIN SODIUM 1 G/3ML
1 INJECTION, POWDER, FOR SOLUTION INTRAMUSCULAR; INTRAVENOUS SEE ADMIN INSTRUCTIONS
Status: DISCONTINUED | OUTPATIENT
Start: 2021-02-23 | End: 2021-02-23 | Stop reason: HOSPADM

## 2021-02-23 RX ORDER — HYDROMORPHONE HYDROCHLORIDE 1 MG/ML
0.4 INJECTION, SOLUTION INTRAMUSCULAR; INTRAVENOUS; SUBCUTANEOUS
Status: DISCONTINUED | OUTPATIENT
Start: 2021-02-23 | End: 2021-02-24 | Stop reason: HOSPADM

## 2021-02-23 RX ORDER — AMLODIPINE BESYLATE 5 MG/1
5 TABLET ORAL DAILY
Status: DISCONTINUED | OUTPATIENT
Start: 2021-02-24 | End: 2021-02-24 | Stop reason: HOSPADM

## 2021-02-23 RX ORDER — HYDROCHLOROTHIAZIDE 12.5 MG/1
12.5 CAPSULE ORAL DAILY
Status: DISCONTINUED | OUTPATIENT
Start: 2021-02-23 | End: 2021-02-23

## 2021-02-23 RX ORDER — LIDOCAINE 40 MG/G
CREAM TOPICAL
Status: DISCONTINUED | OUTPATIENT
Start: 2021-02-23 | End: 2021-02-24 | Stop reason: HOSPADM

## 2021-02-23 RX ORDER — DEXAMETHASONE SODIUM PHOSPHATE 4 MG/ML
INJECTION, SOLUTION INTRA-ARTICULAR; INTRALESIONAL; INTRAMUSCULAR; INTRAVENOUS; SOFT TISSUE PRN
Status: DISCONTINUED | OUTPATIENT
Start: 2021-02-23 | End: 2021-02-23

## 2021-02-23 RX ORDER — ONDANSETRON 2 MG/ML
INJECTION INTRAMUSCULAR; INTRAVENOUS PRN
Status: DISCONTINUED | OUTPATIENT
Start: 2021-02-23 | End: 2021-02-23

## 2021-02-23 RX ORDER — MAGNESIUM HYDROXIDE 1200 MG/15ML
LIQUID ORAL PRN
Status: DISCONTINUED | OUTPATIENT
Start: 2021-02-23 | End: 2021-02-23 | Stop reason: HOSPADM

## 2021-02-23 RX ORDER — ACETAMINOPHEN 325 MG/1
975 TABLET ORAL EVERY 8 HOURS
Status: DISCONTINUED | OUTPATIENT
Start: 2021-02-23 | End: 2021-02-24 | Stop reason: HOSPADM

## 2021-02-23 RX ORDER — BISACODYL 10 MG
10 SUPPOSITORY, RECTAL RECTAL DAILY PRN
Status: DISCONTINUED | OUTPATIENT
Start: 2021-02-23 | End: 2021-02-24 | Stop reason: HOSPADM

## 2021-02-23 RX ORDER — LISINOPRIL 10 MG/1
10 TABLET ORAL EVERY MORNING
Status: DISCONTINUED | OUTPATIENT
Start: 2021-02-24 | End: 2021-02-24 | Stop reason: HOSPADM

## 2021-02-23 RX ORDER — TRANEXAMIC ACID 650 MG/1
1950 TABLET ORAL ONCE
Status: COMPLETED | OUTPATIENT
Start: 2021-02-23 | End: 2021-02-23

## 2021-02-23 RX ORDER — ONDANSETRON 2 MG/ML
4 INJECTION INTRAMUSCULAR; INTRAVENOUS EVERY 30 MIN PRN
Status: DISCONTINUED | OUTPATIENT
Start: 2021-02-23 | End: 2021-02-23 | Stop reason: HOSPADM

## 2021-02-23 RX ORDER — CEFAZOLIN SODIUM 2 G/100ML
2 INJECTION, SOLUTION INTRAVENOUS
Status: COMPLETED | OUTPATIENT
Start: 2021-02-23 | End: 2021-02-23

## 2021-02-23 RX ORDER — ACETAMINOPHEN 325 MG/1
650 TABLET ORAL EVERY 4 HOURS PRN
Status: DISCONTINUED | OUTPATIENT
Start: 2021-02-26 | End: 2021-02-24 | Stop reason: HOSPADM

## 2021-02-23 RX ORDER — HYDROXYZINE HYDROCHLORIDE 10 MG/1
10 TABLET, FILM COATED ORAL EVERY 6 HOURS PRN
Qty: 30 TABLET | Refills: 0 | Status: SHIPPED | OUTPATIENT
Start: 2021-02-23 | End: 2022-07-12

## 2021-02-23 RX ORDER — GLYCOPYRROLATE 0.2 MG/ML
INJECTION, SOLUTION INTRAMUSCULAR; INTRAVENOUS PRN
Status: DISCONTINUED | OUTPATIENT
Start: 2021-02-23 | End: 2021-02-23

## 2021-02-23 RX ORDER — HYDROMORPHONE HYDROCHLORIDE 1 MG/ML
.3-.5 INJECTION, SOLUTION INTRAMUSCULAR; INTRAVENOUS; SUBCUTANEOUS EVERY 5 MIN PRN
Status: DISCONTINUED | OUTPATIENT
Start: 2021-02-23 | End: 2021-02-23 | Stop reason: HOSPADM

## 2021-02-23 RX ORDER — SODIUM CHLORIDE, SODIUM LACTATE, POTASSIUM CHLORIDE, CALCIUM CHLORIDE 600; 310; 30; 20 MG/100ML; MG/100ML; MG/100ML; MG/100ML
INJECTION, SOLUTION INTRAVENOUS CONTINUOUS
Status: DISCONTINUED | OUTPATIENT
Start: 2021-02-23 | End: 2021-02-23 | Stop reason: HOSPADM

## 2021-02-23 RX ORDER — PROPOFOL 10 MG/ML
INJECTION, EMULSION INTRAVENOUS CONTINUOUS PRN
Status: DISCONTINUED | OUTPATIENT
Start: 2021-02-23 | End: 2021-02-23

## 2021-02-23 RX ORDER — EPHEDRINE SULFATE 50 MG/ML
INJECTION, SOLUTION INTRAMUSCULAR; INTRAVENOUS; SUBCUTANEOUS PRN
Status: DISCONTINUED | OUTPATIENT
Start: 2021-02-23 | End: 2021-02-23

## 2021-02-23 RX ORDER — BUPIVACAINE HYDROCHLORIDE 7.5 MG/ML
INJECTION, SOLUTION INTRASPINAL PRN
Status: DISCONTINUED | OUTPATIENT
Start: 2021-02-23 | End: 2021-02-23

## 2021-02-23 RX ORDER — ONDANSETRON 4 MG/1
4 TABLET, ORALLY DISINTEGRATING ORAL EVERY 6 HOURS PRN
Status: DISCONTINUED | OUTPATIENT
Start: 2021-02-23 | End: 2021-02-24 | Stop reason: HOSPADM

## 2021-02-23 RX ORDER — FENTANYL CITRATE 50 UG/ML
25-50 INJECTION, SOLUTION INTRAMUSCULAR; INTRAVENOUS
Status: DISCONTINUED | OUTPATIENT
Start: 2021-02-23 | End: 2021-02-23 | Stop reason: HOSPADM

## 2021-02-23 RX ORDER — ROSUVASTATIN CALCIUM 20 MG/1
20 TABLET, COATED ORAL DAILY
Status: DISCONTINUED | OUTPATIENT
Start: 2021-02-23 | End: 2021-02-24 | Stop reason: HOSPADM

## 2021-02-23 RX ORDER — AMOXICILLIN 250 MG
1 CAPSULE ORAL 2 TIMES DAILY
Status: DISCONTINUED | OUTPATIENT
Start: 2021-02-23 | End: 2021-02-24 | Stop reason: HOSPADM

## 2021-02-23 RX ORDER — SODIUM CHLORIDE, SODIUM LACTATE, POTASSIUM CHLORIDE, CALCIUM CHLORIDE 600; 310; 30; 20 MG/100ML; MG/100ML; MG/100ML; MG/100ML
INJECTION, SOLUTION INTRAVENOUS CONTINUOUS
Status: DISCONTINUED | OUTPATIENT
Start: 2021-02-23 | End: 2021-02-24 | Stop reason: HOSPADM

## 2021-02-23 RX ORDER — OXYCODONE HYDROCHLORIDE 5 MG/1
5 TABLET ORAL EVERY 4 HOURS PRN
Status: DISCONTINUED | OUTPATIENT
Start: 2021-02-23 | End: 2021-02-24 | Stop reason: HOSPADM

## 2021-02-23 RX ORDER — OXYCODONE HYDROCHLORIDE 5 MG/1
5-10 TABLET ORAL
Qty: 40 TABLET | Refills: 0 | Status: SHIPPED | OUTPATIENT
Start: 2021-02-23 | End: 2021-05-11

## 2021-02-23 RX ORDER — HYDROMORPHONE HYDROCHLORIDE 1 MG/ML
0.2 INJECTION, SOLUTION INTRAMUSCULAR; INTRAVENOUS; SUBCUTANEOUS
Status: DISCONTINUED | OUTPATIENT
Start: 2021-02-23 | End: 2021-02-24 | Stop reason: HOSPADM

## 2021-02-23 RX ORDER — POLYETHYLENE GLYCOL 3350 17 G/17G
17 POWDER, FOR SOLUTION ORAL DAILY
Status: DISCONTINUED | OUTPATIENT
Start: 2021-02-24 | End: 2021-02-24 | Stop reason: HOSPADM

## 2021-02-23 RX ORDER — DOCUSATE SODIUM 100 MG/1
100 CAPSULE, LIQUID FILLED ORAL 2 TIMES DAILY
Status: DISCONTINUED | OUTPATIENT
Start: 2021-02-23 | End: 2021-02-24 | Stop reason: HOSPADM

## 2021-02-23 RX ORDER — ONDANSETRON 2 MG/ML
4 INJECTION INTRAMUSCULAR; INTRAVENOUS EVERY 6 HOURS PRN
Status: DISCONTINUED | OUTPATIENT
Start: 2021-02-23 | End: 2021-02-24 | Stop reason: HOSPADM

## 2021-02-23 RX ORDER — ALBUTEROL SULFATE 90 UG/1
2 AEROSOL, METERED RESPIRATORY (INHALATION) EVERY 6 HOURS PRN
Status: DISCONTINUED | OUTPATIENT
Start: 2021-02-23 | End: 2021-02-24 | Stop reason: HOSPADM

## 2021-02-23 RX ORDER — PROCHLORPERAZINE MALEATE 5 MG
5 TABLET ORAL EVERY 6 HOURS PRN
Status: DISCONTINUED | OUTPATIENT
Start: 2021-02-23 | End: 2021-02-24 | Stop reason: HOSPADM

## 2021-02-23 RX ORDER — ONDANSETRON 4 MG/1
4 TABLET, ORALLY DISINTEGRATING ORAL EVERY 30 MIN PRN
Status: DISCONTINUED | OUTPATIENT
Start: 2021-02-23 | End: 2021-02-23 | Stop reason: HOSPADM

## 2021-02-23 RX ORDER — BUDESONIDE 0.5 MG/2ML
0.5 INHALANT ORAL 2 TIMES DAILY PRN
Status: DISCONTINUED | OUTPATIENT
Start: 2021-02-23 | End: 2021-02-24 | Stop reason: HOSPADM

## 2021-02-23 RX ORDER — HYDROXYZINE HYDROCHLORIDE 10 MG/1
10 TABLET, FILM COATED ORAL EVERY 6 HOURS PRN
Status: DISCONTINUED | OUTPATIENT
Start: 2021-02-23 | End: 2021-02-24 | Stop reason: HOSPADM

## 2021-02-23 RX ORDER — OXYCODONE HYDROCHLORIDE 5 MG/1
10 TABLET ORAL EVERY 4 HOURS PRN
Status: DISCONTINUED | OUTPATIENT
Start: 2021-02-23 | End: 2021-02-24 | Stop reason: HOSPADM

## 2021-02-23 RX ORDER — CEFAZOLIN SODIUM 2 G/100ML
2 INJECTION, SOLUTION INTRAVENOUS EVERY 8 HOURS
Status: COMPLETED | OUTPATIENT
Start: 2021-02-23 | End: 2021-02-24

## 2021-02-23 RX ADMIN — Medication 5 MG: at 09:43

## 2021-02-23 RX ADMIN — GLYCOPYRROLATE 0.1 MG: 0.2 INJECTION, SOLUTION INTRAMUSCULAR; INTRAVENOUS at 09:23

## 2021-02-23 RX ADMIN — PHENYLEPHRINE HYDROCHLORIDE 50 MCG: 10 INJECTION INTRAVENOUS at 09:15

## 2021-02-23 RX ADMIN — PHENYLEPHRINE HYDROCHLORIDE 100 MCG: 10 INJECTION INTRAVENOUS at 09:26

## 2021-02-23 RX ADMIN — ASPIRIN 325 MG: 325 TABLET, COATED ORAL at 12:50

## 2021-02-23 RX ADMIN — PHENYLEPHRINE HYDROCHLORIDE 100 MCG: 10 INJECTION INTRAVENOUS at 10:18

## 2021-02-23 RX ADMIN — MIDAZOLAM 2 MG: 1 INJECTION INTRAMUSCULAR; INTRAVENOUS at 08:48

## 2021-02-23 RX ADMIN — ONDANSETRON 4 MG: 2 INJECTION INTRAMUSCULAR; INTRAVENOUS at 10:11

## 2021-02-23 RX ADMIN — Medication 5 MG: at 09:12

## 2021-02-23 RX ADMIN — ACETAMINOPHEN 975 MG: 325 TABLET, FILM COATED ORAL at 12:50

## 2021-02-23 RX ADMIN — TRANEXAMIC ACID 1950 MG: 650 TABLET ORAL at 07:42

## 2021-02-23 RX ADMIN — PHENYLEPHRINE HYDROCHLORIDE 100 MCG: 10 INJECTION INTRAVENOUS at 09:08

## 2021-02-23 RX ADMIN — DOCUSATE SODIUM 50 MG AND SENNOSIDES 8.6 MG 1 TABLET: 8.6; 5 TABLET, FILM COATED ORAL at 12:49

## 2021-02-23 RX ADMIN — Medication 5 MG: at 09:04

## 2021-02-23 RX ADMIN — DOCUSATE SODIUM 50 MG AND SENNOSIDES 8.6 MG 1 TABLET: 8.6; 5 TABLET, FILM COATED ORAL at 20:11

## 2021-02-23 RX ADMIN — PHENYLEPHRINE HYDROCHLORIDE 100 MCG: 10 INJECTION INTRAVENOUS at 09:00

## 2021-02-23 RX ADMIN — DOCUSATE SODIUM 100 MG: 100 CAPSULE, LIQUID FILLED ORAL at 12:50

## 2021-02-23 RX ADMIN — ACETAMINOPHEN 975 MG: 325 TABLET, FILM COATED ORAL at 20:12

## 2021-02-23 RX ADMIN — PROPOFOL 75 MCG/KG/MIN: 10 INJECTION, EMULSION INTRAVENOUS at 09:00

## 2021-02-23 RX ADMIN — OXYCODONE HYDROCHLORIDE 5 MG: 5 TABLET ORAL at 21:59

## 2021-02-23 RX ADMIN — SODIUM CHLORIDE, POTASSIUM CHLORIDE, SODIUM LACTATE AND CALCIUM CHLORIDE: 600; 310; 30; 20 INJECTION, SOLUTION INTRAVENOUS at 07:41

## 2021-02-23 RX ADMIN — DOCUSATE SODIUM 100 MG: 100 CAPSULE, LIQUID FILLED ORAL at 20:12

## 2021-02-23 RX ADMIN — GLYCOPYRROLATE 0.1 MG: 0.2 INJECTION, SOLUTION INTRAMUSCULAR; INTRAVENOUS at 09:21

## 2021-02-23 RX ADMIN — DEXAMETHASONE SODIUM PHOSPHATE 4 MG: 4 INJECTION, SOLUTION INTRA-ARTICULAR; INTRALESIONAL; INTRAMUSCULAR; INTRAVENOUS; SOFT TISSUE at 09:14

## 2021-02-23 RX ADMIN — ROSUVASTATIN CALCIUM 20 MG: 20 TABLET, FILM COATED ORAL at 12:49

## 2021-02-23 RX ADMIN — CEFAZOLIN SODIUM 2 G: 2 INJECTION, SOLUTION INTRAVENOUS at 17:45

## 2021-02-23 RX ADMIN — Medication 5 MG: at 09:25

## 2021-02-23 RX ADMIN — Medication 5 MG: at 09:15

## 2021-02-23 RX ADMIN — PHENYLEPHRINE HYDROCHLORIDE 100 MCG: 10 INJECTION INTRAVENOUS at 09:19

## 2021-02-23 RX ADMIN — Medication 5 MG: at 09:19

## 2021-02-23 RX ADMIN — PHENYLEPHRINE HYDROCHLORIDE 50 MCG: 10 INJECTION INTRAVENOUS at 09:12

## 2021-02-23 RX ADMIN — CEFAZOLIN SODIUM 2 G: 2 INJECTION, SOLUTION INTRAVENOUS at 08:58

## 2021-02-23 RX ADMIN — PHENYLEPHRINE HYDROCHLORIDE 0.25 MCG/KG/MIN: 10 INJECTION INTRAVENOUS at 09:29

## 2021-02-23 RX ADMIN — HYDROMORPHONE HYDROCHLORIDE 0.5 MG: 1 INJECTION, SOLUTION INTRAMUSCULAR; INTRAVENOUS; SUBCUTANEOUS at 11:07

## 2021-02-23 RX ADMIN — BUPIVACAINE HYDROCHLORIDE IN DEXTROSE 15 MG: 7.5 INJECTION, SOLUTION SUBARACHNOID at 08:55

## 2021-02-23 RX ADMIN — SODIUM CHLORIDE, POTASSIUM CHLORIDE, SODIUM LACTATE AND CALCIUM CHLORIDE: 600; 310; 30; 20 INJECTION, SOLUTION INTRAVENOUS at 12:40

## 2021-02-23 ASSESSMENT — ENCOUNTER SYMPTOMS: DYSRHYTHMIAS: 0

## 2021-02-23 ASSESSMENT — MIFFLIN-ST. JEOR: SCORE: 1753.75

## 2021-02-23 NOTE — PROGRESS NOTES
[unfilled]                                                                              [facility]      OUTPATIENT PHYSICAL THERAPY EVALUATION  PLAN OF TREATMENT FOR OUTPATIENT REHABILITATION  (COMPLETE FOR INITIAL CLAIMS ONLY)  Patient's Last Name, First Name, M.I.  YOB: 1950  Peter Hernandez                        Provider's Name  [facility] Medical Record No.  [id number]                               Onset Date:  02/23/21   Start of Care Date:  02/23/21      Type:     _X_PT   ___OT   ___SLP Medical Diagnosis:  s/p R MIKE                        PT Diagnosis:  Impaired gait   Visits from SOC:  1   _________________________________________________________________________________  Plan of Treatment/Functional Goals    Planned Interventions: bed mobility training, gait training, home exercise program, patient/family education, ROM (range of motion), stair training, strengthening, transfer training     Goals: See Physical Therapy Goals on Care Plan in [employer] electronic health record.    Therapy Frequency: 2x/day  Predicted Duration of Therapy Intervention: 2 days  _________________________________________________________________________________    I CERTIFY THE NEED FOR THESE SERVICES FURNISHED UNDER        THIS PLAN OF TREATMENT AND WHILE UNDER MY CARE     (Physician co-signature of this document indicates review and certification of the therapy plan).                Certification date from: 02/23/21, Certification date to: 02/24/21    Referring Physician: Gera Davison MD            Initial Assessment        See Physical Therapy evaluation dated 02/23/21 in Epic electronic health record.

## 2021-02-23 NOTE — PROGRESS NOTES
Med Scribe completed PTA meds and last doses.  ( original progress note by: Louis Wang )    Prior to Admission medications    Medication Sig Last Dose Taking? Auth Provider   acetaminophen (TYLENOL) 325 MG tablet Take 2 tablets (650 mg) by mouth every 4 hours as needed for other (mild pain)  Yes Gera Davison MD   albuterol (VENTOLIN HFA) 108 (90 Base) MCG/ACT inhaler Inhale 2 puffs into the lungs every 6 hours as needed for shortness of breath / dyspnea or wheezing 2/19/2021 at PRN Yes Willie Nam MD   amLODIPine (NORVASC) 5 MG tablet Take 1 tablet (5 mg) by mouth daily 2/23/2021 at 0530 Yes Willie Nam MD   Ascorbic Acid (VITAMIN C) 500 MG CAPS Take 500 mg by mouth 2 times daily (with meals)  2/22/2021 at PM Yes Reported, Patient   aspirin (ASA) 325 MG EC tablet Take 1 tablet (325 mg) by mouth daily  Yes Gera Davison MD   aspirin 81 MG tablet Take 1 tablet by mouth At Bedtime  2/18/2021 at HS Yes Willie Nam MD   hydrochlorothiazide (MICROZIDE) 12.5 MG capsule Take 1 capsule (12.5 mg) by mouth daily 2/22/2021 at AM Yes Willie Nam MD   hydrOXYzine (ATARAX) 10 MG tablet Take 1 tablet (10 mg) by mouth every 6 hours as needed for itching or anxiety (with pain, moderate pain)  Yes Gera Davison MD   ketoconazole (NIZORAL) 2 % external cream APPLY TOPICALLY DAILY  Patient taking differently: Apply topically daily as needed (to groin area) APPLY TOPICALLY DAILY over 3 weeks ago at PRN Yes Willie Nam MD   lisinopril (ZESTRIL) 10 MG tablet TAKE 1 TABLET BY MOUTH ONCE DAILY.  Patient taking differently: Take 10 mg by mouth every morning TAKE 1 TABLET BY MOUTH ONCE DAILY. 2/23/2021 at 0530 Yes Willie Nam MD   Multiple Vitamin (DAILY MULTIVITAMIN PO) Take 2 capsules by mouth every morning  2/22/2021 at AM Yes Reported, Patient   Naphazoline-Pheniramine (OPCON-A OP) Place 1 drop into both eyes daily as needed (cat allergies) 2/21/2021 at PRN Yes  Reported, Patient   naproxen (NAPROSYN) 500 MG tablet TAKE 1 TABLET BY MOUTH TWICE A DAY WITH MEALS  Patient taking differently: Take 500 mg by mouth every morning TAKE 1 TABLET BY MOUTH TWICE A DAY WITH MEALS 2/18/2021 at AM Yes Willie Nam MD   nitroGLYcerin (NITROSTAT) 0.4 MG sublingual tablet For chest pain place 1 tablet under the tongue every 5 minutes for 3 doses. If symptoms persist 5 minutes after 1st dose call 911. Never used yet. at PRN Yes Braulio Blanca MD   oxyCODONE (ROXICODONE) 5 MG tablet Take 1-2 tablets (5-10 mg) by mouth every 3 hours as needed for pain (Moderate to Severe)  Yes Gera Davison MD   Probiotic Product (PROBIOTIC-10 PO) Take 1 capsule by mouth every morning  2/22/2021 at AM Yes Reported, Patient   Psyllium (METAMUCIL FIBER PO) Take 1-2 teaspoonful by mouth daily as needed (mix with a glass of water and drink) 2/22/2021 at AM Yes Reported, Patient   QVAR REDIHALER 80 MCG/ACT inhaler Inhale 1 puff into the lungs 2 times daily Only in winter or Around cats See Admin Instructions Only in winter or Around cats  Patient taking differently: Inhale 1 puff into the lungs daily as needed Only in winter or Around cats See Admin Instructions Only in winter or Around cats 2/19/2021 at PRN Yes Willie Nam MD   rosuvastatin (CRESTOR) 20 MG tablet Take 1 tablet (20 mg) by mouth daily 2/22/2021 at HS Yes Willie Nam MD   senna-docusate (SENOKOT-S/PERICOLACE) 8.6-50 MG tablet Take 1-2 tablets by mouth 2 times daily Take while on oral narcotics to prevent or treat constipation.  Yes Gera Davison MD   Vitamin D, Cholecalciferol, 1000 units CAPS Take 3,000 Units by mouth every morning  2/22/2021 at AM Yes Reported, Patient   zinc gluconate 50 MG tablet Take 50 mg by mouth every morning  2/22/2021 at AM Yes Reported, Patient           PTA medications updated by Medication Scribe prior to surgery via phone call with patient        Comments:    Medication  history sources: Patient, Surescripts, H&P and Patient's home med list  Medication history source reliability: Good  Adherence assessment: N/A Not Observed    Significant changes made to the medication list:  Patient reports no longer taking the following meds (med scribe removed from PTA med list): cyclobenzaprine, Cialis      Additional medication history information:   Patient brought own home meds: Albuterol inhaler, Qvar inhaler        Prior to Admission medications    Medication Sig Last Dose Taking? Auth Provider   albuterol (VENTOLIN HFA) 108 (90 Base) MCG/ACT inhaler Inhale 2 puffs into the lungs every 6 hours as needed for shortness of breath / dyspnea or wheezing 2/19/2021 at PRN Yes Willie Nam MD   amLODIPine (NORVASC) 5 MG tablet Take 1 tablet (5 mg) by mouth daily  at AM Yes Willie Nam MD   Ascorbic Acid (VITAMIN C) 500 MG CAPS Take 500 mg by mouth 2 times daily (with meals)   at PM Yes Reported, Patient   aspirin 81 MG tablet Take 1 tablet by mouth At Bedtime  2/18/2021 at HS Yes Willie Nam MD   hydrochlorothiazide (MICROZIDE) 12.5 MG capsule Take 1 capsule (12.5 mg) by mouth daily 2/22/2021 at AM Yes Willie Nam MD   ketoconazole (NIZORAL) 2 % external cream APPLY TOPICALLY DAILY  Patient taking differently: Apply topically daily as needed (to groin area) APPLY TOPICALLY DAILY over 3 weeks ago at PRN Yes Willie Nam MD   lisinopril (ZESTRIL) 10 MG tablet TAKE 1 TABLET BY MOUTH ONCE DAILY.  Patient taking differently: Take 10 mg by mouth every morning TAKE 1 TABLET BY MOUTH ONCE DAILY.  at AM Yes Willie Nam MD   Multiple Vitamin (DAILY MULTIVITAMIN PO) Take 2 capsules by mouth every morning  2/22/2021 at AM Yes Reported, Patient   Naphazoline-Pheniramine (OPCON-A OP) Place 1 drop into both eyes daily as needed (cat allergies) 2/21/2021 at PRN Yes Reported, Patient   naproxen (NAPROSYN) 500 MG tablet TAKE 1 TABLET BY MOUTH TWICE A DAY WITH  MEALS  Patient taking differently: Take 500 mg by mouth every morning TAKE 1 TABLET BY MOUTH TWICE A DAY WITH MEALS 2/18/2021 at AM Yes Willie Nam MD   nitroGLYcerin (NITROSTAT) 0.4 MG sublingual tablet For chest pain place 1 tablet under the tongue every 5 minutes for 3 doses. If symptoms persist 5 minutes after 1st dose call 911. Never used yet. at PRN Yes Braulio Blanca MD   Probiotic Product (PROBIOTIC-10 PO) Take 1 capsule by mouth every morning  2/22/2021 at AM Yes Reported, Patient   Psyllium (METAMUCIL FIBER PO) Take 1-2 teaspoonful by mouth daily as needed (mix with a glass of water and drink) 2/22/2021 at AM Yes Reported, Patient   QVAR REDIHALER 80 MCG/ACT inhaler Inhale 1 puff into the lungs 2 times daily Only in winter or Around cats See Admin Instructions Only in winter or Around cats  Patient taking differently: Inhale 1 puff into the lungs daily as needed Only in winter or Around cats See Admin Instructions Only in winter or Around cats 2/19/2021 at PRN Yes Willie Nam MD   rosuvastatin (CRESTOR) 20 MG tablet Take 1 tablet (20 mg) by mouth daily  at HS Yes Willie Nam MD   Vitamin D, Cholecalciferol, 1000 units CAPS Take 3,000 Units by mouth every morning  2/22/2021 at AM Yes Reported, Patient   zinc gluconate 50 MG tablet Take 50 mg by mouth every morning  2/22/2021 at AM Yes Reported, Patient

## 2021-02-23 NOTE — PROGRESS NOTES
Patient vital signs are at baseline: Yes  Patient able to ambulate as they were prior to admission or with assist devices provided by therapies during their stay:  Yes  Patient MUST void prior to discharge:  No,  Reason:  Due to void still  Patient able to tolerate oral intake:  Yes  Pain has adequate pain control using Oral analgesics:  Yes     Pt is alert and oriented, still has slight numbness on his RLE. Right hip dressing CDI. Hemovac in placed draining bloody output. Due to void still, latest . Pt refused to be straight cath for now, he will try to void first. Dangled in bed, denies pain when asked.

## 2021-02-23 NOTE — ANESTHESIA CARE TRANSFER NOTE
Patient: Peter Hernandez II    Procedure(s):  RIGHT TOTAL HIP ARTHROPLASTY    Diagnosis: Primary osteoarthritis of right hip [M16.11]  Diagnosis Additional Information: No value filed.    Anesthesia Type:   Spinal     Note:    Oropharynx: oropharynx clear of all foreign objects  Level of Consciousness: awake  Oxygen Supplementation: face mask  Level of Supplemental Oxygen (L/min / FiO2): 6  Independent Airway: airway patency satisfactory and stable  Dentition: dentition unchanged  Vital Signs Stable: post-procedure vital signs reviewed and stable  Report to RN Given: handoff report given  Patient transferred to: PACU    Handoff Report: Identifed the Patient, Identified the Reponsible Provider, Reviewed the pertinent medical history, Discussed the surgical course, Reviewed Intra-OP anesthesia mangement and issues during anesthesia, Set expectations for post-procedure period and Allowed opportunity for questions and acknowledgement of understanding      Vitals: (Last set prior to Anesthesia Care Transfer)  CRNA VITALS  2/23/2021 1010 - 2/23/2021 1043      2/23/2021             Resp Rate (set):  10        Electronically Signed By: JEANNE Pike CRNA  February 23, 2021  10:43 AM

## 2021-02-23 NOTE — BRIEF OP NOTE
[facility]    Brief Operative Note    Pre-operative diagnosis: Primary osteoarthritis of right hip [M16.11]  Post-operative diagnosis Same as pre-operative diagnosis    Procedure: Procedure(s):  RIGHT TOTAL HIP ARTHROPLASTY  Surgeon: Surgeon(s) and Role:     * Gera Davison MD - Primary     * Makayla De Guzman PA-C - Assisting  Anesthesia: Spinal   Estimated blood loss: 200 ml  Drains: Hemovac  Specimens:   ID Type Source Tests Collected by Time Destination   1 : BONE/TISSUE RIGHT HIP Other (specify in comments) Other OR DOCUMENTATION ONLY Gera Davison MD 2/23/2021  9:40 AM      Findings:   None.  Complications: None.  Implants:   Implant Name Type Inv. Item Serial No.  Lot No. LRB No. Used Action   IMP SCR ACET SNN SPHERICAL HEAD 6.5X25MM [id number] Metallic Hardware/[place] IMP SCR ACET SNN SPHERICAL HEAD 6.5X25MM [id number]  [employer]  [id number] Right 1 Implanted   IMP SHELL SNR ACET R3 3H 56MM [id number] Total Joint Component/Insert IMP SHELL SNR ACET R3 3H 56MM [id number]  Eldena  [id number] Right 1 Implanted   IMP SCR ACET SNN SPHERICAL HEAD 6.5X25MM [id number] Metallic Hardware/[place] IMP SCR ACET SNN SPHERICAL HEAD 6.5X25MM [id number]  SMITH  [id number] Right 1 Implanted   IMP STEM SNN HIGH OFFSET SZ 7 [id number] Total Joint Component/Insert IMP STEM SNN HIGH OFFSET SZ 7 [id number]  Eldena  [id number] Right 1 Implanted   40MM ID 56MM OD LATERALIZED +4 ACETABULARLINER    [employer] [id number] Right 1 Implanted   IMP FEMORAL SLEEVE S&N LONG MOD +8MM NECK TI [id number] Total Joint Component/Insert IMP FEMORAL SLEEVE S&N LONG MOD +8MM NECK TI [id number]  Eldena  [id number] Right 1 Implanted   HEAD OX MODULAR 40MM Total Joint Component/Insert HEAD OX MODULAR 40MM  SMITH  [id number] Right 1 Implanted

## 2021-02-23 NOTE — PROGRESS NOTES
02/23/21 1700   Quick Adds   Quick Adds Certification   Type of Visit Initial PT Evaluation   Living Environment   People in home alone   Current Living Arrangements   ([facility])   Home Accessibility stairs to enter home   Number of Stairs, Main Entrance 2   Stair Railings, Main Entrance none   Transportation Anticipated car, drives self;family or friend will provide   Living Environment Comments Pt planning to have sister and sister-in law provide care for first ~5 days upon discharge; 2 JOHS, all needs met on main level, has comfort height toilets, walk-in shower with chair    Self-Care   Usual Activity Tolerance good   Current Activity Tolerance moderate   Regular Exercise No   Equipment Currently Used at Home none   Activity/Exercise/Self-Care Comment IND without AD for mobility and ADLs; owns financial planning business, 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   Disability/Function   Walking or Climbing Stairs Difficulty no   Dressing/Bathing Difficulty no   Toileting issues no   Doing Errands Independently Difficulty (such as shopping) no   Fall history within last six months no   Change in Functional Status Since Onset of Current Illness/Injury yes   General Information   Onset of Illness/Injury or Date of Surgery 02/23/21   Referring Physician Gera Davison MD   Patient/Family Therapy Goals Statement (PT) Home with home therapy per pt   Pertinent History of Current Problem (include personal factors and/or comorbidities that impact the POC) Pt is a 71yo male seen POD#0 s/p R MIKE   Existing Precautions/Restrictions fall;no hip IR;no hip ADD past midline;90 degree hip flexion;no pivoting or twisting   Weight-Bearing Status - RLE weight-bearing as tolerated   General Observations Pt resting in bed with hip abductor pillow in place, hemovac patent   Cognition   Orientation Status (Cognition) oriented x 4   Affect/Mental Status (Cognition) WFL   Follows Commands (Cognition)  WFL   Pain Assessment   Patient Currently in Pain Yes, see Vital Sign flowsheet   Integumentary/Edema   Integumentary/Edema Comments R hip hemovac patent   Posture    Posture Forward head position   Range of Motion (ROM)   ROM Quick Adds ROM deficits secondary to surgical procedure;ROM deficits secondary to pain;ROM deficits secondary to swelling;ROM deficits secondary to weakness   ROM Comment R hip limited by above and precautions otherwise BLE WFL   Strength   Manual Muscle Testing Quick Adds Deficits observed during functional mobility   Strength Comments functional weakness d/t post-op weakness and pain, Tee for SAQ and heel slide on R   Bed Mobility   Comment (Bed Mobility) Tee supine>Sit   Transfers   Transfer Safety Comments modA sit>stand to FWW   Gait/Stairs (Locomotion)   Comment (Gait/Stairs) Tee 5' with FWW, step to gait pattern, heavily WB through UEs   Balance   Balance Comments decreased balance without BUE support, fairly steady with AD   Sensory Examination   Sensory Perception Comments reports some R foot numbness but improving since surgery, appears grossly intact to light touch   Clinical Impression   Criteria for Skilled Therapeutic Intervention yes, treatment indicated   PT Diagnosis (PT) Impaired gait   Influenced by the following impairments Pain, weakness, decreased ROM, decreased balance, hip precautions   Functional limitations due to impairments Decreased safety and IND with functional transfers, gait, stairs   Clinical Presentation Stable/Uncomplicated   Clinical Decision Making (Complexity) low complexity   Therapy Frequency (PT) 2x/day   Predicted Duration of Therapy Intervention (days/wks) 2 days   Planned Therapy Interventions (PT) bed mobility training;gait training;home exercise program;patient/family education;ROM (range of motion);stair training;strengthening;transfer training   Anticipated Equipment Needs at Discharge (PT) walker, rolling   Risk & Benefits of therapy have  been explained evaluation/treatment results reviewed;care plan/treatment goals reviewed;risks/benefits reviewed;current/potential barriers reviewed;participants voiced agreement with care plan;participants included;patient   PT Discharge Planning    PT Rationale for DC Rec Anticipate pt will be SBA for bed mobility, transfers, ambulation with FWW, and Ax1 on stairs to enter   Therapy Certification   Start of care date 02/23/21   Certification date from 02/23/21   Certification date to 02/24/21   Medical Diagnosis s/p R MIKE   Total Evaluation Time   Total Evaluation Time (Minutes) 10

## 2021-02-23 NOTE — CONSULTS
Consult Date:  02/23/2021      PRIMARY CARE PHYSICIAN:  Willie Nam MD      REQUESTING PHYSICIAN:  Gera Davison MD, Orthopedic Surgery      REASON FOR CONSULTATION:  Postoperative medical management of chronic comorbidities including hypertension, CKD and CAD.      HISTORY OF PRESENT ILLNESS:  Peter Hernandez is a pleasant 70-year-old male with a past medical history of coronary artery disease with history of stent, chronic kidney disease, hypertension, hyperlipidemia, who underwent a planned right total hip arthroplasty on 02/23/2021.  This was for treatment of advanced right hip osteoarthritis that had failed conservative management.  Procedure was performed under spinal anesthesia with periarticular block.  EBL reported as 200 mL.  No immediate complications mentioned.  The patient was given Ancef perioperatively for surgical prophylaxis.      The patient is resting comfortably in bed on my arrival.  He denies any recent illness, fevers, chills, headache, lightheadedness, chest pain, shortness of breath, abdominal pain, nausea, vomiting, diarrhea, dysuria, focal weakness.  He takes daily aspirin 325 mg, which was held prior to surgery.  He is also on amlodipine 5 mg daily and lisinopril 10 mg daily for blood pressure control.  He takes Crestor.  He is also prescribed an albuterol and QVAR inhaler, which he only uses on an as-needed basis.  He reports no new concerns.  His vital signs are currently stable with /61, heart rate 63, temperature 97.4 and oxygen saturation 97% on room air.  Limited lab work shows a potassium 3.6.  Recent COVID-19 screening is negative. He saw his outpatient Cardiologist, Dr. Blanca preoperatively,  He is asymptomatic with respect to coronary artery disease, nuclear stress testing did show a small area of infarction in the mid to basal inferolateral wall with mild polly-infarct ischemia.  Could also be artifact per cardiology.  He was deemed able to proceed with surgery.      PAST MEDICAL HISTORY:   1.  Arthritis.   2.  Coronary artery disease, status post stent in .   3.  Chronic kidney disease.   4.  GERD.   5.  Hypertension.   6.  Hyperlipidemia.      PAST SURGICAL HISTORY:   1.  Rotator cuff repair in  with Dr. Butler.   2.  Coronary stent placement in .   3.  Pilonidal cystectomy.      FAMILY HISTORY:  Mother with CVA.  Father with CAD and bone cancer.  The patient has a brother who has coronary artery disease and was  at age 62.      SOCIAL HISTORY:  The patient is a nonsmoker.  He drinks alcohol occasionally.  No illicit drug use.      PRIOR TO ADMISSION MEDICATIONS:    Medications Prior to Admission   Medication Sig Dispense Refill Last Dose     albuterol (VENTOLIN HFA) 108 (90 Base) MCG/ACT inhaler Inhale 2 puffs into the lungs every 6 hours as needed for shortness of breath / dyspnea or wheezing 3 Inhaler 3 2021 at PRN     amLODIPine (NORVASC) 5 MG tablet Take 1 tablet (5 mg) by mouth daily 90 tablet 3 2021 at 0530     Ascorbic Acid (VITAMIN C) 500 MG CAPS Take 500 mg by mouth 2 times daily (with meals)    2021 at PM     aspirin 81 MG tablet Take 1 tablet by mouth At Bedtime   3 2021 at HS     hydrochlorothiazide (MICROZIDE) 12.5 MG capsule Take 1 capsule (12.5 mg) by mouth daily 90 capsule 3 2021 at AM     ketoconazole (NIZORAL) 2 % external cream APPLY TOPICALLY DAILY (Patient taking differently: Apply topically daily as needed (to groin area) APPLY TOPICALLY DAILY) 60 g 0 over 3 weeks ago at PRN     lisinopril (ZESTRIL) 10 MG tablet TAKE 1 TABLET BY MOUTH ONCE DAILY. (Patient taking differently: Take 10 mg by mouth every morning TAKE 1 TABLET BY MOUTH ONCE DAILY.) 90 tablet 3 2021 at 0530     Multiple Vitamin (DAILY MULTIVITAMIN PO) Take 2 capsules by mouth every morning    2021 at AM     Naphazoline-Pheniramine (OPCON-A OP) Place 1 drop into both eyes daily as needed (cat allergies)   2021 at PRN     naproxen  (NAPROSYN) 500 MG tablet TAKE 1 TABLET BY MOUTH TWICE A DAY WITH MEALS (Patient taking differently: Take 500 mg by mouth every morning TAKE 1 TABLET BY MOUTH TWICE A DAY WITH MEALS) 40 tablet 1 2/18/2021 at AM     nitroGLYcerin (NITROSTAT) 0.4 MG sublingual tablet For chest pain place 1 tablet under the tongue every 5 minutes for 3 doses. If symptoms persist 5 minutes after 1st dose call 911. 25 tablet 3 Never used yet. at PRN     Probiotic Product (PROBIOTIC-10 PO) Take 1 capsule by mouth every morning    2/22/2021 at AM     Psyllium (METAMUCIL FIBER PO) Take 1-2 teaspoonful by mouth daily as needed (mix with a glass of water and drink)   2/22/2021 at AM     QVAR REDIHALER 80 MCG/ACT inhaler Inhale 1 puff into the lungs 2 times daily Only in winter or Around cats See Admin Instructions Only in winter or Around cats (Patient taking differently: Inhale 1 puff into the lungs daily as needed Only in winter or Around cats See Admin Instructions Only in winter or Around cats) 1 Inhaler 11 2/19/2021 at PRN     rosuvastatin (CRESTOR) 20 MG tablet Take 1 tablet (20 mg) by mouth daily 90 tablet 3 2/22/2021 at HS     Vitamin D, Cholecalciferol, 1000 units CAPS Take 3,000 Units by mouth every morning    2/22/2021 at AM     zinc gluconate 50 MG tablet Take 50 mg by mouth every morning    2/22/2021 at AM     ALLERGIES:  SIMVASTATIN.      REVIEW OF SYSTEMS:  A complete 10-point review of systems was performed and is negative other than the items mentioned in above HPI.      PHYSICAL EXAMINATION:   VITAL SIGNS:  Blood pressure 129/61, heart rate 63 beats per minute, temperature 97.4, respiratory rate 18, oxygen saturation 94% on room air.   GENERAL:  The patient is alert, oriented to person, place and situation, cooperative, sitting up in bed in no apparent distress.   EYES:  Pupils equal and round.  Extraocular movements intact.   HEENT:  Head normocephalic, atraumatic.  Throat, mucosa and tongue appear moist.   NECK:  Supple.    CARDIOVASCULAR:  Heart regular rate and rhythm, no murmurs, rubs or gallops.  Distal pulses are intact.   PULMONARY:  Lungs are clear to auscultation bilaterally, no crackles, wheezes or rhonchi.  Breathing is nonlabored   GASTROINTESTINAL:  Abdomen soft, nontender, nondistended with normoactive bowel sounds.   MUSCULOSKELETAL:  The patient moves all 4 extremities.  His right hip is postoperative.   SKIN:  Warm, dry, nondiaphoretic.  No erythema seen.   NEUROLOGIC:  Alert, cranial nerves II-XII are grossly intact.  Motor function is intact.  No focal deficits.   PSYCHIATRIC:  Normal mood and affect.      LABORATORY DATA:  Potassium 3.6.  Preoperative COVID-19 PCR screening is negative.  Imaging is as reviewed in Epic.      ASSESSMENT AND PLAN:  Peter Saldana is a pleasant 70-year-old male with a past medical history of hypertension, hyperlipidemia, coronary artery disease, status post stent and chronic kidney disease who presents to the hospital for elective right total hip arthroplasty.  Procedure was performed on 02/23/2021 without any reported complications.  Hospitalist Service was consulted for routine postoperative medical co-management.     1.  Severe osteoarthritis, status post right total hip arthroplasty 02/23/2021:  The patient seems to be doing well in this regard.  Pain is controlled.  Vital signs are stable.  The patient received spinal anesthesia with polly-articular block.   mL.  Ancef for surgical prophylaxis.  Full-dose aspirin ordered for deep venous thrombosis prophylaxis per primary service.  We will defer routine postoperative cares including pain management to Orthopedic Surgery.     2.  Coronary artery disease with history of coronary stent in 2002:  He saw his outpatient Cardiologist, Dr. Blanca preoperatively,  He is asymptomatic with respect to coronary artery disease, nuclear stress testing did show a small area of infarction in the mid to basal inferolateral wall with mild  polly-infarct ischemia.  Could also be artifact per cardiology.  He was deemed able to proceed with surgery.Patient denies chest currently.  He takes a full-dose aspirin daily at baseline, which is resumed per Ortho.  Continue lisinopril and amlodipine.  Continue Crestor.     3.  Hypertension:  Resume prior to admission lisinopril 10 mg daily and amlodipine 5 mg daily with holding parameters.  Hold prior to admission hydrochlorothiazide 12.5 mg daily.  Check BMP in a.m.     4.  Hyperlipidemia:  Resume prior to admission Crestor.     5.  Asthma:  The patient is currently breathing comfortably.  No wheezes or recent exacerbation.  PTA inhalers can be resumed as needed, he uses this on a p.r.n. basis as an outpatient.     6.  Chronic kidney disease:  We will check a creatinine and repeat BMP in a.m.  Avoid nephrotoxins as able.     7.  Deep DVT prophylaxis:  Aspirin 325 mg daily per Orthopedic Surgery.     8.  CODE STATUS:  Full code address.      The patient was discussed with Dr. Carlyle Medina of the [facility] Hospitalist Service.  He is in agreement with my assessment and plan of care.      The Hospitalist Service would like to thank Dr. Davison for consulting us.  We will continue to follow along.         CARLYLE MEDINA MD       As dictated by OMAR NI PA-C            D: 2021   T: 2021   MT: ANTHONY      Name:     NATALY CONTRERAS   MRN:      [id number]        Account:       [id number]   :      1950           Consult Date:  2021      Document: [id number]       cc: Gera Davison MD

## 2021-02-23 NOTE — ANESTHESIA PROCEDURE NOTES
Pre-Procedure   Staff -   Anesthesiologist:  Eric Beltre MD  Performed By: anesthesiologist  Location: OR  Pre-Anesthestic Checklist: patient identified, IV checked, risks and benefits discussed, informed consent, monitors and equipment checked, pre-op evaluation and at physician/surgeon's request  Timeout:  Correct Patient: Yes   Correct Procedure: Yes   Correct Site: Yes   Correct Position: Yes   Correct Laterality: N/A   Site Marked: N/A    Procedure Documentation  Procedure: intrathecal  Patient Position:sitting  Patient Prep/Sterile Barriers: sterile gloves, mask, Betadine, patient draped  Insertion Site: L3-4. (midline approach).  Spinal Needle (gauge): 24   Spinal/LP Needle Length (inches): 3.5  Spinal Needle Type: Ashly-MarxIntroducer used  Introducer: 20 G  # of attempts: 1 and # of redirects: : 0.    Assessment/Narrative      Paresthesias: No.  CSF fluid: clear.  Comments:  Injected 15mg Bupivacaine 0.75% + dextrose  Patient tolerated the procedure well and without complications.  Patient laid flat immediately.

## 2021-02-23 NOTE — OP NOTE
Procedure Date: 02/23/2021      PREOPERATIVE DIAGNOSIS:  Right hip advanced osteoarthritis.      POSTOPERATIVE DIAGNOSIS:  Right hip advanced osteoarthritis.      PROCEDURE:  Right total hip arthroplasty.      SURGEON:  Gera Davison MD      ASSISTANT:  Makayla De Guzman, MSN, PA-C      ANESTHESIA:  Spinal, IV sedation, periarticular block.      ESTIMATED BLOOD LOSS:  200 mL      IMPLANTS:  [employer]hew:   1.  Size 56 R3 uncemented acetabular component.   2.  25 mm acetabular shell screw x 2.   3.  +4 offset 0-degree highly crosslink polyethylene liner for 56 cup and 40 head.   4.  40 mm Oxinium femoral head.   5.  +8 inner head adapter.   6.  Size 7 high offset Anthology uncemented femoral stem.      DESCRIPTION OF PROCEDURE:  Reuben is brought to the operating room on 02/23/2021 for right total hip arthroplasty.  He was seen the day of surgery in the preoperative holding area.  Right hip marked as the correct operative site.  He received a dose of IV antibiotic within 30 minutes prior to surgical incision.  Post-surgical antibiotic and DVT prophylaxis are indicated and will be prescribed.        Skilled assistant was used for positioning, draping, retraction, closure, dressing application.  He received a dose of tranexamic acid before coming to the operating room.      The patient was brought to the operating room and placed under a spinal anesthetic with IV sedation.  He was positioned in lateral decubitus with the right hip up.  Careful assessment of preoperative limb lengths was performed.  The right lower extremity was prepped and draped in the standard sterile fashion and preoperative timeout was taken.        I made a posterolateral surgical incision over the right hip.  Dissection was carried through skin, subcutaneous tissue and deep fascia.  Deep fascia was incised longitudinally and posterior hip approach was carried out including a T capsulotomy.  The femoral head was dislocated from the  acetabulum and the femoral neck was cut 5 mm proximal to the lesser trochanter.  Femoral head was removed and deep retractors were used to expose the acetabulum.  Pulvinar, labrum and osteophytes debrided.  I reamed the acetabulum starting with a 46 extending up to a 57.  I implanted a size 56 R3 uncemented acetabular component into the prepared acetabulum in a position of 45 degrees of abduction and 20 degrees of anteversion.  I placed two 25 mm acetabular shell screws into 2 separate superior shell screw holes.  Trial liner placed.  Femur was then addressed.  Box osteotome, canal finder, lateralizer and sequential broaching performed for the Anthology stem.  I broached up to a 7.  With the size 7 broach seated, I trialed and selected a high offset modular trial neck and a +8, 40 mm trial head.  With this combination of trial implants and with the trial prosthesis reduced, I was pleased with length, stability and offset.  Trial components were removed.  Jet lavage irrigation performed.        I implanted the +4 offset 0-degree highly crosslink polyethylene liner for the 56 cup.  I implanted the size 7 high offset Anthology uncemented femoral stem.  I implanted the 40 mm Oxinium femoral head with the +8 inner head adapter onto the dried Ivy taper.  Prosthesis reduced.  Length and stability again confirmed to be excellent.  Betadine wash performed.  Jet lavage irrigation performed.        Posterior capsule and piriformis tendon repaired.  Deep fascia closed over a medium Hemovac drain.  Superficial soft tissues irrigated and closed in layers.  Sterile dressing applied.  The patient was brought to the recovery area in stable condition.  Needle and lap counts were correct at the end of the case.  There were no known complications.         MILTON MCFARLANE MD             D: 2021   T: 2021   MT: ANTHONY      Name:     NATALY CONTRERAS   MRN:      [id number]        Account:        [id number]   :       1950           Procedure Date: 02/23/2021      Document: [id number]

## 2021-02-23 NOTE — ANESTHESIA PREPROCEDURE EVALUATION
Anesthesia Pre-Procedure Evaluation    Patient: Peter Hernandez II   MRN: [id number] : 1950        Preoperative Diagnosis: Primary osteoarthritis of right hip [M16.11]   Procedure : Procedure(s):  ARTHROPLASTY, HIP, TOTAL     Past Medical History:   Diagnosis Date     ACP (advance care planning)     will bring copy in      Arthritis      Bruit      CAD (coronary artery disease) 2002    a stent     CKD (chronic kidney disease)      Esophageal reflux 2013     Family history of ischemic heart disease      Hyperlipidaemia      Hypertension       Past Surgical History:   Procedure Laterality Date     CYSTECTOMY PILONIDAL       HEART CATH, ANGIOPLASTY  10/4/02    3.0 X 8 mm Velocity stent     ROTATOR CUFF REPAIR RT/LT      Dr. Butler      Allergies   Allergen Reactions     Cats      Simvastatin      achey      Social History     Tobacco Use     Smoking status: Never Smoker     Smokeless tobacco: Never Used   Substance Use Topics     Alcohol use: Yes     Alcohol/week: 0.8 standard drinks     Types: 1 Standard drinks or equivalent per week     Comment: occasionally      Wt Readings from Last 1 Encounters:   21 93 kg (205 lb)        Anesthesia Evaluation            ROS/MED HX  ENT/Pulmonary:     (+) Moderate Persistent, asthma     Neurologic:  - neg neurologic ROS     Cardiovascular: Comment: The nuclear stress test showed a small area of nontransmural infarction in the basal to mid inferolateral segment of the left ventricle with a mild degree of polly-infarct ischemia. This would be regarded as being a low-risk study    (+) Dyslipidemia hypertension--CAD --stent-. 1  (-) taking anticoagulants/antiplatelets, CHF and arrhythmias   METS/Exercise Tolerance:     Hematologic:       Musculoskeletal:   (+) arthritis,     GI/Hepatic:     (+) GERD,     Renal/Genitourinary:     (+) renal disease, type: CRI,     Endo:       Psychiatric/Substance Use:       Infectious Disease:       Malignancy:        Other:            Physical Exam    Airway        Mallampati: I   TM distance: > 3 FB   Neck ROM: full   Mouth opening: > 3 cm    Respiratory Devices and Support         Dental  no notable dental history         Cardiovascular   cardiovascular exam normal       Rhythm and rate: regular     Pulmonary   pulmonary exam normal                OUTSIDE LABS:  CBC:   Lab Results   Component Value Date    WBC 12.2 (A) 02/11/2021    WBC 11.1 (A) 07/01/2020    HGB 14.5 02/11/2021    HGB 14.9 07/01/2020    HCT 44.8 02/11/2021    HCT 43.7 07/01/2020     02/11/2021     07/01/2020     BMP:   Lab Results   Component Value Date     02/11/2021     07/01/2020    POTASSIUM 4.1 02/11/2021    POTASSIUM 3.8 07/01/2020    CHLORIDE 97 02/11/2021    CHLORIDE 98 07/01/2020    CO2 29 04/17/2019    CO2 29 04/20/2018    BUN 20 02/11/2021    BUN 22 02/11/2021    CR 0.93 02/11/2021    CR 1.07 07/01/2020    GLC 90 02/11/2021    GLC 88 07/01/2020     COAGS: No results found for: PTT, INR, FIBR  POC: No results found for: BGM, HCG, HCGS  HEPATIC:   Lab Results   Component Value Date    ALBUMIN 4.7 07/01/2020    PROTTOTAL 7.1 07/01/2020    ALT 25 07/01/2020    AST 23 07/01/2020    ALKPHOS 72 07/01/2020    BILITOTAL 0.3 07/01/2020     OTHER:   Lab Results   Component Value Date    A1C 5.8 (H) 04/27/2018    MARLENE 9.2 02/11/2021       Anesthesia Plan    ASA Status:  3   NPO Status:  NPO Appropriate    Anesthesia Type: Spinal.              Consents    Anesthesia Plan(s) and associated risks, benefits, and realistic alternatives discussed. Questions answered and patient/representative(s) expressed understanding.     - Discussed with:  Patient         Postoperative Care    Pain management: IV analgesics, Multi-modal analgesia.   PONV prophylaxis: Ondansetron (or other 5HT-3)     Comments:                Eric Beltre MD

## 2021-02-24 ENCOUNTER — APPOINTMENT (OUTPATIENT)
Dept: PHYSICAL THERAPY | Facility: CLINIC | Age: 71
End: 2021-02-24
Attending: ORTHOPAEDIC SURGERY
Payer: COMMERCIAL

## 2021-02-24 ENCOUNTER — APPOINTMENT (OUTPATIENT)
Dept: OCCUPATIONAL THERAPY | Facility: CLINIC | Age: 71
End: 2021-02-24
Attending: ORTHOPAEDIC SURGERY
Payer: COMMERCIAL

## 2021-02-24 VITALS
WEIGHT: 205 LBS | TEMPERATURE: 97.7 F | RESPIRATION RATE: 16 BRPM | DIASTOLIC BLOOD PRESSURE: 68 MMHG | SYSTOLIC BLOOD PRESSURE: 134 MMHG | BODY MASS INDEX: 27.17 KG/M2 | OXYGEN SATURATION: 97 % | HEART RATE: 59 BPM | HEIGHT: 73 IN

## 2021-02-24 LAB
ANION GAP SERPL CALCULATED.3IONS-SCNC: 4 MMOL/L (ref 3–14)
BUN SERPL-MCNC: 19 MG/DL (ref 7–30)
CALCIUM SERPL-MCNC: 8.6 MG/DL (ref 8.5–10.1)
CHLORIDE SERPL-SCNC: 106 MMOL/L (ref 94–109)
CO2 SERPL-SCNC: 28 MMOL/L (ref 20–32)
CREAT SERPL-MCNC: 0.93 MG/DL (ref 0.66–1.25)
GFR SERPL CREATININE-BSD FRML MDRD: 84 ML/MIN/{1.73_M2}
GLUCOSE SERPL-MCNC: 108 MG/DL (ref 70–99)
HGB BLD-MCNC: 11.9 G/DL (ref 13.3–17.7)
POTASSIUM SERPL-SCNC: 4 MMOL/L (ref 3.4–5.3)
SODIUM SERPL-SCNC: 138 MMOL/L (ref 133–144)

## 2021-02-24 PROCEDURE — 85018 HEMOGLOBIN: CPT | Performed by: ORTHOPAEDIC SURGERY

## 2021-02-24 PROCEDURE — 97116 GAIT TRAINING THERAPY: CPT | Mod: GP | Performed by: PHYSICAL THERAPY ASSISTANT

## 2021-02-24 PROCEDURE — 250N000013 HC RX MED GY IP 250 OP 250 PS 637: Performed by: ORTHOPAEDIC SURGERY

## 2021-02-24 PROCEDURE — 99225 PR SUBSEQUENT OBSERVATION CARE,LEVEL II: CPT | Performed by: INTERNAL MEDICINE

## 2021-02-24 PROCEDURE — 80048 BASIC METABOLIC PNL TOTAL CA: CPT | Performed by: ORTHOPAEDIC SURGERY

## 2021-02-24 PROCEDURE — 97535 SELF CARE MNGMENT TRAINING: CPT | Mod: GO,59

## 2021-02-24 PROCEDURE — 250N000013 HC RX MED GY IP 250 OP 250 PS 637: Performed by: PHYSICIAN ASSISTANT

## 2021-02-24 PROCEDURE — 97530 THERAPEUTIC ACTIVITIES: CPT | Mod: GO

## 2021-02-24 PROCEDURE — 97165 OT EVAL LOW COMPLEX 30 MIN: CPT | Mod: GO

## 2021-02-24 PROCEDURE — 97530 THERAPEUTIC ACTIVITIES: CPT | Mod: GP | Performed by: PHYSICAL THERAPY ASSISTANT

## 2021-02-24 PROCEDURE — 97110 THERAPEUTIC EXERCISES: CPT | Mod: GP | Performed by: PHYSICAL THERAPY ASSISTANT

## 2021-02-24 PROCEDURE — 250N000011 HC RX IP 250 OP 636: Performed by: ORTHOPAEDIC SURGERY

## 2021-02-24 PROCEDURE — 36415 COLL VENOUS BLD VENIPUNCTURE: CPT | Performed by: ORTHOPAEDIC SURGERY

## 2021-02-24 RX ADMIN — DOCUSATE SODIUM 100 MG: 100 CAPSULE, LIQUID FILLED ORAL at 09:15

## 2021-02-24 RX ADMIN — OXYCODONE HYDROCHLORIDE 10 MG: 5 TABLET ORAL at 11:53

## 2021-02-24 RX ADMIN — DOCUSATE SODIUM 50 MG AND SENNOSIDES 8.6 MG 1 TABLET: 8.6; 5 TABLET, FILM COATED ORAL at 09:16

## 2021-02-24 RX ADMIN — ASPIRIN 325 MG: 325 TABLET, COATED ORAL at 09:15

## 2021-02-24 RX ADMIN — OXYCODONE HYDROCHLORIDE 10 MG: 5 TABLET ORAL at 07:56

## 2021-02-24 RX ADMIN — POLYETHYLENE GLYCOL 3350 17 G: 17 POWDER, FOR SOLUTION ORAL at 09:16

## 2021-02-24 RX ADMIN — HYDROXYZINE HYDROCHLORIDE 10 MG: 10 TABLET, FILM COATED ORAL at 02:18

## 2021-02-24 RX ADMIN — AMLODIPINE BESYLATE 5 MG: 5 TABLET ORAL at 09:15

## 2021-02-24 RX ADMIN — OXYCODONE HYDROCHLORIDE 5 MG: 5 TABLET ORAL at 02:18

## 2021-02-24 RX ADMIN — CEFAZOLIN SODIUM 2 G: 2 INJECTION, SOLUTION INTRAVENOUS at 00:35

## 2021-02-24 RX ADMIN — ROSUVASTATIN CALCIUM 20 MG: 20 TABLET, FILM COATED ORAL at 09:16

## 2021-02-24 RX ADMIN — LISINOPRIL 10 MG: 10 TABLET ORAL at 09:16

## 2021-02-24 RX ADMIN — HYDROXYZINE HYDROCHLORIDE 10 MG: 10 TABLET, FILM COATED ORAL at 08:13

## 2021-02-24 RX ADMIN — ACETAMINOPHEN 975 MG: 325 TABLET, FILM COATED ORAL at 05:05

## 2021-02-24 ASSESSMENT — ACTIVITIES OF DAILY LIVING (ADL): PREVIOUS_RESPONSIBILITIES: MEAL PREP;HOUSEKEEPING;LAUNDRY;SHOPPING;MEDICATION MANAGEMENT;FINANCES;DRIVING;WORK

## 2021-02-24 NOTE — PROGRESS NOTES
02/24/21 0800   Quick Adds   Quick Adds Certification   Type of Visit Initial Occupational Therapy Evaluation   Living Environment   People in home alone   Current Living Arrangements other (see comments)  (town home)   Living Environment Comments 2 JOSH then needs met on main level. walk in shower, grab bar, shower chair   Self-Care   Usual Activity Tolerance good   Current Activity Tolerance fair   Equipment Currently Used at Home none   Disability/Function   Hearing Difficulty or Deaf no   Wear Glasses or Blind yes   Vision Management glasses   Concentrating, Remembering or Making Decisions Difficulty no   Difficulty Communicating no   Difficulty Eating/Swallowing no   Walking or Climbing Stairs Difficulty yes   Dressing/Bathing Difficulty no   Toileting issues no   Doing Errands Independently Difficulty (such as shopping) no   Fall history within last six months no   Change in Functional Status Since Onset of Current Illness/Injury yes   General Information   Onset of Illness/Injury or Date of Surgery 02/23/21   Referring Physician Gera Davison MD   Patient/Family Therapy Goal Statement (OT) home with assist of family   Additional Occupational Profile Info/Pertinent History of Current Problem Pt is a 70 y.o. male admitted for THR- RIGHT   Performance Patterns (Routines, Roles, Habits) works from home   Existing Precautions/Restrictions fall;no pivoting or twisting;90 degree hip flexion;no hip ADD past midline   Left Upper Extremity (Weight-bearing Status) full weight-bearing (FWB)   Right Upper Extremity (Weight-bearing Status) full weight-bearing (FWB)   Left Lower Extremity (Weight-bearing Status) full weight-bearing (FWB)   Right Lower Extremity (Weight-bearing Status) weight-bearing as tolerated (WBAT)   Cognitive Status Examination   Orientation Status orientation to person, place and time   Affect/Mental Status (Cognitive) WFL   Follows Commands repetition of directions required   Visual Perception    Visual Impairment/Limitations WFL   Sensory   Sensory Quick Adds Pain   Pain Assessment   Patient Currently in Pain Yes, see Vital Sign flowsheet   Range of Motion Comprehensive   General Range of Motion bilateral upper extremity ROM WFL   Strength Comprehensive (MMT)   General Manual Muscle Testing (MMT) Assessment no strength deficits identified   Coordination   Upper Extremity Coordination No deficits were identified   Bed Mobility   Bed Mobility supine-sit;sit-supine   Supine-Sit [place] (Bed Mobility) contact guard   Sit-Supine [place] (Bed Mobility) contact guard   Assistive Device (Bed Mobility) bed rails   Transfers   Transfers sit-stand transfer;toilet transfer   Sit-Stand Transfer   Sit-Stand [place] (Transfers) moderate assist (50% patient effort)   Assistive Device (Sit-Stand Transfers) walker, front-wheeled   Toilet Transfer   Type (Toilet Transfer) sit-stand;stand-sit   [place] Level (Toilet Transfer) moderate assist (50% patient effort)   Assistive Device (Toilet Transfer) commode chair   Activities of Daily Living   BADL Assessment/Intervention lower body dressing;toileting   Lower Body Dressing Assessment/Training   [place] Level (Lower Body Dressing) dependent (less than 25% patient effort)   Toileting   [place] Level (Toileting) contact guard assist   Assistive Devices (Toileting) raised toilet seat;grab bar, toilet   Position (Toileting) unsupported sitting   Instrumental Activities of Daily Living (IADL)   Previous Responsibilities meal prep;housekeeping;laundry;shopping;medication management;finances;driving;work   Clinical Impression   Criteria for Skilled Therapeutic Interventions Met (OT) yes;skilled treatment is necessary   OT Diagnosis impaired I/ADL independence   OT Problem List-Impairments impacting ADL problems related to;activity tolerance impaired;balance;mobility;strength;pain;post-surgical precautions   ADL comments/analysis dressing, bathing,  toileting, driving, home mangement, meal prep   Assessment of Occupational Performance 3-5 Performance Deficits   Identified Performance Deficits decreased strength, impiared functional mobility, pain, surgical precautions   Planned Therapy Interventions (OT) ADL retraining;IADL retraining;strengthening;transfer training   Intervention Comments AE/DME education, safety with functional mobility and transfers, education on surgical precautions with ALD's   Clinical Decision Making Complexity (OT) low complexity   Therapy Frequency (OT) 1x eval and treat   Predicted Duration of Therapy 1   Anticipated Equipment Needs Upon Discharge (OT) reacher;shower chair;other (see comments)  (long handled sponge, sock aid)   Risk & Benefits of therapy have been explained evaluation/treatment results reviewed;care plan/treatment goals reviewed;risks/benefits reviewed;current/potential barriers reviewed;participants voiced agreement with care plan;participants included;patient   OT Discharge Planning    OT Discharge Recommendation (DC Rec) Home with assist   OT Rationale for DC Rec pt below baseline for ADL independence. anticipate with IP OT pt will progress to level safe to d/c home with assist for transfers, home mananagement, driving, meal prep.    Therapy Certification   Start of Care Date 02/23/21   Certification date from 02/24/21   Certification date to 02/24/21   Medical Diagnosis R MIKE   Total Evaluation Time (Minutes)   Total Evaluation Time (Minutes) 10   Skin WDL   Skin WDL WDL   Skin Temperature warm   Skin Elasticity quick return to original state   Coping Strategies   Trust Relationship/Rapport care explained;choices provided;emotional support provided;empathic listening provided;questions answered;questions encouraged;reassurance provided;thoughts/feelings acknowledged

## 2021-02-24 NOTE — PLAN OF CARE
Patient vital signs are at baseline: Yes  Patient able to ambulate as they were prior to admission or with assist devices provided by therapies during their stay:  Yes  Patient MUST void prior to discharge:  Yes  Patient able to tolerate oral intake:  Yes  Pain has adequate pain control using Oral analgesics:  Yes

## 2021-02-24 NOTE — PROGRESS NOTES
Patient vital signs are at baseline: Yes  Patient able to ambulate as they were prior to admission or with assist devices provided by therapies during their stay:  Yes  Patient MUST void prior to discharge:  Yes  Patient able to tolerate oral intake:  Yes  Pain has adequate pain control using Oral analgesics:  Yes     Alert and oriented. Dressing to R hip intact. CMS intact. Voiding well, uses urinal.  VSS. Pain managed by scheduled Tylenol and  PRN oxycodone. Ambulated in hallway.

## 2021-02-24 NOTE — PLAN OF CARE
[unfilled]                                                                              [facility]      OUTPATIENT OCCUPATIONAL THERAPY  EVALUATION  PLAN OF TREATMENT FOR OUTPATIENT REHABILITATION  (COMPLETE FOR INITIAL CLAIMS ONLY)  Patient's Last Name, First Name, M.I.  YOB: 1950  Peter Hernandez                          Provider's Name  [facility] Medical Record No.  [id number]                               Onset Date:  02/23/21   Start of Care Date:  02/23/21     Type:     ___PT   _X_OT   ___SLP Medical Diagnosis:  R MIKE                        OT Diagnosis:  impaired I/ADL independence   Visits from SOC:  1   _________________________________________________________________________________  Plan of Treatment/Functional Goals    Planned Interventions: ADL retraining, IADL retraining, strengthening, transfer training   Goals: See Occupational Therapy Goals on Care Plan in [employer] electronic health record.    Therapy Frequency: 1x eval and treat  Predicted Duration of Therapy Intervention: 1  _________________________________________________________________________________    I CERTIFY THE NEED FOR THESE SERVICES FURNISHED UNDER        THIS PLAN OF TREATMENT AND WHILE UNDER MY CARE     (Physician co-signature of this document indicates review and certification of the therapy plan).                Certification date from: 02/24/21, Certification date to: 02/24/21    Referring Physician: Gera Davison MD            Initial Assessment        See Occupational Therapy evaluation dated 02/23/21 in Epic electronic health record.

## 2021-02-24 NOTE — PROGRESS NOTES
Patient vital signs are at baseline: Yes  Patient able to ambulate as they were prior to admission or with assist devices provided by therapies during their stay:  Yes  Patient MUST void prior to discharge:  Yes  Patient able to tolerate oral intake:  Yes  Pain has adequate pain control using Oral analgesics:  Yes     Pt alert and oriented. VSS. CMS intact. Dressing intact. Voiding, uses urinal. Pain managed by scheduled Tylenol, PRN Oxycodone and PRN Atarax.

## 2021-02-24 NOTE — PLAN OF CARE
Occupational Therapy Discharge Summary    Reason for therapy discharge:    All goals and outcomes met, no further needs identified.    Progress towards therapy goal(s). See goals on Care Plan in [facility] electronic health record for goal details.  Goals met    Therapy recommendation(s):    No further therapy is recommended.

## 2021-02-24 NOTE — PROGRESS NOTES
Patient vital signs are at baseline: Yes  Patient able to ambulate as they were prior to admission or with assist devices provided by therapies during their stay:  Yes  Patient MUST void prior to discharge:  Yes  Patient able to tolerate oral intake:  Yes  Pain has adequate pain control using Oral analgesics:  Yes   Pain controlled. Voiding in urinal. Dressing changed. CMS I/ntact. Stable-pathway.

## 2021-02-24 NOTE — PROGRESS NOTES
POD#1 R MIKE    Doing well  Pain well managed  No CP, SOB, N, V  AVSS  CMS intact  Dressing CDI  XRay looks good  Hgb pending  PT/OT/Aspirin  Plan home today  Follow-up with me in 2 weeks    Gera Davison MD

## 2021-02-24 NOTE — PROGRESS NOTES
[facility]    Medicine Progress Note - Hospitalist Service       Date of Admission:  2/23/2021  Assessment & Plan       Peter Saldana is a pleasant 70-year-old male with a past medical history of hypertension, hyperlipidemia, coronary artery disease, status post stent and chronic kidney disease who presents to the hospital for elective right total hip arthroplasty.  Procedure was performed on 02/23/2021 without any reported complications.  Hospitalist Service was consulted for routine postoperative medical co-management.      Severe osteoarthritis, status post right total hip arthroplasty 02/23/2021    Now POD #1, doing well    Full-dose aspirin ordered for deep venous thrombosis prophylaxis per primary service.     We will defer routine postoperative cares including pain management to Orthopedic Surgery.      Coronary artery disease with history of coronary stent in 2002:  He saw his outpatient Cardiologist, Dr. Blanca preoperatively,  He is asymptomatic with respect to coronary artery disease, nuclear stress testing did show a small area of infarction in the mid to basal inferolateral wall with mild polly-infarct ischemia.  Could also be artifact per cardiology.  He was deemed able to proceed with surgery.Patient denies chest currently    He takes a full-dose aspirin daily at baseline, which is resumed per Ortho.     Continue lisinopril and amlodipine.      Continue Crestor.      Hypertension:      Resume prior to admission lisinopril 10 mg daily and amlodipine 5 mg daily with holding parameters.      Hold prior to admission hydrochlorothiazide 12.5 mg daily.      Check BMP     Hyperlipidemia:      Resume prior to admission Crestor.      Asthma:  The patient is currently breathing comfortably.      No wheezes or recent exacerbation.      PTA inhalers can be resumed as needed, he uses this on a p.r.n. basis as an outpatient.      Chronic kidney disease:      Repeat creatinine is wnl  "     Avoid nephrotoxins as able.           Diet: Advance Diet as Tolerated: Regular Diet Adult  Discharge Instruction - Regular Diet Adult    DVT Prophylaxis: aspirin  Avendaño Catheter: not present  Code Status: Full Code           Disposition Plan   Expected discharge: Today, recommended to prior living arrangement   Entered: Xuan Tello MD 02/24/2021, 8:47 AM     The patient's care was discussed with the Bedside Nurse and Patient.    Xuan Tello MD  Hospitalist Service  [facility]  Contact information available via [facility] Paging/Directory    ______________________________________________________________________    Interval History   \"I just have to get used to walking differently.\"  Patient says he has been having hip pain for several years, he has altered his gait due to this, he has to learn new habits.  He says pain is controlled.  He has no new respiratory or GI complaints.  He does mention that he has problems with constipation, we discussed a variety of ways to avoid constipation while on opioids.    Data reviewed today: I reviewed all medications, new labs and imaging results over the last 24 hours. I personally reviewed the xr pelvis image(s) showing right MIKE.    Physical Exam   Vital Signs: Temp: 97.7  F (36.5  C) Temp src: Oral BP: 134/68 Pulse: 59   Resp: 16 SpO2: 97 % O2 Device: None (Room air) Oxygen Delivery: 5 LPM  Weight: 205 lbs 0 oz  Constitutional: Awake, alert, cooperative, no apparent distress  Respiratory: Clear to auscultation bilaterally, no crackles or wheezing  Cardiovascular: Regular rate and rhythm, normal S1 and S2, and no murmur noted  GI: Normal bowel sounds, soft, non-distended, non-tender  Skin/Integumen: No rash on exposed skin  Other: Mood is very pleasant and upbeat      Data   Recent Labs   Lab 02/24/21  0656 02/23/21  0746   HGB 11.9*  --      --    POTASSIUM 4.0 3.6   CHLORIDE 106  --    CO2 28  --    BUN 19  --    CR 0.93  --  "   ANIONGAP 4  --    MARLENE 8.6  --    *  --      Recent Results (from the past 24 hour(s))   XR Pelvis w Hip Port Right 1 View    Narrative    XR PELVIS AD HIP PORTABLE RIGHT 1 VIEW 2/23/2021 11:15 AM     HISTORY: Status post Hip surgery      Impression    IMPRESSION: Right total hip arthroplasty without apparent  complication. Skin staples and a surgical drain are in place.    ODETTE NEFF MD     Medications     lactated ringers 100 mL/hr at 02/23/21 2017       acetaminophen  975 mg Oral Q8H     amLODIPine  5 mg Oral Daily     aspirin  325 mg Oral Daily     docusate sodium  100 mg Oral BID     lisinopril  10 mg Oral QAM     polyethylene glycol  17 g Oral Daily     rosuvastatin  20 mg Oral Daily     senna-docusate  1 tablet Oral BID

## 2021-03-01 NOTE — DISCHARGE SUMMARY
Discharge Summary    Peter Hernandez II MRN# [id number]   YOB: 1950 Age: 70 year old     Date of Admission:  2/23/2021  Date of Discharge:  2/24/2021  Admitting Physician:  Gera Davison MD  Discharge Physician:  Gera Davison MD     Primary Provider: Willie Nam          Admission Diagnoses:   Osteoarthritis right hip          Discharge Diagnosis:   Same           Surgical Procedure:   Total Hip arthroplasty           Discharge Disposition:   Discharged to home           Medications Prior to Admission:     No medications prior to admission.             Discharge Medications:     Discharge Medication List as of 2/24/2021 11:25 AM      START taking these medications    Details   acetaminophen (TYLENOL) 325 MG tablet Take 2 tablets (650 mg) by mouth every 4 hours as needed for other (mild pain), Disp-100 tablet, R-0, E-Prescribe      aspirin (ASA) 325 MG EC tablet Take 1 tablet (325 mg) by mouth daily, Disp-30 tablet, R-0, E-Prescribe      hydrOXYzine (ATARAX) 10 MG tablet Take 1 tablet (10 mg) by mouth every 6 hours as needed for itching or anxiety (with pain, moderate pain), Disp-30 tablet, R-0, E-Prescribe      oxyCODONE (ROXICODONE) 5 MG tablet Take 1-2 tablets (5-10 mg) by mouth every 3 hours as needed for pain (Moderate to Severe), Disp-40 tablet, R-0, Local Print      senna-docusate (SENOKOT-S/PERICOLACE) 8.6-50 MG tablet Take 1-2 tablets by mouth 2 times daily Take while on oral narcotics to prevent or treat constipation., Disp-30 tablet, R-0, E-PrescribeWhile taking narcotics         CONTINUE these medications which have NOT CHANGED    Details   albuterol (VENTOLIN HFA) 108 (90 Base) MCG/ACT inhaler Inhale 2 puffs into the lungs every 6 hours as needed for shortness of breath / dyspnea or wheezing, Disp-3 Inhaler, R-3, E-PrescribePharmacy may dispense brand covered by insurance (Proair, or proventil or ventolin or generic albuterol inhaler) Does no t need refill yet, I am  just refilling this medication at his annual visit.      amLODIPine (NORVASC) 5 MG tablet Take 1 tablet (5 mg) by mouth daily, Disp-90 tablet, R-3, E-PrescribeDoes not need refill yet, I am just refilling this medication at his annual visit.      Ascorbic Acid (VITAMIN C) 500 MG CAPS Take 500 mg by mouth 2 times daily (with meals) , Historical      hydrochlorothiazide (MICROZIDE) 12.5 MG capsule Take 1 capsule (12.5 mg) by mouth daily, Disp-90 capsule, R-3, E-PrescribeDoes not need refill yet, I am just refilling this medication at his annual visit.      ketoconazole (NIZORAL) 2 % external cream APPLY TOPICALLY DAILYDisp-60 g, B-[id number]      lisinopril (ZESTRIL) 10 MG tablet TAKE 1 TABLET BY MOUTH ONCE DAILY., Disp-90 tablet, R-3, E-PrescribeDoes not need refill yet, I am just refilling this medication at his annual visit.      Multiple Vitamin (DAILY MULTIVITAMIN PO) Take 2 capsules by mouth every morning , Historical      Naphazoline-Pheniramine (OPCON-A OP) Place 1 drop into both eyes daily as needed (cat allergies), Historical      naproxen (NAPROSYN) 500 MG tablet TAKE 1 TABLET BY MOUTH TWICE A DAY WITH MEALS, Disp-40 tablet, R-1, E-Prescribe      nitroGLYcerin (NITROSTAT) 0.4 MG sublingual tablet For chest pain place 1 tablet under the tongue every 5 minutes for 3 doses. If symptoms persist 5 minutes after 1st dose call 911., Disp-25 tablet, R-3, E-Prescribe      Probiotic Product (PROBIOTIC-10 PO) Take 1 capsule by mouth every morning , Historical      Psyllium (METAMUCIL FIBER PO) Take 1-2 teaspoonful by mouth daily as needed (mix with a glass of water and drink), Historical      QVAR REDIHALER 80 MCG/ACT inhaler Inhale 1 puff into the lungs 2 times daily Only in winter or Around cats See Admin Instructions Only in winter or Around cats, Disp-1 Inhaler, R-11, EMRE, E-Prescribe      rosuvastatin (CRESTOR) 20 MG tablet Take 1 tablet (20 mg) by mouth daily, Disp-90 tablet, R-3, E-PrescribeDoes not need  refill yet, I am just refilling this medication at his annual visit.      Vitamin D, Cholecalciferol, 1000 units CAPS Take 3,000 Units by mouth every morning , Historical      zinc gluconate 50 MG tablet Take 50 mg by mouth every morning , Historical         STOP taking these medications       aspirin 81 MG tablet Comments:   Reason for Stopping:                     Consultations:   Consultation during this admission received from internal medicine.  No medical intervention was indicated.             Hospital Course:   The patient was admitted after the surgical procedure.The patient underwent an uneventful Total hip arthroplasty. Postoperatively, anticoagulation with aspirin was initiated. Home medications have been reconciled. Oxycodone 5 mg was prescribed for pain.             Discharge Instructions and Follow-Up:        Discharge activity: WBAT with a walker   Discharge follow-up: Follow-up with Makayla De Guzman PA-C in ~14 days post-op. [phone]   Outpatient therapy: Should already be arranged by office.  If not, patient should call to schedule 2x/week x 3 weeks.   Home Care agency: None   Supplies and equipment: None        Wound care: Daily dry dressing changes.  May use adaptic/xeroform directly over incision if available.  Staples out 12 days post-op and apply steri-strips.    Other instructions: Posterior hip precautions.  No hip flexion up past 90deg.  No ADduction of the surgical leg across the midline.     Makayla De Guzman PA-C

## 2021-03-09 ENCOUNTER — CARE COORDINATION (OUTPATIENT)
Dept: CARDIOLOGY | Facility: CLINIC | Age: 71
End: 2021-03-09

## 2021-03-09 NOTE — PROGRESS NOTES
Received call from pt stating that his BP seems to be trending down.   On 3/02/21 BP was 138/68  3/03/21 BP was 123/62  3/05/21 BP was 111/53  3/09/21 BP was 104/56    Pt denies feeling lightheaded or dizzy. Pt advised to continue to monitor & call back on Friday, sooner if he becomes symptomatic. Pt verbalized understanding. Chuy BROWNE

## 2021-03-12 NOTE — PROGRESS NOTES
Pt called back, states his BP was 122/73 today & was 138/74 on Wednesday. Pt states he feels fine. Pt advised to continue to monitor his BP & call back with any questions or concerns. Chuy BROWNE

## 2021-03-25 ENCOUNTER — DOCUMENTATION ONLY (OUTPATIENT)
Dept: OTHER | Facility: CLINIC | Age: 71
End: 2021-03-25

## 2021-04-26 DIAGNOSIS — M79.672 LEFT FOOT PAIN: ICD-10-CM

## 2021-04-27 RX ORDER — NAPROXEN 500 MG/1
TABLET ORAL
Qty: 40 TABLET | Refills: 1 | Status: SHIPPED | OUTPATIENT
Start: 2021-04-27 | End: 2021-07-26

## 2021-04-29 ENCOUNTER — OFFICE VISIT (OUTPATIENT)
Dept: FAMILY MEDICINE | Facility: CLINIC | Age: 71
End: 2021-04-29

## 2021-04-29 DIAGNOSIS — L84 CORN OR CALLUS: Primary | ICD-10-CM

## 2021-04-29 PROCEDURE — 99213 OFFICE O/P EST LOW 20 MIN: CPT | Performed by: FAMILY MEDICINE

## 2021-04-29 NOTE — PATIENT INSTRUCTIONS
A pumice stone is available in any beauty shop and will keep hypertrophic skin, like a corn, under control.

## 2021-04-29 NOTE — PROGRESS NOTES
Peter Hernandez II is a 71 year old male has had 5 wart(s) for 4 month(s) and has not tried over-the counter anti-wart medications.  There is no history of infection or injury.  This is the patient's first treatment.    O: The patient appears today in no apparent distress.  Vitals as above.  Skin: A non-erythematous, raised papule with pinpoint hemmorhages measuring 2 mm is seen on the feet.  A few smaller satellite papules are also noted.     A: Common corn.    P:  A pumice stone is available in any beauty shop and will keep hypertrophic skin, like a corn, under control.

## 2021-05-04 ENCOUNTER — TELEPHONE (OUTPATIENT)
Dept: CARDIOLOGY | Facility: CLINIC | Age: 71
End: 2021-05-04

## 2021-05-04 NOTE — TELEPHONE ENCOUNTER
Received call from pt stating that his ankles have been a little swollen lately, left more than the right, better in the morning but gets worse during the day. He denies shortness of breath.   Pt has apt next week w/ Dr. Blanca to assess. Pt asking if there is some blood work he should get done to evaluate the swelling, advised there is not.   Pt advised to keep his feet elevated when he is sitting, decrease sodium in diet, and increase walking.   Pt advised he could consider compression stockings, advised him to discuss with Dr. Nam. Chuy BROWNE

## 2021-05-04 NOTE — PROGRESS NOTES
History of Present Illness:    Peter Hernandez II is a 69 year old male followed here by Dr. Blanca. He returns today for is annual visit.     Peter has a history of coronary artery disease, hypertension and dyslipidemia. He reported facial flushing to us in the past and carcinoid syndrome was ruled out.    In 2012 he underwent stenting of the RCA with 20% LAD and 40% circumflex. Stress echocardiogram at the end of 2015 showed no ischemia. LV function is preserved. His angina is left shoulder pain with activity.    In the past his BP was elevated and we placed him on hydrocholorthiazide and BP improved. Last visit he had a mild elevation of his creatinine to 1.3 from 0.99-1.22; this was rechecked after he was hydrated and found to be back to 1.27.    He has no new medical history other than fighting with sciatica pain on the right side. He is seeing chiropractic for this. Takes Aleve regularly. Given his mild creatinine elevation, I suggested that he limit this and try Tylenol. If that does not help, see PMD for other options.    BMP: sodium 142 potassium 4.2 creatinine 1.22 GFR 59  Lipids: Total Cholesterol: 144 HDL 53 LDL 67     Exam is unremarkable and outlined below    Impression/Plan:     1. CAD s/p RCA stent 2012  Other disease as noted  No left shoulder pain which is his anginal equivalent  Stable, no changes    2. Dyslipidemia-controlled  -continue Crestor 20mg daily    3. HTN-controlled  -continue same meds    4. facial flushing in past, resolved  -negative work up for carcinoid        It has been a pleasure seeing Peter Hernandez II in follow up. He would like to see Dr. Blanca in 6 months which I have ordered.     Clemente Nichols, MSN, APRN-BC, CNP  Cardiology    Orders Placed This Encounter   Procedures     Follow-Up with Cardiologist     Orders Placed This Encounter   Medications     lisinopril (PRINIVIL/ZESTRIL) 10 MG tablet     Sig: TAKE 1 TABLET BY MOUTH ONCE DAILY.      Dispense:  90 tablet     Refill:  3     amLODIPine (NORVASC) 5 MG tablet     Sig: Take 1 tablet (5 mg) by mouth daily     Dispense:  90 tablet     Refill:  3     rosuvastatin (CRESTOR) 20 MG tablet     Sig: Take 1 tablet (20 mg) by mouth daily     Dispense:  90 tablet     Refill:  3     nitroGLYcerin (NITROSTAT) 0.4 MG sublingual tablet     Sig: For chest pain place 1 tablet under the tongue every 5 minutes for 3 doses. If symptoms persist 5 minutes after 1st dose call 911.     Dispense:  25 tablet     Refill:  3     hydrochlorothiazide (MICROZIDE) 12.5 MG capsule     Sig: Take 1 capsule (12.5 mg) by mouth daily     Dispense:  90 capsule     Refill:  3     Medications Discontinued During This Encounter   Medication Reason     GLUCOSAMINE-CHONDROITIN PO Stopped by Patient     lisinopril (PRINIVIL/ZESTRIL) 10 MG tablet Reorder     amLODIPine (NORVASC) 5 MG tablet Reorder     rosuvastatin (CRESTOR) 20 MG tablet Reorder     nitroGLYcerin (NITROSTAT) 0.4 MG sublingual tablet Reorder     hydrochlorothiazide (MICROZIDE) 12.5 MG capsule Reorder         Encounter Diagnoses   Name Primary?     Benign essential hypertension Yes     Hypercholesteremia      Encounter for medication refill      Pure hypercholesterolemia      Coronary artery disease involving native coronary artery of native heart without angina pectoris      Essential hypertension        CURRENT MEDICATIONS:  Current Outpatient Medications   Medication Sig Dispense Refill     albuterol (VENTOLIN HFA) 108 (90 BASE) MCG/ACT inhaler Inhale 2 puffs into the lungs every 6 hours as needed for shortness of breath / dyspnea or wheezing       amLODIPine (NORVASC) 5 MG tablet Take 1 tablet (5 mg) by mouth daily 90 tablet 3     aspirin 81 MG tablet Take 1 tablet by mouth daily.  3     hydrochlorothiazide (MICROZIDE) 12.5 MG capsule Take 1 capsule (12.5 mg) by mouth daily 90 capsule 3     HYDROcodone-acetaminophen (NORCO) 5-325 MG per tablet Take 1 tablet by mouth every 6  hours as needed for severe pain 20 tablet 0     lisinopril (PRINIVIL/ZESTRIL) 10 MG tablet TAKE 1 TABLET BY MOUTH ONCE DAILY. 90 tablet 3     Multiple Vitamin (DAILY MULTIVITAMIN PO) Take  by mouth.         nitroGLYcerin (NITROSTAT) 0.4 MG sublingual tablet For chest pain place 1 tablet under the tongue every 5 minutes for 3 doses. If symptoms persist 5 minutes after 1st dose call 911. 25 tablet 3     ranitidine (ZANTAC) 150 MG tablet Take 150 mg by mouth as needed.       rosuvastatin (CRESTOR) 20 MG tablet Take 1 tablet (20 mg) by mouth daily 90 tablet 3     tadalafil (CIALIS) 5 MG tablet Take 1 tablet by mouth as needed for erectile dysfunction. Never use with nitroglycerin, terazosin or doxazosin. 30 tablet 1     ketoconazole (NIZORAL) 2 % external cream APPLY TOPICALLY DAILY 60 g 0     QVAR REDIHALER 80 MCG/ACT AERB See Admin Instructions Only in winter or  Around cats  0       ALLERGIES     Allergies   Allergen Reactions     Cats      Simvastatin      achey       PAST MEDICAL HISTORY:  Past Medical History:   Diagnosis Date     ACP (advance care planning)     will bring copy in      Bruit      CAD (coronary artery disease) 2002    a stent     Esophageal reflux 5/17/2013     Family history of ischemic heart disease      Hyperlipidaemia      Hypertension        PAST SURGICAL HISTORY:  Past Surgical History:   Procedure Laterality Date     HEART CATH, ANGIOPLASTY  10/4/02    3.0 X 8 mm Velocity stent     ROTATOR CUFF REPAIR RT/LT  2009    Dr. Butler       FAMILY HISTORY:  Family History   Problem Relation Age of Onset     C.A.D. Father      Cancer Father         bone     Cerebrovascular Disease Mother        SOCIAL HISTORY:  Social History     Socioeconomic History     Marital status:      Spouse name: None     Number of children: None     Years of education: None     Highest education level: None   Occupational History     Occupation: Finance   Social Needs     Financial resource strain: None     Food  insecurity:     Worry: None     Inability: None     Transportation needs:     Medical: None     Non-medical: None   Tobacco Use     Smoking status: Never Smoker     Smokeless tobacco: Never Used   Substance and Sexual Activity     Alcohol use: Yes     Alcohol/week: 0.5 oz     Types: 1 Standard drinks or equivalent per week     Comment: ocasionally     Drug use: No     Sexual activity: Yes     Partners: Female   Lifestyle     Physical activity:     Days per week: None     Minutes per session: None     Stress: None   Relationships     Social connections:     Talks on phone: None     Gets together: None     Attends [employer] service: None     Active member of club or organization: None     Attends meetings of clubs or organizations: None     Relationship status: None     Intimate partner violence:     Fear of current or ex partner: None     Emotionally abused: None     Physically abused: None     Forced sexual activity: None   Other Topics Concern     Parent/sibling w/ CABG, MI or angioplasty before 65F 55M? No      Service Not Asked     Blood Transfusions Not Asked     Caffeine Concern Not Asked     Occupational Exposure Not Asked     Hobby Hazards Not Asked     Sleep Concern Yes     Comment: occ     Stress Concern No     Weight Concern Not Asked     Special Diet Not Asked     Back Care Not Asked     Exercise Yes     Comment: walks daily     Bike Helmet Not Asked     Seat Belt Not Asked     Self-Exams Not Asked   Social History Narrative     None       Review of Systems:  Skin:  Negative       Eyes:  Positive for glasses    ENT:  Negative      Respiratory:  Positive for dyspnea on exertion asthma    Cardiovascular:  Negative      Gastroenterology: Negative      Genitourinary:  not assessed      Musculoskeletal:  Positive for back pain    Neurologic:  Negative      Psychiatric:  Negative      Heme/Lymph/Imm:  Positive for allergies    Endocrine:  Negative        Physical Exam:  Vitals: /76   Pulse 62    "Ht 1.854 m (6' 1\")   Wt 100.7 kg (222 lb)   BMI 29.29 kg/m      Constitutional:  cooperative, alert and oriented, well developed, well nourished, in no acute distress overweight      Skin:  warm and dry to the touch, no apparent skin lesions or masses noted          Head:  normocephalic, no masses or lesions        Eyes:  pupils equal and round, conjunctivae and lids unremarkable, sclera white, no xanthalasma, EOMS intact, no nystagmus        Lymph:No Cervical lymphadenopathy present     ENT:  no pallor or cyanosis, dentition good        Neck:  carotid pulses are full and equal bilaterally, JVP normal, no carotid bruit        Respiratory:  normal breath sounds, clear to auscultation, normal A-P diameter, normal symmetry, normal respiratory excursion, no use of accessory muscles         Cardiac: regular rhythm;normal S1 and S2;apical impulse not displaced;no murmurs, gallops or rubs detected   S4            pulses full and equal, no bruits auscultated                                        GI:  abdomen soft, non-tender, BS normoactive, no mass, no HSM, no bruits;abdomen soft        Extremities and Muscular Skeletal:  no deformities, clubbing, cyanosis, erythema observed;no edema              Neurological:  no gross motor deficits;affect appropriate        Psych:  Alert and Oriented x 3      Recent Lab Results:  LIPID RESULTS:  Lab Results   Component Value Date    CHOL 144 04/17/2019    HDL 53 04/17/2019    LDL 67 04/17/2019    TRIG 120 04/17/2019    CHOLHDLRATIO 2.7 07/01/2015       LIVER ENZYME RESULTS:  Lab Results   Component Value Date    AST 24 12/19/2016    ALT <5 (L) 04/17/2019       CBC RESULTS:  Lab Results   Component Value Date    WBC 7.7 09/10/2018    WBC 10.5 06/16/2012    RBC 4.95 09/10/2018    RBC 4.69 06/16/2012    HGB 14.5 09/10/2018    HCT 44.2 09/10/2018    MCV 89.3 09/10/2018    MCH 29.3 09/10/2018    MCHC 32.8 (A) 09/10/2018    RDW 13.4 06/16/2012     09/10/2018       BMP RESULTS:  Lab " Detail Level: Generalized Results   Component Value Date     04/17/2019    POTASSIUM 4.2 04/17/2019    CHLORIDE 106 04/17/2019    CO2 29 04/17/2019    ANIONGAP 11.2 04/17/2019     (H) 04/17/2019    BUN 21 04/17/2019    CR 1.22 04/17/2019    GFRESTIMATED 59 (L) 04/17/2019    GFRESTBLACK 72 04/17/2019    MARLENE 9.0 04/17/2019        A1C RESULTS:  Lab Results   Component Value Date    A1C 5.8 (H) 04/27/2018       INR RESULTS:  No results found for: INR        CC  No referring provider defined for this encounter.                 Detail Level: Detailed

## 2021-05-11 ENCOUNTER — OFFICE VISIT (OUTPATIENT)
Dept: FAMILY MEDICINE | Facility: CLINIC | Age: 71
End: 2021-05-11

## 2021-05-11 VITALS
WEIGHT: 212.19 LBS | SYSTOLIC BLOOD PRESSURE: 138 MMHG | TEMPERATURE: 98.1 F | HEART RATE: 70 BPM | RESPIRATION RATE: 16 BRPM | DIASTOLIC BLOOD PRESSURE: 70 MMHG | OXYGEN SATURATION: 97 % | HEIGHT: 73 IN | BODY MASS INDEX: 28.12 KG/M2

## 2021-05-11 DIAGNOSIS — R10.32 ABDOMINAL PAIN, LEFT LOWER QUADRANT: ICD-10-CM

## 2021-05-11 DIAGNOSIS — I10 BENIGN ESSENTIAL HYPERTENSION: Primary | ICD-10-CM

## 2021-05-11 PROCEDURE — 93050 ART PRESSURE WAVEFORM ANALYS: CPT | Performed by: FAMILY MEDICINE

## 2021-05-11 PROCEDURE — 99214 OFFICE O/P EST MOD 30 MIN: CPT | Performed by: FAMILY MEDICINE

## 2021-05-11 ASSESSMENT — ASTHMA QUESTIONNAIRES
QUESTION_5 LAST FOUR WEEKS HOW WOULD YOU RATE YOUR ASTHMA CONTROL: WELL CONTROLLED
QUESTION_4 LAST FOUR WEEKS HOW OFTEN HAVE YOU USED YOUR RESCUE INHALER OR NEBULIZER MEDICATION (SUCH AS ALBUTEROL): TWO OR THREE TIMES PER WEEK
QUESTION_3 LAST FOUR WEEKS HOW OFTEN DID YOUR ASTHMA SYMPTOMS (WHEEZING, COUGHING, SHORTNESS OF BREATH, CHEST TIGHTNESS OR PAIN) WAKE YOU UP AT NIGHT OR EARLIER THAN USUAL IN THE MORNING: NOT AT ALL
QUESTION_2 LAST FOUR WEEKS HOW OFTEN HAVE YOU HAD SHORTNESS OF BREATH: NOT AT ALL
ACT_TOTALSCORE: 22
QUESTION_1 LAST FOUR WEEKS HOW MUCH OF THE TIME DID YOUR ASTHMA KEEP YOU FROM GETTING AS MUCH DONE AT WORK, SCHOOL OR AT HOME: NONE OF THE TIME

## 2021-05-11 ASSESSMENT — MIFFLIN-ST. JEOR: SCORE: 1781.36

## 2021-05-11 NOTE — PROGRESS NOTES
Left mid flank discomfort    Constipation -    Onset: 2 year(s) ago    Description:   Frequency of bowel movements: 2-3 days     Accompanying Signs & Symptoms:   Abdominal pain: maybe this is   Blood in stool: no  Rectal pain: no  Nausea/vomiting: no  Fevers: no  Weight loss or gain: no    Precipitating and/or Alleviating factors:    Exercising daily: yes  Recent use of Narcotics, anticholinergics, calcium channel blockers, antacids, or iron supplements: no   Any previous tests: no   History of abdominal surgery: no  Chronic Laxative Use on senna and miralax  sennecot and miralax.  Was previously on Fiber but hospital workders recommended that he quit.  Problem(s) Oriented visit      ROS:  General and Resp. completed and negative except as noted above     HISTORY:   reports current alcohol use of about 0.8 standard drinks of alcohol per week.   reports that he has never smoked. He has never used smokeless tobacco.    Past Medical History:   Diagnosis Date     ACP (advance care planning)      Arthritis      Bruit      CAD (coronary artery disease) 2002     CKD (chronic kidney disease)      Esophageal reflux 5/17/2013     Family history of ischemic heart disease      Hyperlipidaemia      Hypertension      Past Surgical History:   Procedure Laterality Date     ARTHROPLASTY HIP Right 2/23/2021    Procedure: RIGHT TOTAL HIP ARTHROPLASTY;  Surgeon: Gera Davison MD;  Location: [facility]     CYSTECTOMY PILONIDAL       HEART CATH, ANGIOPLASTY  10/4/02    3.0 X 8 mm Velocity stent     ROTATOR CUFF REPAIR RT/LT  2009    Dr. Butler       EXAM:  BP: 138/70   Pulse: 70    Temp: 98.1    Wt Readings from Last 2 Encounters:   05/11/21 96.2 kg (212 lb 3 oz)   04/29/21 86.6 kg (191 lb)       BMI= Body mass index is 27.99 kg/m .    EXAM:  APPEARANCE: = Relaxed and in no distress  Conj/Eyelids = noninjected and lids and lashes are without inflammation  PERRLA/Irises = Pupils Round Reactive to Light and Irisis without  "inflammation  Ears/Nose = External structures and Nares have usual shape and form  Ear canals and TM's = Canals are not inflammed and have none or little wax and the drums are not injected and have a light reflex   Lips/Teeth/Gums = No lesions seen, good dentition, and gums seem healthy  Oropharynx = No leukoplakia, No injection to the tissues, Normal Uvula  Neck = No anterior or posterior adenopathy appreciated.  Resp effort = Calm regular breathing  Breath Sounds = Good air movement with no rales or rhonchi in any lung fields  Mood/Affect = Cooperative and interested      Assessment/Plan:  Peter was seen today for asthma, constipation, arthritis and flank pain.    Diagnoses and all orders for this visit:    Benign essential hypertension  -     VT ART PRESS WAVEFORM ANALYS CENTRAL ART PRESSURE    Abdominal pain, left lower quadrant    push metamucil and prunes and decrease the use on the miralax and senna  COUNSELING:   reports that he has never smoked. He has never used smokeless tobacco.    Estimated body mass index is 27.99 kg/m  as calculated from the following:    Height as of this encounter: 1.854 m (6' 1\").    Weight as of this encounter: 96.2 kg (212 lb 3 oz).       Appropriate preventive services were discussed with this patient, including applicable screening as appropriate for cardiovascular disease, diabetes, osteopenia/osteoporosis, and glaucoma.  As appropriate for age/gender, discussed screening for colorectal cancer, prostate cancer, breast cancer, and cervical cancer. Checklist reviewing preventive services available has been given to the patient.    Reviewed patients plan of care and provided an AVS. The Basic Care Plan (routine screening as documented in Health Maintenance) for Peter meets the Care Plan requirement. This Care Plan has been established and reviewed with the  Patient.      The following health maintenance items are reviewed in Epic and correct as of today:  Health Maintenance "   Topic Date Due     ASTHMA CONTROL TEST  04/08/2021     LIPID  06/19/2021     ASTHMA ACTION PLAN  06/19/2021     COLORECTAL CANCER SCREENING  07/18/2021     MEDICARE ANNUAL WELLNESS VISIT  10/08/2021     FALL RISK ASSESSMENT  10/08/2021     DTAP/TDAP/TD IMMUNIZATION (4 - Td) 10/24/2023     ADVANCE CARE PLANNING  03/25/2026     HEPATITIS C SCREENING  Completed     PHQ-2  Completed     INFLUENZA VACCINE  Completed     Pneumococcal Vaccine: Pediatrics (0 to 5 Years) and At-Risk Patients (6 to 64 Years)  Completed     Pneumococcal Vaccine: 65+ Years  Completed     ZOSTER IMMUNIZATION  Completed     AORTIC ANEURYSM SCREENING (SYSTEM ASSIGNED)  Completed     COVID-19 Vaccine  Completed     IPV IMMUNIZATION  Aged Out     MENINGITIS IMMUNIZATION  Aged Out     HEPATITIS B IMMUNIZATION  Aged Out       Willie Nam  [facility]  For any issues my office # is [phone]

## 2021-05-12 ENCOUNTER — TELEPHONE (OUTPATIENT)
Dept: CARDIOLOGY | Facility: CLINIC | Age: 71
End: 2021-05-12

## 2021-05-12 ASSESSMENT — ASTHMA QUESTIONNAIRES: ACT_TOTALSCORE: 22

## 2021-05-12 NOTE — TELEPHONE ENCOUNTER
----- Message from Armani Mckeon sent at 5/12/2021  8:33 AM CDT -----  Regarding: ARGELIA in this pt  Hi,    This pt called to CXL his appt tomorrow with KFS- states saw his GI and the swelling is being taken care if there and feels he does not need appt anymore so he r/s to Sept for his annual instead. Pt states if you need labs in Sept please call him.    Thank you,  Charlotte

## 2021-05-13 ENCOUNTER — OFFICE VISIT (OUTPATIENT)
Dept: FAMILY MEDICINE | Facility: CLINIC | Age: 71
End: 2021-05-13

## 2021-05-13 VITALS
BODY MASS INDEX: 28.18 KG/M2 | TEMPERATURE: 98.3 F | WEIGHT: 213.6 LBS | SYSTOLIC BLOOD PRESSURE: 128 MMHG | OXYGEN SATURATION: 97 % | DIASTOLIC BLOOD PRESSURE: 65 MMHG | HEART RATE: 66 BPM

## 2021-05-13 DIAGNOSIS — H65.01 NON-RECURRENT ACUTE SEROUS OTITIS MEDIA OF RIGHT EAR: Primary | ICD-10-CM

## 2021-05-13 DIAGNOSIS — I10 BENIGN ESSENTIAL HYPERTENSION: ICD-10-CM

## 2021-05-13 PROCEDURE — 99213 OFFICE O/P EST LOW 20 MIN: CPT | Performed by: NURSE PRACTITIONER

## 2021-05-13 NOTE — PROGRESS NOTES
"Problem(s) Oriented visit        SUBJECTIVE:                                                    Peter Hernandez II is a 71 year old male who presents to clinic today for the following health issues :    Feels like there is water \"sloshing\" in right ear. Has had symptoms for about the past week. No pain in or around ear. No symptoms in left ear. Denies watery/itchy eyes, runny/stuffy nose, sneezing, sore throat or other allergy symptoms. Wondering if he got water in ear while showering. Has not tried any medications for symptoms    Has hypertension well controlled with Lisinopril 10 mg daily & hydrochlorothiazide 12.5 mg daily without side effects.  BP Readings from Last 3 Encounters:   05/13/21 128/65   05/11/21 138/70   04/29/21 132/80       Problem list, Medication list, Allergies, and Medical/Social/Surgical histories reviewed in [facility] and updated as appropriate.   Additional history: as documented    ROS:  5 point ROS completed and negative except noted above, including Gen, HEENT, CV, Resp    OBJECTIVE:                                                    /65   Pulse 66   Temp 98.3  F (36.8  C)   Wt 96.9 kg (213 lb 9.6 oz)   SpO2 97%   BMI 28.18 kg/m    Body mass index is 28.18 kg/m .   GENERAL: healthy, alert and no distress  EYES: Eyes grossly normal to inspection  HENT: normal cephalic/atraumatic, right ear: clear effusion, left ear: normal: no effusions, no erythema, normal landmarks  PSYCH: mentation appears normal, affect normal/bright     ASSESSMENT/PLAN:                                                        Peter was seen today for ear problem.    Diagnoses and all orders for this visit:    Non-recurrent acute serous otitis media of right ear  No signs of infection. Recommend antihistamine with or without sudafed. Instructed that sudafed may raise blood pressure and to monitor this. Follow-up in 1-2 weeks if no improvement, sooner if worsening    Benign essential hypertension  Well " controlled with current medications. No changes      See Patient Instructions  Patient Instructions   Try antihistamine like Claritin or Zyrtec 1 pill (10 mg) once daily    Could also use Sudafed but watch blood pressure since this medication can raise blood pressure    Follow-up if worsening or no improvement in next 1-2 weeks.       JEANNE Flores CNP  [facility]  [phone]    For any issues my office # is [phone]

## 2021-05-13 NOTE — PATIENT INSTRUCTIONS
Try antihistamine like Claritin or Zyrtec 1 pill (10 mg) once daily    Could also use Sudafed but watch blood pressure since this medication can raise blood pressure    Follow-up if worsening or no improvement in next 1-2 weeks.

## 2021-05-24 ENCOUNTER — OFFICE VISIT (OUTPATIENT)
Dept: FAMILY MEDICINE | Facility: CLINIC | Age: 71
End: 2021-05-24

## 2021-05-24 VITALS
DIASTOLIC BLOOD PRESSURE: 78 MMHG | HEIGHT: 73 IN | RESPIRATION RATE: 16 BRPM | SYSTOLIC BLOOD PRESSURE: 134 MMHG | HEART RATE: 68 BPM | BODY MASS INDEX: 28.23 KG/M2 | OXYGEN SATURATION: 97 % | WEIGHT: 213 LBS

## 2021-05-24 DIAGNOSIS — I89.0 LYMPHEDEMA OF BOTH LOWER EXTREMITIES: ICD-10-CM

## 2021-05-24 DIAGNOSIS — R60.0 BILATERAL EDEMA OF LOWER EXTREMITY: Primary | ICD-10-CM

## 2021-05-24 PROCEDURE — 99213 OFFICE O/P EST LOW 20 MIN: CPT | Performed by: NURSE PRACTITIONER

## 2021-05-24 ASSESSMENT — MIFFLIN-ST. JEOR: SCORE: 1785.04

## 2021-05-24 NOTE — PROGRESS NOTES
"Problem(s) Oriented visit        SUBJECTIVE:                                                    Peter Hernandez II is a 71 year old male who presents to clinic today for the following health issues :    Seen at [facility] ED on 5/21/2021 for left lower extremity swelling. Ultrasound was negative for DVT. Patient states he was diagnosed with lymphedema while there and told to wear compression stockings, which he needs prescription for. Swelling gets worse during the day with walking and better when he elevates legs. Not painful. No redness or focal area of swelling. Denies chest pain, pressure, palpitations, shortness of breath.     Problem list, Medication list, Allergies, and Medical/Social/Surgical histories reviewed in EPIC and updated as appropriate.   Additional history: as documented    ROS:  4 point ROS completed and negative except noted above, including Gen, CV, Resp, MS    OBJECTIVE:                                                    /78   Pulse 68   Resp 16   Ht 1.854 m (6' 1\")   Wt 96.6 kg (213 lb)   SpO2 97%   BMI 28.10 kg/m    Body mass index is 28.1 kg/m .   GENERAL: healthy, alert and no distress  RESP: lungs clear to auscultation - no rales, rhonchi or wheezes  CV: regular rates and rhythm, normal S1 S2, no S3 or S4, no murmur, click or rub and 2+ bilateral lower extremity pitting edema to legs below knee    MS: grossly normal  SKIN: no suspicious lesions or rashes  PSYCH: mentation appears normal, affect normal/bright     ASSESSMENT/PLAN:                                                      Peter was seen today for lymphedema.    Diagnoses and all orders for this visit:    Bilateral edema of lower extremity  Lymphedema of both lower extremities  -     Compression Sleeve/Stocking Order for DME - ONLY FOR DME  Printed prescription given to patient for compression stockings. No concerning findings today. Advised on compression stocking use, elevating legs, watching sodium intake. " Return to clinic if symptoms worsen or if failing to improve over the next week.      See Patient Instructions  Patient Instructions   Compression stockings - on in the morning and off before bed    Elevate legs when possible    Watch sodium intake      JEANNE Flores CNP  [facility]  [phone]    For any issues my office # is [phone]

## 2021-05-24 NOTE — PATIENT INSTRUCTIONS
Compression stockings - on in the morning and off before bed    Elevate legs when possible    Watch sodium intake

## 2021-06-10 ENCOUNTER — CARE COORDINATION (OUTPATIENT)
Dept: CARDIOLOGY | Facility: CLINIC | Age: 71
End: 2021-06-10

## 2021-06-10 NOTE — PROGRESS NOTES
Received call from pt stating that he is tentatively scheduled for shoulder surgery 6/29, not urgent but would like to get it done sooner than later to get recovery done during the summer. Pt states he was told her needs cardiac clearance for surgery. Will message Dr. Blanca if pt needs to be seen as he was recently cleared for hip surgery. Will message Dr. Blanca to review. Chuy BROWNE

## 2021-06-11 NOTE — TELEPHONE ENCOUNTER
Needs no testing and surprised he needs another visit for clearance as he had clearance only 4 months ago. Would just have a quick visit with us as they wont do the surgery without another clearance !!

## 2021-06-21 NOTE — PROGRESS NOTES
[facility] [address] [place] [address]  Phone: [phone]  Fax: [phone]  Primary Provider: Mirna Nam  Pre-op Performing Provider: MIRNA NAM      PREOPERATIVE EVALUATION:  Today's date: 6/22/2021    Peter Hernandez II is a 71 year old male who presents for a preoperative evaluation.    Surgical Information:  Surgery/Procedure: Right Rotator Cuff  Surgery Location: [facility]  Surgeon: Gera Davison MD  Surgery Date: 7/01/2021  Time of Surgery: TBD  Where patient plans to recover: At home with family  Fax number for surgical facility: [phone]    Type of Anesthesia Anticipated: to be determined    Preoperative Questionnaire:   No - Have you ever had a heart attack or stroke?  Yes - Have you ever had surgery on your heart or blood vessels, such as a stent, coronary (heart) bypass, or surgery on an artery in the head, neck, heart, or legs? Stent  No - Do you have chest pain when you are physically active?  No - Do you have a history of heart failure?  No - Do you currently have a cold, bronchitis, or symptoms of other respiratory (head and chest) infections?  No - Do you have a cough, shortness of breath, or wheezing?  No - Do you or anyone in your family have a history of blood clots?  No - Do you or anyone in your family have a serious bleeding problem, such as long-lasting bleeding after surgeries or cuts?  No - Have you ever had anemia or been told to take iron pills?  No - Have you had any abnormal blood loss such as black, tarry or bloody stools, or abnormal vaginal bleeding?  No - Have you ever had a blood transfusion?  Yes - Are you willing to have a blood transfusion if it is medically needed before, during, or after your surgery?  No - Have you or anyone in your family ever had problems with anesthesia (sedation for surgery)?  No - Do you have sleep apnea, excessive snoring, or daytime drowsiness?   No - Do you have any  artifical heart valves or other implanted medical devices, such as a pacemaker, defibrillator, or continuous glucose monitor?  No - Do you have any artifical joints?  No - Are you allergic to latex?  No - Is there any chance that you may be pregnant?      Assessment & Plan     The proposed surgical procedure is considered INTERMEDIATE risk.    Preop general physical exam      Benign essential hypertension  Essential Hypertension   Remains well controlled when checked out of clinic.   he has not experienced any significant side effects from medications for hypertension.    NO active cardiac complaints or symptoms with exercise.  Current medications for treatment:  ACE inhibitors (Lisinopril, Ramipril,Captopril,Benazepril), Diuretic thiazide (chlorthalidone, hydrochlorothiazide, indapamide and Calcium channel blocker (Amlodipine, Diltiazem,Verapamil)    Reviewed last 6 BP readings in chart:  BP Readings from Last 6 Encounters:   06/22/21 134/72   05/24/21 134/78   05/13/21 128/65   05/11/21 138/70   04/29/21 132/80   02/24/21 134/68           Hypercholesteremia  Hyperlipidemia:  Has history of hyperlipidemia.    The patient is  taking a medication for this.  Denies any significant side effects from his medication.      Latest labs reviewed:    Recent Labs   Lab Test 06/19/20  0845 04/17/19  0720 07/01/15  0752 07/01/15  0752   CHOL 135 144   < > 132   HDL 55 53   < > 49   LDL 64 67   < > 62*   TRIG 80 120   < > 105   CHOLHDLRATIO  --   --   --  2.7    < > = values in this interval not displayed.        Lab Results   Component Value Date    AST 23 07/01/2020          Coronary artery disease involving native coronary artery of native heart without angina pectoris  Prior of coronary artery disease as listed in medical history.  No current or recent cardiovascaulr symptoms.   No shortness of breath, no episodes of chest pain/pressure, no dyspnea on exertion, no changes in his abiliy to perform physical exertion or tasks.   Takes the same amount of time to perform similar physical tasks.    Most recent stress test showed: 2/12/2021      Gastroesophageal reflux disease with esophagitis, unspecified whether hemorrhage  GERD stable.  Taking meds as ordered, no reported side effects from medicines.  Eating properly to avoid sx.   No melena, no hematochezia, no diarrhea.  No unintended weigth loss.  Social history: no or minimal alcohol, nonsmoker, no or mild caffeine use, 81 mg ASA no NSAID's.      Stage 3a chronic kidney disease  History of chronic kidney disease stage 3, presumed due to HTN.  Reviewed most recent labs:  Lab Results   Component Value Date    CR 0.93 02/24/2021    CR 0.93 02/11/2021    CR 1.07 07/01/2020    CR 1.04 06/19/2020    CR 1.22 04/17/2019          Status post total hip replacement, right    Mild intermittent asthma without complication  Under excellent control.    Risks and Recommendations:  The patient has the following additional risks and recommendations for perioperative complications:   - No identified additional risk factors other than previously addressed    Medication Instructions:  Patient is to take all scheduled medications on the day of surgery EXCEPT for modifications listed below:   - aspirin: Discontinue aspirin 7-10 days prior to procedure to reduce bleeding risk. It should be resumed postoperatively.    - ACE/ARB: May be continued on the day of surgery.    - Beta Blockers: Continue taking on the day of surgery.   - Diuretics: HOLD on the day of surgery.   - Statins: Continue taking on the day of surgery.    - rescue Inhaler: Continue PRN. Bring to hospital on the day of surgery.    RECOMMENDATION:  APPROVAL GIVEN to proceed with proposed procedure, without further diagnostic evaluation.    Review of external notes as documented above     Subjective     HPI related to upcoming procedure: pain in the left shoulder that is slowly worsening and painful, had the right side done years ago      Health  Care Directive:  Patient has a Health Care Directive on file      Preoperative Review of :   reviewed - no record of controlled substances prescribed.      Status of Chronic Conditions:  See problem list for active medical problems.  Problems all longstanding and stable, except as noted/documented.  See ROS for pertinent symptoms related to these conditions.      Review of Systems  Constitutional, neuro, ENT, endocrine, pulmonary, cardiac, gastrointestinal, genitourinary, musculoskeletal, integument and psychiatric systems are negative, except as otherwise noted.    Patient Active Problem List    Diagnosis Date Noted     Status post total hip replacement, right 02/23/2021     Priority: Medium     CKD (chronic kidney disease) stage 3, GFR 30-59 ml/min 09/23/2019     Priority: Medium     Benign essential hypertension 12/19/2016     Priority: Medium     Asthma 07/10/2015     Priority: Medium     Allergies to animals, needs inhaler if visiting a client with pets.  Problem list name updated by automated process. Provider to review       Family history of ischemic heart disease      Priority: Medium     Health Care Home 10/24/2013     Priority: Medium     Esophageal reflux 05/17/2013     Priority: Medium     CAD (coronary artery disease) 05/16/2011     Priority: Medium     Last Stress in 7/6/2012,   History of coronary artery disease and stenting of his left anterior descending artery in 2002. His cardiac risk factors include hypertension and mixed lipidemia        Hypercholesteremia 02/27/2011     Priority: Medium      Past Medical History:   Diagnosis Date     ACP (advance care planning)     will bring copy in      Arthritis      Bruit      CAD (coronary artery disease) 2002    a stent     CKD (chronic kidney disease)      Esophageal reflux 5/17/2013     Family history of ischemic heart disease      Hyperlipidaemia      Hypertension      Past Surgical History:   Procedure Laterality Date     ARTHROPLASTY HIP Right  2/23/2021    Procedure: RIGHT TOTAL HIP ARTHROPLASTY;  Surgeon: Gera Davison MD;  Location: [facility] OR     CYSTECTOMY PILONIDAL       HEART CATH, ANGIOPLASTY  10/4/02    3.0 X 8 mm Velocity stent     ROTATOR CUFF REPAIR RT/LT  2009    Dr. Butler     Current Outpatient Medications   Medication Sig Dispense Refill     acetaminophen (TYLENOL) 325 MG tablet Take 2 tablets (650 mg) by mouth every 4 hours as needed for other (mild pain) 100 tablet 0     albuterol (VENTOLIN HFA) 108 (90 Base) MCG/ACT inhaler Inhale 2 puffs into the lungs every 6 hours as needed for shortness of breath / dyspnea or wheezing 3 Inhaler 3     amLODIPine (NORVASC) 5 MG tablet Take 1 tablet (5 mg) by mouth daily 90 tablet 3     Ascorbic Acid (VITAMIN C) 500 MG CAPS Take 500 mg by mouth 2 times daily (with meals)        aspirin (ASA) 325 MG EC tablet Take 1 tablet (325 mg) by mouth daily 30 tablet 0     hydrochlorothiazide (MICROZIDE) 12.5 MG capsule Take 1 capsule (12.5 mg) by mouth daily 90 capsule 3     hydrOXYzine (ATARAX) 10 MG tablet Take 1 tablet (10 mg) by mouth every 6 hours as needed for itching or anxiety (with pain, moderate pain) 30 tablet 0     ketoconazole (NIZORAL) 2 % external cream APPLY TOPICALLY DAILY (Patient taking differently: Apply topically daily as needed (to groin area) APPLY TOPICALLY DAILY) 60 g 0     lisinopril (ZESTRIL) 10 MG tablet TAKE 1 TABLET BY MOUTH ONCE DAILY. (Patient taking differently: Take 10 mg by mouth every morning TAKE 1 TABLET BY MOUTH ONCE DAILY.) 90 tablet 3     Multiple Vitamin (DAILY MULTIVITAMIN PO) Take 2 capsules by mouth every morning        Naphazoline-Pheniramine (OPCON-A OP) Place 1 drop into both eyes daily as needed (cat allergies)       naproxen (NAPROSYN) 500 MG tablet TAKE 1 TABLET BY MOUTH TWICE A DAY WITH MEALS 40 tablet 1     nitroGLYcerin (NITROSTAT) 0.4 MG sublingual tablet For chest pain place 1 tablet under the tongue every 5 minutes for 3 doses. If symptoms persist 5 minutes  after 1st dose call 911. 25 tablet 3     Probiotic Product (PROBIOTIC-10 PO) Take 1 capsule by mouth every morning        Psyllium (METAMUCIL FIBER PO) Take 1-2 teaspoonful by mouth daily as needed (mix with a glass of water and drink)       QVAR REDIHALER 80 MCG/ACT inhaler Inhale 1 puff into the lungs 2 times daily Only in winter or Around cats See Admin Instructions Only in winter or Around cats (Patient taking differently: Inhale 1 puff into the lungs daily as needed Only in winter or Around cats See Admin Instructions Only in winter or Around cats) 1 Inhaler 11     rosuvastatin (CRESTOR) 20 MG tablet Take 1 tablet (20 mg) by mouth daily 90 tablet 3     senna-docusate (SENOKOT-S/PERICOLACE) 8.6-50 MG tablet Take 1-2 tablets by mouth 2 times daily Take while on oral narcotics to prevent or treat constipation. 30 tablet 0     Vitamin D, Cholecalciferol, 1000 units CAPS Take 3,000 Units by mouth every morning        zinc gluconate 50 MG tablet Take 50 mg by mouth every morning          Allergies   Allergen Reactions     Cats      Simvastatin      achey        Social History     Tobacco Use     Smoking status: Never Smoker     Smokeless tobacco: Never Used   Substance Use Topics     Alcohol use: Yes     Alcohol/week: 0.8 standard drinks     Types: 1 Standard drinks or equivalent per week     Comment: occasionally     Family History   Problem Relation Age of Onset     C.A.D. Father      Cancer Father         bone     Cerebrovascular Disease Mother      Coronary Artery Disease Brother      Heart Disease Brother      History   Drug Use No         Objective     /72   Pulse 55   Temp 97.8  F (36.6  C) (Skin)   Wt 95.3 kg (210 lb)   SpO2 97%   BMI 27.71 kg/m      Physical Exam    GENERAL APPEARANCE: healthy, alert and no distress     EYES: EOMI,  PERRL     HENT: ear canals and TM's normal and nose and mouth without ulcers or lesions     NECK: no adenopathy, no asymmetry, masses, or scars and thyroid normal to  palpation     RESP: lungs clear to auscultation - no rales, rhonchi or wheezes     CV: regular rates and rhythm, normal S1 S2, no S3 or S4 and no murmur, click or rub     ABDOMEN:  soft, nontender, no HSM or masses and bowel sounds normal     MS: gait normal and decreased range of motion in the left shoulder     SKIN: no suspicious lesions or rashes     NEURO: Normal strength and tone, sensory exam grossly normal, mentation intact and speech normal     PSYCH: mentation appears normal. and affect normal/bright     LYMPHATICS: No cervical adenopathy    Recent Labs   Lab Test 02/24/21  0656 02/23/21  0746 02/11/21  1430 02/11/21  1345 07/01/20  1640   HGB 11.9*  --   --  14.5 14.9   PLT  --   --   --  232 178     --  138  --   --    POTASSIUM 4.0 3.6 4.1  --   --    CR 0.93  --  0.93  --   --         Diagnostics:  No labs were ordered during this visit.   No EKG this visit, completed in the last 90 days.    Revised Cardiac Risk Index (RCRI):  The patient has the following serious cardiovascular risks for perioperative complications:   - No serious cardiac risks = 0 points     RCRI Interpretation: 1 point: Class II (low risk - 0.9% complication rate)    Signed Electronically by: Willie Nam MD  Copy of this evaluation report is provided to requesting physician.

## 2021-06-21 NOTE — PATIENT INSTRUCTIONS

## 2021-06-22 ENCOUNTER — OFFICE VISIT (OUTPATIENT)
Dept: FAMILY MEDICINE | Facility: CLINIC | Age: 71
End: 2021-06-22

## 2021-06-22 VITALS
BODY MASS INDEX: 27.71 KG/M2 | SYSTOLIC BLOOD PRESSURE: 134 MMHG | OXYGEN SATURATION: 97 % | DIASTOLIC BLOOD PRESSURE: 72 MMHG | WEIGHT: 210 LBS | TEMPERATURE: 97.8 F | HEART RATE: 55 BPM

## 2021-06-22 DIAGNOSIS — I25.10 CORONARY ARTERY DISEASE INVOLVING NATIVE CORONARY ARTERY OF NATIVE HEART WITHOUT ANGINA PECTORIS: ICD-10-CM

## 2021-06-22 DIAGNOSIS — J45.20 MILD INTERMITTENT ASTHMA WITHOUT COMPLICATION: ICD-10-CM

## 2021-06-22 DIAGNOSIS — I10 BENIGN ESSENTIAL HYPERTENSION: ICD-10-CM

## 2021-06-22 DIAGNOSIS — N18.31 STAGE 3A CHRONIC KIDNEY DISEASE (H): ICD-10-CM

## 2021-06-22 DIAGNOSIS — Z96.641 STATUS POST TOTAL HIP REPLACEMENT, RIGHT: ICD-10-CM

## 2021-06-22 DIAGNOSIS — E78.00 HYPERCHOLESTEREMIA: ICD-10-CM

## 2021-06-22 DIAGNOSIS — Z01.818 PREOP GENERAL PHYSICAL EXAM: Primary | ICD-10-CM

## 2021-06-22 DIAGNOSIS — I10 ESSENTIAL HYPERTENSION: ICD-10-CM

## 2021-06-22 DIAGNOSIS — K21.00 GASTROESOPHAGEAL REFLUX DISEASE WITH ESOPHAGITIS, UNSPECIFIED WHETHER HEMORRHAGE: ICD-10-CM

## 2021-06-22 PROCEDURE — 99215 OFFICE O/P EST HI 40 MIN: CPT | Performed by: FAMILY MEDICINE

## 2021-06-22 NOTE — LETTER
My Asthma Action Plan    Name: Peter Hernandez II   YOB: 1950  Date: 6/22/2021   My doctor: Willie Nam MD   My clinic: [facility]        My Rescue Medicine:   Albuterol inhaler (Proair/Ventolin/Proventil HFA)  2-4 puffs EVERY 4 HOURS as needed. Use a spacer if recommended by your provider.   My Asthma Severity:   Intermittent / Exercise Induced  Know your asthma triggers: Patient is unaware of triggers             GREEN ZONE   Good Control    I feel good    No cough or wheeze    Can work, sleep and play without asthma symptoms       Take your asthma control medicine every day.     1. If exercise triggers your asthma, take your rescue medication    15 minutes before exercise or sports, and    During exercise if you have asthma symptoms  2. Spacer to use with inhaler: If you have a spacer, make sure to use it with your inhaler             YELLOW ZONE Getting Worse  I have ANY of these:    I do not feel good    Cough or wheeze    Chest feels tight    Wake up at night   1. Keep taking your Green Zone medications  2. Start taking your rescue medicine:    every 20 minutes for up to 1 hour. Then every 4 hours for 24-48 hours.  3. If you stay in the Yellow Zone for more than 12-24 hours, contact your doctor.  4. If you do not return to the Green Zone in 12-24 hours or you get worse, start taking your oral steroid medicine if prescribed by your provider.           RED ZONE Medical Alert - Get Help  I have ANY of these:    I feel awful    Medicine is not helping    Breathing getting harder    Trouble walking or talking    Nose opens wide to breathe       1. Take your rescue medicine NOW  2. If your provider has prescribed an oral steroid medicine, start taking it NOW  3. Call your doctor NOW  4. If you are still in the Red Zone after 20 minutes and you have not reached your doctor:    Take your rescue medicine again and    Call 911 or go to the emergency room right away    See your  regular doctor within 2 weeks of an Emergency Room or Urgent Care visit for follow-up treatment.          Annual Reminders:  Meet with Asthma Educator,  Flu Shot in the Fall, consider Pneumonia Vaccination for patients with asthma (aged 19 and older).    Pharmacy: [id number] [address] [facility]    Electronically signed by Willie Nam MD   Date: 06/22/21                    Asthma Triggers  How To Control Things That Make Your Asthma Worse    Triggers are things that make your asthma worse.  Look at the list below to help you find your triggers and   what you can do about them. You can help prevent asthma flare-ups by staying away from your triggers.      Trigger                                                          What you can do   Cigarette Smoke  Tobacco smoke can make asthma worse. Do not allow smoking in your home, car or around you.  Be sure no one smokes at a child s day care or school.  If you smoke, ask your health care provider for ways to help you quit.  Ask family members to quit too.  Ask your health care provider for a referral to Quit Plan to help you quit smoking, or call 1-737-106-PLAN.     Colds, Flu, Bronchitis  These are common triggers of asthma. Wash your hands often.  Don t touch your eyes, nose or mouth.  Get a flu shot every year.     Dust Mites  These are tiny bugs that live in cloth or carpet. They are too small to see. Wash sheets and blankets in hot water every week.   Encase pillows and mattress in dust mite proof covers.  Avoid having carpet if you can. If you have carpet, vacuum weekly.   Use a dust mask and HEPA vacuum.   Pollen and Outdoor Mold  Some people are allergic to trees, grass, or weed pollen, or molds. Try to keep your windows closed.  Limit time out doors when pollen count is high.   Ask you health care provider about taking medicine during allergy season.     Animal Dander  Some people are allergic to skin flakes, urine or saliva  from pets with fur or feathers. Keep pets with fur or feathers out of your home.    If you can t keep the pet outdoors, then keep the pet out of your bedroom.  Keep the bedroom door closed.  Keep pets off cloth furniture and away from stuffed toys.     Mice, Rats, and Cockroaches  Some people are allergic to the waste from these pests.   Cover food and garbage.  Clean up spills and food crumbs.  Store grease in the refrigerator.   Keep food out of the bedroom.   Indoor Mold  This can be a trigger if your home has high moisture. Fix leaking faucets, pipes, or other sources of water.   Clean moldy surfaces.  Dehumidify basement if it is damp and smelly.   Smoke, Strong Odors, and Sprays  These can reduce air quality. Stay away from strong odors and sprays, such as perfume, powder, hair spray, paints, smoke incense, paint, cleaning products, candles and new carpet.   Exercise or Sports  Some people with asthma have this trigger. Be active!  Ask your doctor about taking medicine before sports or exercise to prevent symptoms.    Warm up for 5-10 minutes before and after sports or exercise.     Other Triggers of Asthma  Cold air:  Cover your nose and mouth with a scarf.  Sometimes laughing or crying can be a trigger.  Some medicines and food can trigger asthma.

## 2021-06-23 ENCOUNTER — OFFICE VISIT (OUTPATIENT)
Dept: CARDIOLOGY | Facility: CLINIC | Age: 71
End: 2021-06-23
Payer: COMMERCIAL

## 2021-06-23 VITALS
HEART RATE: 67 BPM | HEIGHT: 74 IN | SYSTOLIC BLOOD PRESSURE: 117 MMHG | BODY MASS INDEX: 27.31 KG/M2 | DIASTOLIC BLOOD PRESSURE: 72 MMHG | WEIGHT: 212.8 LBS

## 2021-06-23 DIAGNOSIS — I10 BENIGN ESSENTIAL HYPERTENSION: ICD-10-CM

## 2021-06-23 DIAGNOSIS — E78.00 HYPERCHOLESTEREMIA: ICD-10-CM

## 2021-06-23 DIAGNOSIS — I25.10 CORONARY ARTERY DISEASE INVOLVING NATIVE CORONARY ARTERY OF NATIVE HEART WITHOUT ANGINA PECTORIS: Primary | ICD-10-CM

## 2021-06-23 DIAGNOSIS — I10 ESSENTIAL HYPERTENSION: ICD-10-CM

## 2021-06-23 PROCEDURE — 99214 OFFICE O/P EST MOD 30 MIN: CPT | Performed by: INTERNAL MEDICINE

## 2021-06-23 ASSESSMENT — MIFFLIN-ST. JEOR: SCORE: 1792.06

## 2021-06-23 NOTE — PROGRESS NOTES
Service Date: 06/23/2021    CARDIOLOGY FOLLOWUP VISIT     REFERRING PHYSICIAN:  Willie Nam MD     HISTORY OF PRESENT ILLNESS:  It is my pleasure to see your patient Peter Hernandez for a preoperative assessment prior to right shoulder surgery.  Mr. Hernandez is a 71-year-old patient with a past history of stenting of the proximal right coronary artery with mild coronary artery disease elsewhere.  This patient did have a right hip surgery earlier this year.  He did have a nuclear stress test performed in February of this year prior to his hip surgery, and this showed only a small area of nontransmural infarction with a mild degree of periinfarct ischemia.  This would be regarded as being a low risk for an intraoperative cardiac event.    The patient has no symptoms of angina pectoris, and in particular, he does not have symptoms of unstable angina pectoris, nor does he have evidence of decompensated congestive heart failure, malignant arrhythmias nor does he have severely stenotic valvular lesions.  Based upon this and the nuclear stress test, the patient would be regarded as a low risk for an intraoperative cardiac event.  He was seen in the emergency room recently for some swelling of his left leg.  He did have an ultrasound performed, which showed no evidence of DVT, and it was felt by the emergency room physician that he had some lymphedema.  He is using support stockings successfully for this.  His blood pressure was well controlled today at 117/72 with a pulse of 62 beats per minute.    IMPRESSION:    1.  The patient would be regarded as a low risk for an intraoperative cardiac event as described above.  No special monitoring is necessary in this patient.  2.  Excellent lipid profile 4 days ago with an LDL of 64, HDL of 55 and triglycerides of 80 with a total cholesterol of 135.  3.  Normotensive.    PLAN:  The patient can proceed ahead with surgery to his right shoulder with no special precautions necessary.   The cessation of aspirin and reintroduction of aspirin in the perioperative period will be at the discretion of the orthopedic surgeon with respect to bleeding issues.  The patient is due to see us back again in September.  As always, the patient has been told to contact me if he has any questions or any concerns.    cc:  Willie Nam MD   [facility] [address] [place] [address]      Gera Davison MD   [facility] [place] [address]    Braulio Aparicio MD, [facility]        D: 2021   T: 2021   MT: carrillo    Name:     NATALY CONTRERAS  MRN:      [id number]        Account:      [id number]   :      1950           Service Date: 2021       Document: [id number]

## 2021-06-23 NOTE — PROGRESS NOTES
HPI and Plan:   See dictation    No orders of the defined types were placed in this encounter.      Orders Placed This Encounter   Medications     aspirin (ASA) 81 MG EC tablet     Sig: Take 81 mg by mouth daily       There are no discontinued medications.      No diagnosis found.    CURRENT MEDICATIONS:  Current Outpatient Medications   Medication Sig Dispense Refill     acetaminophen (TYLENOL) 325 MG tablet Take 2 tablets (650 mg) by mouth every 4 hours as needed for other (mild pain) 100 tablet 0     albuterol (VENTOLIN HFA) 108 (90 Base) MCG/ACT inhaler Inhale 2 puffs into the lungs every 6 hours as needed for shortness of breath / dyspnea or wheezing 3 Inhaler 3     amLODIPine (NORVASC) 5 MG tablet Take 1 tablet (5 mg) by mouth daily 90 tablet 3     Ascorbic Acid (VITAMIN C) 500 MG CAPS Take 500 mg by mouth 2 times daily (with meals)        aspirin (ASA) 81 MG EC tablet Take 81 mg by mouth daily       hydrochlorothiazide (MICROZIDE) 12.5 MG capsule Take 1 capsule (12.5 mg) by mouth daily 90 capsule 3     ketoconazole (NIZORAL) 2 % external cream APPLY TOPICALLY DAILY (Patient taking differently: Apply topically daily as needed (to groin area) APPLY TOPICALLY DAILY) 60 g 0     lisinopril (ZESTRIL) 10 MG tablet TAKE 1 TABLET BY MOUTH ONCE DAILY. (Patient taking differently: Take 10 mg by mouth every morning TAKE 1 TABLET BY MOUTH ONCE DAILY.) 90 tablet 3     Multiple Vitamin (DAILY MULTIVITAMIN PO) Take 2 capsules by mouth every morning        Naphazoline-Pheniramine (OPCON-A OP) Place 1 drop into both eyes daily as needed (cat allergies)       naproxen (NAPROSYN) 500 MG tablet TAKE 1 TABLET BY MOUTH TWICE A DAY WITH MEALS 40 tablet 1     nitroGLYcerin (NITROSTAT) 0.4 MG sublingual tablet For chest pain place 1 tablet under the tongue every 5 minutes for 3 doses. If symptoms persist 5 minutes after 1st dose call 911. 25 tablet 3     Probiotic Product (PROBIOTIC-10 PO) Take 1 capsule by mouth every morning         Psyllium (METAMUCIL FIBER PO) Take 1-2 teaspoonful by mouth daily as needed (mix with a glass of water and drink)       QVAR REDIHALER 80 MCG/ACT inhaler Inhale 1 puff into the lungs 2 times daily Only in winter or Around cats See Admin Instructions Only in winter or Around cats (Patient taking differently: Inhale 1 puff into the lungs daily as needed Only in winter or Around cats See Admin Instructions Only in winter or Around cats) 1 Inhaler 11     rosuvastatin (CRESTOR) 20 MG tablet Take 1 tablet (20 mg) by mouth daily 90 tablet 3     senna-docusate (SENOKOT-S/PERICOLACE) 8.6-50 MG tablet Take 1-2 tablets by mouth 2 times daily Take while on oral narcotics to prevent or treat constipation. 30 tablet 0     Vitamin D, Cholecalciferol, 1000 units CAPS Take 3,000 Units by mouth every morning        zinc gluconate 50 MG tablet Take 50 mg by mouth every morning        aspirin (ASA) 325 MG EC tablet Take 1 tablet (325 mg) by mouth daily (Patient not taking: Reported on 6/23/2021) 30 tablet 0     hydrOXYzine (ATARAX) 10 MG tablet Take 1 tablet (10 mg) by mouth every 6 hours as needed for itching or anxiety (with pain, moderate pain) (Patient not taking: Reported on 6/23/2021) 30 tablet 0       ALLERGIES     Allergies   Allergen Reactions     Cats      Simvastatin      achey       PAST MEDICAL HISTORY:  Past Medical History:   Diagnosis Date     ACP (advance care planning)     will bring copy in      Arthritis      Bruit      CAD (coronary artery disease) 2002    a stent     CKD (chronic kidney disease)      Esophageal reflux 5/17/2013     Family history of ischemic heart disease      Hyperlipidaemia      Hypertension        PAST SURGICAL HISTORY:  Past Surgical History:   Procedure Laterality Date     ARTHROPLASTY HIP Right 2/23/2021    Procedure: RIGHT TOTAL HIP ARTHROPLASTY;  Surgeon: Gera Davison MD;  Location: [facility] OR     CYSTECTOMY PILONIDAL       HEART CATH, ANGIOPLASTY  10/4/02    3.0 X 8 mm Velocity stent      ROTATOR CUFF REPAIR RT/LT  2009    Dr. Butler       FAMILY HISTORY:  Family History   Problem Relation Age of Onset     C.A.HUSSEIN Father      Cancer Father         bone     Cerebrovascular Disease Mother      Coronary Artery Disease Brother      Heart Disease Brother        SOCIAL HISTORY:  Social History     Socioeconomic History     Marital status:      Spouse name: None     Number of children: None     Years of education: None     Highest education level: None   Occupational History     Occupation: Finance   Social Needs     Financial resource strain: None     Food insecurity     Worry: None     Inability: None     Transportation needs     Medical: None     Non-medical: None   Tobacco Use     Smoking status: Never Smoker     Smokeless tobacco: Never Used   Substance and Sexual Activity     Alcohol use: Yes     Alcohol/week: 0.8 standard drinks     Types: 1 Standard drinks or equivalent per week     Comment: occasionally     Drug use: No     Sexual activity: Yes     Partners: Female   Lifestyle     Physical activity     Days per week: None     Minutes per session: None     Stress: None   Relationships     Social connections     Talks on phone: None     Gets together: None     Attends [employer] service: None     Active member of club or organization: None     Attends meetings of clubs or organizations: None     Relationship status: None     Intimate partner violence     Fear of current or ex partner: None     Emotionally abused: None     Physically abused: None     Forced sexual activity: None   Other Topics Concern     Parent/sibling w/ CABG, MI or angioplasty before 65F 55M? No      Service Not Asked     Blood Transfusions Not Asked     Caffeine Concern Not Asked     Occupational Exposure Not Asked     Hobby Hazards Not Asked     Sleep Concern Yes     Comment: occ     Stress Concern No     Weight Concern Not Asked     Special Diet Not Asked     Back Care Not Asked     Exercise Yes     Comment: walks  "daily     Bike Helmet Not Asked     Seat Belt Not Asked     Self-Exams Not Asked   Social History Narrative     None       Review of Systems:  Skin:  Negative       Eyes:  Positive for glasses    ENT:  Negative      Respiratory:  Positive for dyspnea on exertion asthma   Cardiovascular:    edema;Positive for    Gastroenterology: Negative      Genitourinary:  not assessed nocturia(X1)    Musculoskeletal:  Positive for gout;joint pain having right hip replacement 2/23/21  Neurologic:  Negative      Psychiatric:  Negative      Heme/Lymph/Imm:  Negative allergies    Endocrine:  Negative        Physical Exam:  Vitals: /72   Pulse 67   Ht 1.867 m (6' 1.5\")   Wt 96.5 kg (212 lb 12.8 oz)   BMI 27.69 kg/m      Constitutional:  cooperative, alert and oriented, well developed, well nourished, in no acute distress overweight      Skin:  warm and dry to the touch, no apparent skin lesions or masses noted          Head:  normocephalic, no masses or lesions        Eyes:  pupils equal and round, conjunctivae and lids unremarkable, sclera white, no xanthalasma, EOMS intact, no nystagmus        Lymph:No Cervical lymphadenopathy present     ENT:  no pallor or cyanosis, dentition good        Neck:  carotid pulses are full and equal bilaterally, JVP normal, no carotid bruit        Respiratory:  normal breath sounds, clear to auscultation, normal A-P diameter, normal symmetry, normal respiratory excursion, no use of accessory muscles         Cardiac: regular rhythm;normal S1 and S2;apical impulse not displaced;no murmurs, gallops or rubs detected   S4            pulses full and equal, no bruits auscultated                                        GI:  not assessed this visit        Extremities and Muscular Skeletal:  no deformities, clubbing, cyanosis, erythema observed;no edema              Neurological:  no gross motor deficits;affect appropriate        Psych:  Alert and Oriented x 3        CC  No referring provider defined for " this encounter.

## 2021-06-23 NOTE — LETTER
6/23/2021    Willie Nam MD  [address]  [facility] [address]    RE: Peter Hernandez II       Dear Colleague,    I had the pleasure of seeing Peter Hernandez II in the [facility].    HPI and Plan:   See dictation    No orders of the defined types were placed in this encounter.      Orders Placed This Encounter   Medications     aspirin (ASA) 81 MG EC tablet     Sig: Take 81 mg by mouth daily       There are no discontinued medications.      No diagnosis found.    CURRENT MEDICATIONS:  Current Outpatient Medications   Medication Sig Dispense Refill     acetaminophen (TYLENOL) 325 MG tablet Take 2 tablets (650 mg) by mouth every 4 hours as needed for other (mild pain) 100 tablet 0     albuterol (VENTOLIN HFA) 108 (90 Base) MCG/ACT inhaler Inhale 2 puffs into the lungs every 6 hours as needed for shortness of breath / dyspnea or wheezing 3 Inhaler 3     amLODIPine (NORVASC) 5 MG tablet Take 1 tablet (5 mg) by mouth daily 90 tablet 3     Ascorbic Acid (VITAMIN C) 500 MG CAPS Take 500 mg by mouth 2 times daily (with meals)        aspirin (ASA) 81 MG EC tablet Take 81 mg by mouth daily       hydrochlorothiazide (MICROZIDE) 12.5 MG capsule Take 1 capsule (12.5 mg) by mouth daily 90 capsule 3     ketoconazole (NIZORAL) 2 % external cream APPLY TOPICALLY DAILY (Patient taking differently: Apply topically daily as needed (to groin area) APPLY TOPICALLY DAILY) 60 g 0     lisinopril (ZESTRIL) 10 MG tablet TAKE 1 TABLET BY MOUTH ONCE DAILY. (Patient taking differently: Take 10 mg by mouth every morning TAKE 1 TABLET BY MOUTH ONCE DAILY.) 90 tablet 3     Multiple Vitamin (DAILY MULTIVITAMIN PO) Take 2 capsules by mouth every morning        Naphazoline-Pheniramine (OPCON-A OP) Place 1 drop into both eyes daily as needed (cat allergies)       naproxen (NAPROSYN) 500 MG tablet TAKE 1 TABLET BY MOUTH TWICE A DAY WITH MEALS 40 tablet 1     nitroGLYcerin  (NITROSTAT) 0.4 MG sublingual tablet For chest pain place 1 tablet under the tongue every 5 minutes for 3 doses. If symptoms persist 5 minutes after 1st dose call 911. 25 tablet 3     Probiotic Product (PROBIOTIC-10 PO) Take 1 capsule by mouth every morning        Psyllium (METAMUCIL FIBER PO) Take 1-2 teaspoonful by mouth daily as needed (mix with a glass of water and drink)       QVAR REDIHALER 80 MCG/ACT inhaler Inhale 1 puff into the lungs 2 times daily Only in winter or Around cats See Admin Instructions Only in winter or Around cats (Patient taking differently: Inhale 1 puff into the lungs daily as needed Only in winter or Around cats See Admin Instructions Only in winter or Around cats) 1 Inhaler 11     rosuvastatin (CRESTOR) 20 MG tablet Take 1 tablet (20 mg) by mouth daily 90 tablet 3     senna-docusate (SENOKOT-S/PERICOLACE) 8.6-50 MG tablet Take 1-2 tablets by mouth 2 times daily Take while on oral narcotics to prevent or treat constipation. 30 tablet 0     Vitamin D, Cholecalciferol, 1000 units CAPS Take 3,000 Units by mouth every morning        zinc gluconate 50 MG tablet Take 50 mg by mouth every morning        aspirin (ASA) 325 MG EC tablet Take 1 tablet (325 mg) by mouth daily (Patient not taking: Reported on 6/23/2021) 30 tablet 0     hydrOXYzine (ATARAX) 10 MG tablet Take 1 tablet (10 mg) by mouth every 6 hours as needed for itching or anxiety (with pain, moderate pain) (Patient not taking: Reported on 6/23/2021) 30 tablet 0       ALLERGIES     Allergies   Allergen Reactions     Cats      Simvastatin      achey       PAST MEDICAL HISTORY:  Past Medical History:   Diagnosis Date     ACP (advance care planning)     will bring copy in      Arthritis      Bruit      CAD (coronary artery disease) 2002    a stent     CKD (chronic kidney disease)      Esophageal reflux 5/17/2013     Family history of ischemic heart disease      Hyperlipidaemia      Hypertension        PAST SURGICAL HISTORY:  Past Surgical  History:   Procedure Laterality Date     ARTHROPLASTY HIP Right 2/23/2021    Procedure: RIGHT TOTAL HIP ARTHROPLASTY;  Surgeon: Gera Davison MD;  Location: [facility] OR     CYSTECTOMY PILONIDAL       HEART CATH, ANGIOPLASTY  10/4/02    3.0 X 8 mm Velocity stent     ROTATOR CUFF REPAIR RT/LT  2009    Dr. Butler       FAMILY HISTORY:  Family History   Problem Relation Age of Onset     C.A.D. Father      Cancer Father         bone     Cerebrovascular Disease Mother      Coronary Artery Disease Brother      Heart Disease Brother        SOCIAL HISTORY:  Social History     Socioeconomic History     Marital status:      Spouse name: None     Number of children: None     Years of education: None     Highest education level: None   Occupational History     Occupation: Finance   Social Needs     Financial resource strain: None     Food insecurity     Worry: None     Inability: None     Transportation needs     Medical: None     Non-medical: None   Tobacco Use     Smoking status: Never Smoker     Smokeless tobacco: Never Used   Substance and Sexual Activity     Alcohol use: Yes     Alcohol/week: 0.8 standard drinks     Types: 1 Standard drinks or equivalent per week     Comment: occasionally     Drug use: No     Sexual activity: Yes     Partners: Female   Lifestyle     Physical activity     Days per week: None     Minutes per session: None     Stress: None   Relationships     Social connections     Talks on phone: None     Gets together: None     Attends [employer] service: None     Active member of club or organization: None     Attends meetings of clubs or organizations: None     Relationship status: None     Intimate partner violence     Fear of current or ex partner: None     Emotionally abused: None     Physically abused: None     Forced sexual activity: None   Other Topics Concern     Parent/sibling w/ CABG, MI or angioplasty before 65F 55M? No      Service Not Asked     Blood Transfusions Not Asked      "Caffeine Concern Not Asked     Occupational Exposure Not Asked     Hobby Hazards Not Asked     Sleep Concern Yes     Comment: occ     Stress Concern No     Weight Concern Not Asked     Special Diet Not Asked     Back Care Not Asked     Exercise Yes     Comment: walks daily     Bike Helmet Not Asked     Seat Belt Not Asked     Self-Exams Not Asked   Social History Narrative     None       Review of Systems:  Skin:  Negative       Eyes:  Positive for glasses    ENT:  Negative      Respiratory:  Positive for dyspnea on exertion asthma   Cardiovascular:    edema;Positive for    Gastroenterology: Negative      Genitourinary:  not assessed nocturia(X1)    Musculoskeletal:  Positive for gout;joint pain having right hip replacement 2/23/21  Neurologic:  Negative      Psychiatric:  Negative      Heme/Lymph/Imm:  Negative allergies    Endocrine:  Negative        Physical Exam:  Vitals: /72   Pulse 67   Ht 1.867 m (6' 1.5\")   Wt 96.5 kg (212 lb 12.8 oz)   BMI 27.69 kg/m      Constitutional:  cooperative, alert and oriented, well developed, well nourished, in no acute distress overweight      Skin:  warm and dry to the touch, no apparent skin lesions or masses noted          Head:  normocephalic, no masses or lesions        Eyes:  pupils equal and round, conjunctivae and lids unremarkable, sclera white, no xanthalasma, EOMS intact, no nystagmus        Lymph:No Cervical lymphadenopathy present     ENT:  no pallor or cyanosis, dentition good        Neck:  carotid pulses are full and equal bilaterally, JVP normal, no carotid bruit        Respiratory:  normal breath sounds, clear to auscultation, normal A-P diameter, normal symmetry, normal respiratory excursion, no use of accessory muscles         Cardiac: regular rhythm;normal S1 and S2;apical impulse not displaced;no murmurs, gallops or rubs detected   S4            pulses full and equal, no bruits auscultated                                        GI:  not assessed " this visit        Extremities and Muscular Skeletal:  no deformities, clubbing, cyanosis, erythema observed;no edema              Neurological:  no gross motor deficits;affect appropriate        Psych:  Alert and Oriented x 3      Thank you for allowing me to participate in the care of your patient.      Sincerely,     Braulio Blanca MD, MD     [facility]  cc:   No referring provider defined for this encounter.

## 2021-06-24 NOTE — PROGRESS NOTES
6/22/21 faxed preop to [facility]   @ [phone]    Maxwell Bedolla,   [facility]  [phone]

## 2021-06-29 ENCOUNTER — TELEPHONE (OUTPATIENT)
Dept: FAMILY MEDICINE | Facility: CLINIC | Age: 71
End: 2021-06-29

## 2021-06-29 DIAGNOSIS — I10 BENIGN ESSENTIAL HYPERTENSION: ICD-10-CM

## 2021-06-29 DIAGNOSIS — Z01.818 PREOP TESTING: Primary | ICD-10-CM

## 2021-06-29 NOTE — CONFIDENTIAL NOTE
[place], nurse at [facility], calls stating need patient to have BMP done prior to his 7/1/21 shoulder surgery due to on BP meds including HCTZ, amlodipine and lisinopril. Needs BMP within 30 days.   preop with Dr. Nam done 6/22/21.   Last BMP 2/2021   Plan: okay per Dr. Nam for BMP and CBC to be done today. Orders placed.  We will need to fax result to surgery center at [phone]. We have left a message for patient to call clinic to schedule labs for today.     06/30/21 10:01 AM patient called back -- needs labs done stat today as has to be at surgery center at 6am tomorrow. Orders placed for labs to be done at [facility] outpatient lab today. Patient will call now to schedule appt.     6/30/21 1230pm lab results received and faxed to surgery center. Patient informed labs normal/stable.  Argelia Abreu RN

## 2021-06-30 ENCOUNTER — HOSPITAL ENCOUNTER (OUTPATIENT)
Dept: LAB | Facility: CLINIC | Age: 71
Discharge: HOME | End: 2021-06-30
Attending: FAMILY MEDICINE | Admitting: FAMILY MEDICINE
Payer: COMMERCIAL

## 2021-06-30 DIAGNOSIS — I10 BENIGN ESSENTIAL HYPERTENSION: ICD-10-CM

## 2021-06-30 DIAGNOSIS — Z01.818 PREOP TESTING: ICD-10-CM

## 2021-06-30 LAB
ANION GAP SERPL CALCULATED.3IONS-SCNC: 3 MMOL/L (ref 3–14)
BUN SERPL-MCNC: 18 MG/DL (ref 7–30)
CALCIUM SERPL-MCNC: 9 MG/DL (ref 8.5–10.1)
CHLORIDE SERPL-SCNC: 106 MMOL/L (ref 94–109)
CO2 SERPL-SCNC: 29 MMOL/L (ref 20–32)
CREAT SERPL-MCNC: 1.21 MG/DL (ref 0.66–1.25)
ERYTHROCYTE [DISTWIDTH] IN BLOOD BY AUTOMATED COUNT: 14.1 % (ref 10–15)
GFR SERPL CREATININE-BSD FRML MDRD: 61 ML/MIN/{1.73_M2}
GLUCOSE SERPL-MCNC: 91 MG/DL (ref 70–99)
HCT VFR BLD AUTO: 43.7 % (ref 40–53)
HGB BLD-MCNC: 14.1 G/DL (ref 13.3–17.7)
MCH RBC QN AUTO: 29 PG (ref 26.5–33)
MCHC RBC AUTO-ENTMCNC: 32.3 G/DL (ref 31.5–36.5)
MCV RBC AUTO: 90 FL (ref 78–100)
PLATELET # BLD AUTO: 168 10E9/L (ref 150–450)
POTASSIUM SERPL-SCNC: 4.4 MMOL/L (ref 3.4–5.3)
RBC # BLD AUTO: 4.86 10E12/L (ref 4.4–5.9)
SODIUM SERPL-SCNC: 138 MMOL/L (ref 133–144)
WBC # BLD AUTO: 8.2 10E9/L (ref 4–11)

## 2021-06-30 PROCEDURE — 80048 BASIC METABOLIC PNL TOTAL CA: CPT | Performed by: FAMILY MEDICINE

## 2021-06-30 PROCEDURE — 36415 COLL VENOUS BLD VENIPUNCTURE: CPT | Performed by: FAMILY MEDICINE

## 2021-06-30 PROCEDURE — 85027 COMPLETE CBC AUTOMATED: CPT | Performed by: FAMILY MEDICINE

## 2021-07-26 DIAGNOSIS — M79.672 LEFT FOOT PAIN: ICD-10-CM

## 2021-07-26 RX ORDER — NAPROXEN 500 MG/1
TABLET ORAL
Qty: 40 TABLET | Refills: 3 | Status: SHIPPED | OUTPATIENT
Start: 2021-07-26 | End: 2021-10-25

## 2021-09-03 DIAGNOSIS — I25.10 CORONARY ARTERY DISEASE INVOLVING NATIVE CORONARY ARTERY OF NATIVE HEART WITHOUT ANGINA PECTORIS: Primary | ICD-10-CM

## 2021-09-03 DIAGNOSIS — I25.10 CORONARY ARTERY DISEASE INVOLVING NATIVE CORONARY ARTERY OF NATIVE HEART WITHOUT ANGINA PECTORIS: ICD-10-CM

## 2021-09-03 DIAGNOSIS — E78.00 HYPERCHOLESTEREMIA: ICD-10-CM

## 2021-09-08 NOTE — PROGRESS NOTES
SUBJECTIVE:   Peter Hernandez II is a 71 year old male who presents for Preventive Visit.    GERD stable.  Taking meds as ordered, no reported side effects from medicines.  Eating properly to avoid sx.   No melena, no hematochezia, no diarrhea.  No unintended weigth loss.  Social history: no or minimal alcohol, nonsmoker, no or mild caffeine use.    Prior of coronary artery disease as listed in medical history.  No current or recent cardiovascaulr symptoms.   No shortness of breath, no episodes of chest pain/pressure, no dyspnea on exertion, no changes in his abiliy to perform physical exertion or tasks.  Takes the same amount of time to perform similar physical tasks.      Chronic kidney disease due to HTN and DM.  Check urine for protein.   Patient is on ACE/ARB.  Patient is on a statin.  Told the patient to avoid NSAIDs if at all possible.   Will monitor closely and send to Nephrologist if renal function continues to decline.      Essential Hypertension   Remains well controlled when checked out of clinic.   he has not experienced any significant side effects from medications for hypertension.    NO active cardiac complaints or symptoms with exercise.  Current medications for treatment:  ACE inhibitors (Lisinopril, Ramipril,Captopril,Benazepril) and Calcium channel blocker (Amlodipine, Diltiazem,Verapamil)    Reviewed last 6 BP readings in chart:  BP Readings from Last 6 Encounters:   09/13/21 135/60   06/23/21 117/72   06/22/21 134/72   05/24/21 134/78   05/13/21 128/65   05/11/21 138/70       Lymphedema:  Much improved with walking!        Patient has been advised of split billing requirements and indicates understanding: yes  Are you in the first 12 months of your Medicare Part B coverage?  No    Physical Health:    In general, how would you rate your overall physical health? excellent    Outside of work, how many days during the week do you exercise? 6-7 days/week    Outside of work, approximately how many  "minutes a day do you exercise?15-30 minutes    If you drink alcohol do you typically have >3 drinks per day or >7 drinks per week? No    Do you usually eat at least 4 servings of fruit and vegetables a day, include whole grains & fiber and avoid regularly eating high fat or \"junk\" foods? NO    Do you have any problems taking medications regularly?  No    Do you have any side effects from medications? none    Needs assistance for the following daily activities: no assistance needed    Which of the following safety concerns are present in your home?  none identified     Hearing impairment: No    In the past 6 months, have you been bothered by leaking of urine? no  HEARING FREQUENCY:   Right Ear:     500 Hz :  pass   1000 Hz:  pass   2000 Hz:  pass   4000 Hz:  pass  Left Ear:     500 Hz :  pass   1000 Hz:  pass   2000 Hz:  pass   4000 Hz:  fail  [facility] 2021  Mental Health:    In general, how would you rate your overall mental or emotional health? excellent  PHQ-2 Score:  0    Do you feel safe in your environment? YES    Have you ever done Advance Care Planning? (For example, a Health Directive, POLST, or a discussion with a medical provider or your loved ones about your wishes): Yes, advance care planning is on file.    Additional concerns to address?  Colonoscopy     Fall risk:  Fallen 2 or more times in the past year?: No  Any fall with injury in the past year?: No    Cognitive Screenin) Repeat 3 items (Leader, Season, Table)    2) Clock draw: NORMAL  3) 3 item recall: Recalls 3 objects  Results: 3 items recalled: COGNITIVE IMPAIRMENT LESS LIKELY    Mini-CogTM Copyright TYLER Ashley. Licensed by the author for use in [facility]; reprinted with permission (shannon@.[facility]). All rights reserved.      Do you have sleep apnea, excessive snoring or daytime drowsiness?: no        -------------------------------------    Reviewed and updated as needed this visit by clinical staff          " "       Reviewed and updated as needed this visit by Provider                Social History     Tobacco Use     Smoking status: Never Smoker     Smokeless tobacco: Never Used   Substance Use Topics     Alcohol use: Yes     Alcohol/week: 0.8 standard drinks     Types: 1 Standard drinks or equivalent per week     Comment: occasionally                           Current providers sharing in care for this patient include:   Patient Care Team:  Willie Nam MD as PCP - General (Family Practice)  Braulio Blanca MD as MD (Cardiology)  Willie Nam MD as Assigned PCP  Braulio Blanca MD as Assigned Heart and Vascular Provider    The following health maintenance items are reviewed in Epic and correct as of today:  Health Maintenance   Topic Date Due     LIPID  06/19/2021     INFLUENZA VACCINE (1) 09/01/2021     COLORECTAL CANCER SCREENING  07/18/2021     MEDICARE ANNUAL WELLNESS VISIT  10/08/2021     FALL RISK ASSESSMENT  10/08/2021     ASTHMA CONTROL TEST  11/11/2021     ASTHMA ACTION PLAN  06/22/2022     DTAP/TDAP/TD IMMUNIZATION (4 - Td or Tdap) 10/24/2023     ADVANCE CARE PLANNING  03/25/2026     HEPATITIS C SCREENING  Completed     PHQ-2  Completed     Pneumococcal Vaccine: 65+ Years  Completed     ZOSTER IMMUNIZATION  Completed     AORTIC ANEURYSM SCREENING (SYSTEM ASSIGNED)  Completed     COVID-19 Vaccine  Completed     IPV IMMUNIZATION  Aged Out     MENINGITIS IMMUNIZATION  Aged Out     HEPATITIS B IMMUNIZATION  Aged Out     Lab work is in process      ROS:  Constitutional, HEENT, cardiovascular, pulmonary, GI, , musculoskeletal, neuro, skin, endocrine and psych systems are negative, except as otherwise noted.    OBJECTIVE:   There were no vitals taken for this visit. Estimated body mass index is 27.69 kg/m  as calculated from the following:    Height as of 6/23/21: 1.867 m (6' 1.5\").    Weight as of 6/23/21: 96.5 kg (212 lb 12.8 oz).  EXAM:   GENERAL: healthy, alert and no " distress  EYES: Eyes grossly normal to inspection, PERRL and conjunctivae and sclerae normal  HENT: ear canals and TM's normal, nose and mouth without ulcers or lesions  NECK: no adenopathy, no asymmetry, masses, or scars and thyroid normal to palpation  RESP: lungs clear to auscultation - no rales, rhonchi or wheezes  CV: regular rate and rhythm, normal S1 S2, no S3 or S4, no murmur, click or rub, no peripheral edema and peripheral pulses strong  ABDOMEN: soft, nontender, no hepatosplenomegaly, no masses and bowel sounds normal   (male): normal male genitalia without lesions or urethral discharge, no hernia  RECTAL: normal sphincter tone, no rectal masses, prostate normal size, smooth, nontender without nodules or masses  MS: no gross musculoskeletal defects noted, no edema  SKIN: no suspicious lesions or rashes  NEURO: Normal strength and tone, mentation intact and speech normal  PSYCH: mentation appears normal, affect normal/bright    Diagnostic Test Results:  Labs reviewed in Epic    ASSESSMENT / PLAN:   Peter was seen today for physical and hearing screening.    Diagnoses and all orders for this visit:    Medicare annual wellness visit, subsequent    Benign essential hypertension  -     MS ART PRESS WAVEFORM ANALYS CENTRAL ART PRESSURE  -     amLODIPine (NORVASC) 5 MG tablet; Take 1 tablet (5 mg) by mouth daily  -     hydrochlorothiazide (MICROZIDE) 12.5 MG capsule; Take 1 capsule (12.5 mg) by mouth daily  -     lisinopril (ZESTRIL) 10 MG tablet; Take 1 tablet (10 mg) by mouth every morning TAKE 1 TABLET BY MOUTH ONCE DAILY.    Gastroesophageal reflux disease with esophagitis, unspecified whether hemorrhage    Stage 3a chronic kidney disease    Coronary artery disease involving native coronary artery of native heart without angina pectoris    Hypercholesteremia  -     rosuvastatin (CRESTOR) 20 MG tablet; Take 1 tablet (20 mg) by mouth daily    Lymphedema        Patient has been advised of split billing  "requirements and indicates understanding: Yes    COUNSELING:  Reviewed preventive health counseling, as reflected in patient instructions       Regular exercise       Healthy diet/nutrition    Estimated body mass index is 27.69 kg/m  as calculated from the following:    Height as of 6/23/21: 1.867 m (6' 1.5\").    Weight as of 6/23/21: 96.5 kg (212 lb 12.8 oz).        He reports that he has never smoked. He has never used smokeless tobacco.    Appropriate preventive services were discussed with this patient, including applicable screening as appropriate for cardiovascular disease, diabetes, osteopenia/osteoporosis, and glaucoma.  As appropriate for age/gender, discussed screening for colorectal cancer, prostate cancer, breast cancer, and cervical cancer. Checklist reviewing preventive services available has been given to the patient.    Reviewed patients plan of care and provided an AVS. The Basic Care Plan (routine screening as documented in Health Maintenance) for Peter meets the Care Plan requirement. This Care Plan has been established and reviewed with the Patient.    Counseling Resources:  ATP IV Guidelines  Pooled Cohorts Equation Calculator  Breast Cancer Risk Calculator  BRCA-Related Cancer Risk Assessment: FHS-7 Tool  FRAX Risk Assessment  ICSI Preventive Guidelines  Dietary Guidelines for Americans, 2010  USDA's MyPlate  ASA Prophylaxis  Lung CA Screening    Willie Nam MD  [facility]  "

## 2021-09-10 ENCOUNTER — MEDICAL CORRESPONDENCE (OUTPATIENT)
Dept: HEALTH INFORMATION MANAGEMENT | Facility: CLINIC | Age: 71
End: 2021-09-10

## 2021-09-10 DIAGNOSIS — I25.10 CORONARY ARTERY DISEASE INVOLVING NATIVE CORONARY ARTERY OF NATIVE HEART WITHOUT ANGINA PECTORIS: Primary | ICD-10-CM

## 2021-09-13 ENCOUNTER — TRANSFERRED RECORDS (OUTPATIENT)
Dept: HEALTH INFORMATION MANAGEMENT | Facility: CLINIC | Age: 71
End: 2021-09-13

## 2021-09-13 ENCOUNTER — OFFICE VISIT (OUTPATIENT)
Dept: FAMILY MEDICINE | Facility: CLINIC | Age: 71
End: 2021-09-13

## 2021-09-13 VITALS
RESPIRATION RATE: 18 BRPM | WEIGHT: 210.8 LBS | OXYGEN SATURATION: 97 % | HEART RATE: 58 BPM | HEIGHT: 73 IN | DIASTOLIC BLOOD PRESSURE: 60 MMHG | SYSTOLIC BLOOD PRESSURE: 135 MMHG | BODY MASS INDEX: 27.94 KG/M2

## 2021-09-13 VITALS — SYSTOLIC BLOOD PRESSURE: 132 MMHG | DIASTOLIC BLOOD PRESSURE: 80 MMHG | HEART RATE: 69 BPM | OXYGEN SATURATION: 96 %

## 2021-09-13 DIAGNOSIS — I89.0 LYMPHEDEMA: ICD-10-CM

## 2021-09-13 DIAGNOSIS — Z12.5 SCREENING FOR PROSTATE CANCER: ICD-10-CM

## 2021-09-13 DIAGNOSIS — K21.00 GASTROESOPHAGEAL REFLUX DISEASE WITH ESOPHAGITIS, UNSPECIFIED WHETHER HEMORRHAGE: ICD-10-CM

## 2021-09-13 DIAGNOSIS — N18.31 STAGE 3A CHRONIC KIDNEY DISEASE (H): ICD-10-CM

## 2021-09-13 DIAGNOSIS — E78.00 HYPERCHOLESTEREMIA: ICD-10-CM

## 2021-09-13 DIAGNOSIS — Z00.00 MEDICARE ANNUAL WELLNESS VISIT, SUBSEQUENT: Primary | ICD-10-CM

## 2021-09-13 DIAGNOSIS — I10 BENIGN ESSENTIAL HYPERTENSION: ICD-10-CM

## 2021-09-13 DIAGNOSIS — I25.10 CORONARY ARTERY DISEASE INVOLVING NATIVE CORONARY ARTERY OF NATIVE HEART WITHOUT ANGINA PECTORIS: ICD-10-CM

## 2021-09-13 LAB
% GRANULOCYTES: 81.4 % (ref 42.2–75.2)
HCT VFR BLD AUTO: 41.1 % (ref 39–51)
HEMOGLOBIN: 14.5 G/DL (ref 13.4–17.5)
LYMPHOCYTES NFR BLD AUTO: 14.2 % (ref 20.5–51.1)
MCH RBC QN AUTO: 30.5 PG (ref 27–31)
MCHC RBC AUTO-ENTMCNC: 35.4 G/DL (ref 33–37)
MCV RBC AUTO: 86.2 FL (ref 80–100)
MONOCYTES NFR BLD AUTO: 4.4 % (ref 1.7–9.3)
PLATELET # BLD AUTO: 130 K/UL (ref 140–450)
RBC # BLD AUTO: 4.77 X10/CMM (ref 4.2–5.9)
WBC # BLD AUTO: 9.5 X10/CMM (ref 3.8–11)

## 2021-09-13 PROCEDURE — 99213 OFFICE O/P EST LOW 20 MIN: CPT | Mod: 25 | Performed by: FAMILY MEDICINE

## 2021-09-13 PROCEDURE — 85025 COMPLETE CBC W/AUTO DIFF WBC: CPT | Performed by: FAMILY MEDICINE

## 2021-09-13 PROCEDURE — 93050 ART PRESSURE WAVEFORM ANALYS: CPT | Performed by: FAMILY MEDICINE

## 2021-09-13 PROCEDURE — 99397 PER PM REEVAL EST PAT 65+ YR: CPT | Performed by: FAMILY MEDICINE

## 2021-09-13 PROCEDURE — 36415 COLL VENOUS BLD VENIPUNCTURE: CPT | Performed by: FAMILY MEDICINE

## 2021-09-13 RX ORDER — LISINOPRIL 10 MG/1
10 TABLET ORAL EVERY MORNING
Qty: 90 TABLET | Refills: 3 | Status: SHIPPED | OUTPATIENT
Start: 2021-09-13 | End: 2022-07-20

## 2021-09-13 RX ORDER — AMLODIPINE BESYLATE 5 MG/1
5 TABLET ORAL DAILY
Qty: 90 TABLET | Refills: 3 | Status: SHIPPED | OUTPATIENT
Start: 2021-09-13 | End: 2022-10-03

## 2021-09-13 RX ORDER — AMOXICILLIN 500 MG/1
CAPSULE ORAL
COMMUNITY
Start: 2021-02-26 | End: 2022-10-03

## 2021-09-13 RX ORDER — ROSUVASTATIN CALCIUM 20 MG/1
20 TABLET, COATED ORAL DAILY
Qty: 90 TABLET | Refills: 3 | Status: SHIPPED | OUTPATIENT
Start: 2021-09-13 | End: 2022-10-03

## 2021-09-13 RX ORDER — HYDROCHLOROTHIAZIDE 12.5 MG/1
12.5 CAPSULE ORAL DAILY
Qty: 90 CAPSULE | Refills: 3 | Status: SHIPPED | OUTPATIENT
Start: 2021-09-13 | End: 2022-10-03

## 2021-09-13 ASSESSMENT — MIFFLIN-ST. JEOR: SCORE: 1775.06

## 2021-09-13 NOTE — PATIENT INSTRUCTIONS
"Thank you for coming in today!   If you receive a survey via My Chart, mail or phone, please let us know if there was anything you especially appreciated today or if there is any way we can improve our clinic. We value your input.     Likewise, we are working hard to attend to our digital reputation.    Please consider reviewing our clinic on Google and/or Facebook via the following links:    [email or web address]                 [email or web address]    We truly appreciate you taking the time to do this!    General Information:    Today you had your appointment with Willie Nam MD    I am in the clinic:  Monday and Friday mornings  Tuesday and Thursday afternoons    I am not in the office Wednesdays, non urgent calls received on Wednesday will be addressed when I am back in the office on Thursday.    If lab work was done today as part of your evaluation you will generally be contacted via My Chart, mail, or phone with the results within 1-5 days. If there is an alarming result we will contact you by phone. Lab results come back at varying times, I generally wait until all labs are resulted before making comments on results. Please note labs are automatically released to My Chart once available.    If you need refills please contact your pharmacist. They will send a refill request to me to review. Please allow 3 business days for us to process all refill requests.    Please call or send a medical message with any questions or concerns.    Thank you for choosing [facility].  We truly appreciate you trusting us with your medical care.    Your next visit with us should be scheduled for Follow up in 1 year.     Listed next are the medical health Maintenance items we are tracking for your care and when they are next due (or overdue!)  Our goal is 100% \"up to date.\" Keep this list handy to call and schedule what you are due for in the future!    Health " Maintenance Due   Topic Date Due     LIPID  06/19/2021     INFLUENZA VACCINE (1) 09/01/2021     COLORECTAL CANCER SCREENING  07/18/2021     FALL RISK ASSESSMENT  10/08/2021       Sincerely,    Willie Nam MD

## 2021-09-14 ENCOUNTER — TELEPHONE (OUTPATIENT)
Dept: CARDIOLOGY | Facility: CLINIC | Age: 71
End: 2021-09-14

## 2021-09-14 ENCOUNTER — OFFICE VISIT (OUTPATIENT)
Dept: CARDIOLOGY | Facility: CLINIC | Age: 71
End: 2021-09-14
Attending: INTERNAL MEDICINE
Payer: COMMERCIAL

## 2021-09-14 VITALS
BODY MASS INDEX: 28.1 KG/M2 | HEART RATE: 57 BPM | HEIGHT: 73 IN | DIASTOLIC BLOOD PRESSURE: 68 MMHG | WEIGHT: 212 LBS | SYSTOLIC BLOOD PRESSURE: 136 MMHG

## 2021-09-14 DIAGNOSIS — I25.10 CORONARY ARTERY DISEASE INVOLVING NATIVE CORONARY ARTERY OF NATIVE HEART WITHOUT ANGINA PECTORIS: ICD-10-CM

## 2021-09-14 DIAGNOSIS — I10 BENIGN ESSENTIAL HYPERTENSION: ICD-10-CM

## 2021-09-14 DIAGNOSIS — I10 ESSENTIAL HYPERTENSION: ICD-10-CM

## 2021-09-14 DIAGNOSIS — E78.00 HYPERCHOLESTEREMIA: ICD-10-CM

## 2021-09-14 LAB
ALBUMIN SERPL-MCNC: 4.5 G/DL (ref 3.8–4.8)
ALBUMIN/GLOB SERPL: 2.3 {RATIO} (ref 1.2–2.2)
ALP SERPL-CCNC: 78 IU/L (ref 44–121)
ALT SERPL-CCNC: 27 IU/L (ref 0–44)
AST SERPL-CCNC: 24 IU/L (ref 0–40)
BILIRUB SERPL-MCNC: 0.4 MG/DL (ref 0–1.2)
BUN SERPL-MCNC: 23 MG/DL (ref 8–27)
BUN/CREATININE RATIO: 22 (ref 10–24)
CALCIUM SERPL-MCNC: 9.3 MG/DL (ref 8.6–10.2)
CHLORIDE SERPLBLD-SCNC: 105 MMOL/L (ref 96–106)
CHOLEST SERPL-MCNC: 161 MG/DL (ref 100–199)
CREAT SERPL-MCNC: 1.05 MG/DL (ref 0.76–1.27)
EGFR IF AFRICN AM: 83 ML/MIN/1.73
EGFR IF NONAFRICN AM: 72 ML/MIN/1.73
GLOBULIN, TOTAL: 2 G/DL (ref 1.5–4.5)
GLUCOSE SERPL-MCNC: 107 MG/DL (ref 65–99)
HDLC SERPL-MCNC: 56 MG/DL
LDL/HDL RATIO: 1.6 RATIO (ref 0–3.6)
LDLC SERPL CALC-MCNC: 88 MG/DL (ref 0–99)
POTASSIUM SERPL-SCNC: 4.5 MMOL/L (ref 3.5–5.2)
PROT SERPL-MCNC: 6.5 G/DL (ref 6–8.5)
PSA NG/ML: 1.3 NG/ML (ref 0–4)
SODIUM SERPL-SCNC: 144 MMOL/L (ref 134–144)
TOTAL CO2: 25 MMOL/L (ref 20–29)
TRIGL SERPL-MCNC: 92 MG/DL (ref 0–149)
VLDLC SERPL CALC-MCNC: 17 MG/DL (ref 5–40)

## 2021-09-14 PROCEDURE — 99214 OFFICE O/P EST MOD 30 MIN: CPT | Performed by: INTERNAL MEDICINE

## 2021-09-14 ASSESSMENT — MIFFLIN-ST. JEOR: SCORE: 1780.51

## 2021-09-14 NOTE — LETTER
9/14/2021    Willie Nam MD  [address]  [facility] [address]    RE: Peter Hernandez II       Dear Colleague,    I had the pleasure of seeing Peter Hernandez II in the [facility].    HPI and Plan:   See dictation    Orders Placed This Encounter   Procedures     Basic metabolic panel     Lipid Profile     ALT     Follow-Up with Cardiologist       No orders of the defined types were placed in this encounter.      Medications Discontinued During This Encounter   Medication Reason     aspirin (ASA) 325 MG EC tablet Dose adjustment         Encounter Diagnoses   Name Primary?     Essential hypertension      Coronary artery disease involving native coronary artery of native heart without angina pectoris      Hypercholesteremia      Benign essential hypertension        CURRENT MEDICATIONS:  Current Outpatient Medications   Medication Sig Dispense Refill     acetaminophen (TYLENOL) 325 MG tablet Take 2 tablets (650 mg) by mouth every 4 hours as needed for other (mild pain) 100 tablet 0     albuterol (VENTOLIN HFA) 108 (90 Base) MCG/ACT inhaler Inhale 2 puffs into the lungs every 6 hours as needed for shortness of breath / dyspnea or wheezing 3 Inhaler 3     amLODIPine (NORVASC) 5 MG tablet Take 1 tablet (5 mg) by mouth daily 90 tablet 3     amoxicillin (AMOXIL) 500 MG capsule TAKE 4 CAPSULES BY MOUTH 1 HOUR PRIOR TO DENTAL APPOINTMENT       Ascorbic Acid (VITAMIN C) 500 MG CAPS Take 500 mg by mouth 2 times daily (with meals)        aspirin (ASA) 81 MG EC tablet Take 81 mg by mouth daily       hydrochlorothiazide (MICROZIDE) 12.5 MG capsule Take 1 capsule (12.5 mg) by mouth daily 90 capsule 3     hydrOXYzine (ATARAX) 10 MG tablet Take 1 tablet (10 mg) by mouth every 6 hours as needed for itching or anxiety (with pain, moderate pain) 30 tablet 0     ketoconazole (NIZORAL) 2 % external cream APPLY TOPICALLY DAILY (Patient taking differently: Apply  topically daily as needed (to groin area) APPLY TOPICALLY DAILY) 60 g 0     lisinopril (ZESTRIL) 10 MG tablet Take 1 tablet (10 mg) by mouth every morning TAKE 1 TABLET BY MOUTH ONCE DAILY. 90 tablet 3     Multiple Vitamin (DAILY MULTIVITAMIN PO) Take 2 capsules by mouth every morning        Naphazoline-Pheniramine (OPCON-A OP) Place 1 drop into both eyes daily as needed (cat allergies)       naproxen (NAPROSYN) 500 MG tablet TAKE 1 TABLET BY MOUTH TWICE A DAY WITH MEALS 40 tablet 3     nitroGLYcerin (NITROSTAT) 0.4 MG sublingual tablet For chest pain place 1 tablet under the tongue every 5 minutes for 3 doses. If symptoms persist 5 minutes after 1st dose call 911. 25 tablet 3     Probiotic Product (PROBIOTIC-10 PO) Take 1 capsule by mouth every morning        Psyllium (METAMUCIL FIBER PO) Take 1-2 teaspoonful by mouth daily as needed (mix with a glass of water and drink)       QVAR REDIHALER 80 MCG/ACT inhaler Inhale 1 puff into the lungs 2 times daily Only in winter or Around cats See Admin Instructions Only in winter or Around cats (Patient taking differently: Inhale 1 puff into the lungs daily as needed Only in winter or Around cats See Admin Instructions Only in winter or Around cats) 1 Inhaler 11     rosuvastatin (CRESTOR) 20 MG tablet Take 1 tablet (20 mg) by mouth daily 90 tablet 3     senna-docusate (SENOKOT-S/PERICOLACE) 8.6-50 MG tablet Take 1-2 tablets by mouth 2 times daily Take while on oral narcotics to prevent or treat constipation. 30 tablet 0     Vitamin D, Cholecalciferol, 1000 units CAPS Take 3,000 Units by mouth every morning        zinc gluconate 50 MG tablet Take 50 mg by mouth every morning          ALLERGIES     Allergies   Allergen Reactions     Cats      Simvastatin      achey       PAST MEDICAL HISTORY:  Past Medical History:   Diagnosis Date     ACP (advance care planning)     will bring copy in      Arthritis      Bruit      CAD (coronary artery disease) 2002    a stent     CKD (chronic  kidney disease)      Esophageal reflux 5/17/2013     Family history of ischemic heart disease      Hyperlipidaemia      Hypertension        PAST SURGICAL HISTORY:  Past Surgical History:   Procedure Laterality Date     ARTHROPLASTY HIP Right 2/23/2021    Procedure: RIGHT TOTAL HIP ARTHROPLASTY;  Surgeon: Gera Davison MD;  Location: [facility] OR     CYSTECTOMY PILONIDAL       HEART CATH, ANGIOPLASTY  10/4/02    3.0 X 8 mm Velocity stent     ROTATOR CUFF REPAIR RT/LT  2009    Dr. Butler       FAMILY HISTORY:  Family History   Problem Relation Age of Onset     C.A.D. Father      Cancer Father         bone     Cerebrovascular Disease Mother      Coronary Artery Disease Brother      Heart Disease Brother        SOCIAL HISTORY:  Social History     Socioeconomic History     Marital status:      Spouse name: None     Number of children: None     Years of education: None     Highest education level: None   Occupational History     Occupation: Finance   Tobacco Use     Smoking status: Never Smoker     Smokeless tobacco: Never Used   Substance and Sexual Activity     Alcohol use: Yes     Alcohol/week: 0.8 standard drinks     Types: 1 Standard drinks or equivalent per week     Comment: occasionally     Drug use: No     Sexual activity: Yes     Partners: Female   Other Topics Concern     Parent/sibling w/ CABG, MI or angioplasty before 65F 55M? No      Service Not Asked     Blood Transfusions Not Asked     Caffeine Concern Not Asked     Occupational Exposure Not Asked     Hobby Hazards Not Asked     Sleep Concern Yes     Comment: occ     Stress Concern No     Weight Concern Not Asked     Special Diet Not Asked     Back Care Not Asked     Exercise Yes     Comment: walks daily     Bike Helmet Not Asked     Seat Belt Not Asked     Self-Exams Not Asked   Social History Narrative     None     Social Determinants of Health     Financial Resource Strain:      Difficulty of Paying Living Expenses:    Food Insecurity:   "    Worried About Running Out of Food in the Last Year:      Ran Out of Food in the Last Year:    Transportation Needs:      Lack of Transportation (Medical):      Lack of Transportation (Non-Medical):    Physical Activity:      Days of Exercise per Week:      Minutes of Exercise per Session:    Stress:      Feeling of Stress :    Social Connections:      Frequency of Communication with Friends and Family:      Frequency of Social Gatherings with Friends and Family:      Attends Taoism Services:      Active Member of Clubs or Organizations:      Attends Club or Organization Meetings:      Marital Status:    Intimate Partner Violence:      Fear of Current or Ex-Partner:      Emotionally Abused:      Physically Abused:      Sexually Abused:        Review of Systems:  Skin:  Negative       Eyes:  Positive for glasses    ENT:  Negative      Respiratory:  Negative       Cardiovascular:  Negative      Gastroenterology: Negative      Genitourinary:  not assessed      Musculoskeletal:  Positive for joint stiffness    Neurologic:  Negative      Psychiatric:  Negative      Heme/Lymph/Imm:  Negative      Endocrine:  Negative        Physical Exam:  Vitals: /68   Pulse 57   Ht 1.854 m (6' 1\")   Wt 96.2 kg (212 lb)   BMI 27.97 kg/m      Constitutional:  cooperative, alert and oriented, well developed, well nourished, in no acute distress overweight      Skin:  warm and dry to the touch, no apparent skin lesions or masses noted          Head:  normocephalic, no masses or lesions        Eyes:  pupils equal and round, conjunctivae and lids unremarkable, sclera white, no xanthalasma, EOMS intact, no nystagmus        Lymph:No Cervical lymphadenopathy present     ENT:  no pallor or cyanosis, dentition good        Neck:  carotid pulses are full and equal bilaterally, JVP normal, no carotid bruit        Respiratory:  normal breath sounds, clear to auscultation, normal A-P diameter, normal symmetry, normal respiratory " excursion, no use of accessory muscles         Cardiac: regular rhythm;normal S1 and S2;apical impulse not displaced;no murmurs, gallops or rubs detected   S4            pulses full and equal, no bruits auscultated                                        GI:  not assessed this visit        Extremities and Muscular Skeletal:  no deformities, clubbing, cyanosis, erythema observed;no edema              Neurological:  no gross motor deficits;affect appropriate        Psych:  Alert and Oriented x 3        CC  Braulio Blanca MD  [address]  [place] [id number]                  Thank you for allowing me to participate in the care of your patient.      Sincerely,     Braulio Blanca MD, MD     [facility]  cc:   Braulio Blanca MD  [address]  [place] [id number]

## 2021-09-14 NOTE — PROGRESS NOTES
HPI and Plan:   See dictation    Orders Placed This Encounter   Procedures     Basic metabolic panel     Lipid Profile     ALT     Follow-Up with Cardiologist       No orders of the defined types were placed in this encounter.      Medications Discontinued During This Encounter   Medication Reason     aspirin (ASA) 325 MG EC tablet Dose adjustment         Encounter Diagnoses   Name Primary?     Essential hypertension      Coronary artery disease involving native coronary artery of native heart without angina pectoris      Hypercholesteremia      Benign essential hypertension        CURRENT MEDICATIONS:  Current Outpatient Medications   Medication Sig Dispense Refill     acetaminophen (TYLENOL) 325 MG tablet Take 2 tablets (650 mg) by mouth every 4 hours as needed for other (mild pain) 100 tablet 0     albuterol (VENTOLIN HFA) 108 (90 Base) MCG/ACT inhaler Inhale 2 puffs into the lungs every 6 hours as needed for shortness of breath / dyspnea or wheezing 3 Inhaler 3     amLODIPine (NORVASC) 5 MG tablet Take 1 tablet (5 mg) by mouth daily 90 tablet 3     amoxicillin (AMOXIL) 500 MG capsule TAKE 4 CAPSULES BY MOUTH 1 HOUR PRIOR TO DENTAL APPOINTMENT       Ascorbic Acid (VITAMIN C) 500 MG CAPS Take 500 mg by mouth 2 times daily (with meals)        aspirin (ASA) 81 MG EC tablet Take 81 mg by mouth daily       hydrochlorothiazide (MICROZIDE) 12.5 MG capsule Take 1 capsule (12.5 mg) by mouth daily 90 capsule 3     hydrOXYzine (ATARAX) 10 MG tablet Take 1 tablet (10 mg) by mouth every 6 hours as needed for itching or anxiety (with pain, moderate pain) 30 tablet 0     ketoconazole (NIZORAL) 2 % external cream APPLY TOPICALLY DAILY (Patient taking differently: Apply topically daily as needed (to groin area) APPLY TOPICALLY DAILY) 60 g 0     lisinopril (ZESTRIL) 10 MG tablet Take 1 tablet (10 mg) by mouth every morning TAKE 1 TABLET BY MOUTH ONCE DAILY. 90 tablet 3     Multiple Vitamin (DAILY MULTIVITAMIN PO) Take 2 capsules  by mouth every morning        Naphazoline-Pheniramine (OPCON-A OP) Place 1 drop into both eyes daily as needed (cat allergies)       naproxen (NAPROSYN) 500 MG tablet TAKE 1 TABLET BY MOUTH TWICE A DAY WITH MEALS 40 tablet 3     nitroGLYcerin (NITROSTAT) 0.4 MG sublingual tablet For chest pain place 1 tablet under the tongue every 5 minutes for 3 doses. If symptoms persist 5 minutes after 1st dose call 911. 25 tablet 3     Probiotic Product (PROBIOTIC-10 PO) Take 1 capsule by mouth every morning        Psyllium (METAMUCIL FIBER PO) Take 1-2 teaspoonful by mouth daily as needed (mix with a glass of water and drink)       QVAR REDIHALER 80 MCG/ACT inhaler Inhale 1 puff into the lungs 2 times daily Only in winter or Around cats See Admin Instructions Only in winter or Around cats (Patient taking differently: Inhale 1 puff into the lungs daily as needed Only in winter or Around cats See Admin Instructions Only in winter or Around cats) 1 Inhaler 11     rosuvastatin (CRESTOR) 20 MG tablet Take 1 tablet (20 mg) by mouth daily 90 tablet 3     senna-docusate (SENOKOT-S/PERICOLACE) 8.6-50 MG tablet Take 1-2 tablets by mouth 2 times daily Take while on oral narcotics to prevent or treat constipation. 30 tablet 0     Vitamin D, Cholecalciferol, 1000 units CAPS Take 3,000 Units by mouth every morning        zinc gluconate 50 MG tablet Take 50 mg by mouth every morning          ALLERGIES     Allergies   Allergen Reactions     Cats      Simvastatin      achey       PAST MEDICAL HISTORY:  Past Medical History:   Diagnosis Date     ACP (advance care planning)     will bring copy in      Arthritis      Bruit      CAD (coronary artery disease) 2002    a stent     CKD (chronic kidney disease)      Esophageal reflux 5/17/2013     Family history of ischemic heart disease      Hyperlipidaemia      Hypertension        PAST SURGICAL HISTORY:  Past Surgical History:   Procedure Laterality Date     ARTHROPLASTY HIP Right 2/23/2021     Procedure: RIGHT TOTAL HIP ARTHROPLASTY;  Surgeon: Gera Davison MD;  Location: [facility] OR     CYSTECTOMY PILONIDAL       HEART CATH, ANGIOPLASTY  10/4/02    3.0 X 8 mm Velocity stent     ROTATOR CUFF REPAIR RT/LT  2009    Dr. Butler       FAMILY HISTORY:  Family History   Problem Relation Age of Onset     C.A.D. Father      Cancer Father         bone     Cerebrovascular Disease Mother      Coronary Artery Disease Brother      Heart Disease Brother        SOCIAL HISTORY:  Social History     Socioeconomic History     Marital status:      Spouse name: None     Number of children: None     Years of education: None     Highest education level: None   Occupational History     Occupation: Finance   Tobacco Use     Smoking status: Never Smoker     Smokeless tobacco: Never Used   Substance and Sexual Activity     Alcohol use: Yes     Alcohol/week: 0.8 standard drinks     Types: 1 Standard drinks or equivalent per week     Comment: occasionally     Drug use: No     Sexual activity: Yes     Partners: Female   Other Topics Concern     Parent/sibling w/ CABG, MI or angioplasty before 65F 55M? No      Service Not Asked     Blood Transfusions Not Asked     Caffeine Concern Not Asked     Occupational Exposure Not Asked     Hobby Hazards Not Asked     Sleep Concern Yes     Comment: occ     Stress Concern No     Weight Concern Not Asked     Special Diet Not Asked     Back Care Not Asked     Exercise Yes     Comment: walks daily     Bike Helmet Not Asked     Seat Belt Not Asked     Self-Exams Not Asked   Social History Narrative     None     Social Determinants of Health     Financial Resource Strain:      Difficulty of Paying Living Expenses:    Food Insecurity:      Worried About Running Out of Food in the Last Year:      Ran Out of Food in the Last Year:    Transportation Needs:      Lack of Transportation (Medical):      Lack of Transportation (Non-Medical):    Physical Activity:      Days of Exercise per Week:   "    Minutes of Exercise per Session:    Stress:      Feeling of Stress :    Social Connections:      Frequency of Communication with Friends and Family:      Frequency of Social Gatherings with Friends and Family:      Attends Sabianism Services:      Active Member of Clubs or Organizations:      Attends Club or Organization Meetings:      Marital Status:    Intimate Partner Violence:      Fear of Current or Ex-Partner:      Emotionally Abused:      Physically Abused:      Sexually Abused:        Review of Systems:  Skin:  Negative       Eyes:  Positive for glasses    ENT:  Negative      Respiratory:  Negative       Cardiovascular:  Negative      Gastroenterology: Negative      Genitourinary:  not assessed      Musculoskeletal:  Positive for joint stiffness    Neurologic:  Negative      Psychiatric:  Negative      Heme/Lymph/Imm:  Negative      Endocrine:  Negative        Physical Exam:  Vitals: /68   Pulse 57   Ht 1.854 m (6' 1\")   Wt 96.2 kg (212 lb)   BMI 27.97 kg/m      Constitutional:  cooperative, alert and oriented, well developed, well nourished, in no acute distress overweight      Skin:  warm and dry to the touch, no apparent skin lesions or masses noted          Head:  normocephalic, no masses or lesions        Eyes:  pupils equal and round, conjunctivae and lids unremarkable, sclera white, no xanthalasma, EOMS intact, no nystagmus        Lymph:No Cervical lymphadenopathy present     ENT:  no pallor or cyanosis, dentition good        Neck:  carotid pulses are full and equal bilaterally, JVP normal, no carotid bruit        Respiratory:  normal breath sounds, clear to auscultation, normal A-P diameter, normal symmetry, normal respiratory excursion, no use of accessory muscles         Cardiac: regular rhythm;normal S1 and S2;apical impulse not displaced;no murmurs, gallops or rubs detected   S4            pulses full and equal, no bruits auscultated                                        GI:  not " assessed this visit        Extremities and Muscular Skeletal:  no deformities, clubbing, cyanosis, erythema observed;no edema              Neurological:  no gross motor deficits;affect appropriate        Psych:  Alert and Oriented x 3        CC  Braulio Blanca MD  [address]  [place] [id number]

## 2021-09-14 NOTE — PROGRESS NOTES
Service Date: 2021    REFERRING PHYSICIAN:  Willie Nam MD.      HISTORY OF PRESENT ILLNESS:  It is my pleasure to see your patient, Peter Contreras, in followup.  He is an extremely pleasant 71-year-old patient with a known history of coronary artery disease with prior stenting of the proximal right coronary artery with mild coronary artery disease elsewhere.  He also has a history of benign essential hypertension and hypercholesterolemia.  Since I last saw him, he has been doing well.  He has had multiple orthopedic surgeries with a shoulder and hip surgery.  He has no chest pains, no chest pressure and no unusual shortness of breath.  He is trying to lose weight by exercising now that he has had his orthopedic surgery performed.  His lipid profile in  was excellent with an LDL of 64, HDL of 55 and triglycerides of 80 with a total cholesterol of 135.  His basic metabolic profile showed a BUN of 18, a creatinine of 1.21 and a GFR of 61 and his sodium and potassium were normal being 138 and 4.4 respectively.    IMPRESSION:       1. Asymptomatic with respect to coronary artery disease, status post stenting of the right coronary artery in the past.     2. Essential hypertension.  His blood pressure is well controlled.     3. Excellent lipid profile, well within secondary prevention guidelines and with no evidence of hepatic toxicity.     4. Mild thrombocytopenia, based upon a platelet count of 130,000, which was performed yesterday.    PLAN:  We will continue the patient on his good present medications.  We will see the patient back again in 1 year's time.  We will recheck a basic metabolic profile and lipid profile.  As always, however, the patient has been told to contact me if he has any questions or any concerns.    Braulio Aparicio MD, [facility]        D: 2021   T: 2021   MT: tb    Name:     PETER CONTRERAS  MRN:      [id number]        Account:      [id number]   :      1950            Service Date: 09/14/2021       Document: [id number]

## 2021-10-14 ENCOUNTER — TELEPHONE (OUTPATIENT)
Dept: FAMILY MEDICINE | Facility: CLINIC | Age: 71
End: 2021-10-14

## 2021-10-14 NOTE — TELEPHONE ENCOUNTER
Patient called back - colonoscopy scheduled for 11/3/21    Left message to call back [facility]  Discuss colon screen options-11/3/2021 on scheduled- [place] and ACT questions- done  October 14, 2021  Linda Collins MA

## 2021-10-15 ASSESSMENT — ASTHMA QUESTIONNAIRES: ACT_TOTALSCORE: 19

## 2021-10-25 DIAGNOSIS — M79.672 LEFT FOOT PAIN: ICD-10-CM

## 2021-10-26 RX ORDER — NAPROXEN 500 MG/1
TABLET ORAL
Qty: 180 TABLET | Refills: 3 | Status: SHIPPED | OUTPATIENT
Start: 2021-10-26 | End: 2022-11-01

## 2021-11-01 ENCOUNTER — TRANSFERRED RECORDS (OUTPATIENT)
Dept: FAMILY MEDICINE | Facility: CLINIC | Age: 71
End: 2021-11-01

## 2022-02-04 ENCOUNTER — OFFICE VISIT (OUTPATIENT)
Dept: FAMILY MEDICINE | Facility: CLINIC | Age: 72
End: 2022-02-04

## 2022-02-04 VITALS
WEIGHT: 224.2 LBS | SYSTOLIC BLOOD PRESSURE: 138 MMHG | HEART RATE: 58 BPM | OXYGEN SATURATION: 99 % | DIASTOLIC BLOOD PRESSURE: 76 MMHG | BODY MASS INDEX: 29.58 KG/M2 | TEMPERATURE: 97.8 F

## 2022-02-04 DIAGNOSIS — D69.6 THROMBOCYTOPENIA, UNSPECIFIED (H): Primary | ICD-10-CM

## 2022-02-04 DIAGNOSIS — N18.31 STAGE 3A CHRONIC KIDNEY DISEASE (H): ICD-10-CM

## 2022-02-04 DIAGNOSIS — B37.2 YEAST INFECTION OF THE SKIN: ICD-10-CM

## 2022-02-04 DIAGNOSIS — R10.32 ABDOMINAL PAIN, LEFT LOWER QUADRANT: ICD-10-CM

## 2022-02-04 LAB
% GRANULOCYTES: 67.3 % (ref 42.2–75.2)
A/C RATIO MG/G: <30 MG/G
ALBUMIN (URINE) MG/L: 10 MG/L
HCT VFR BLD AUTO: 44.7 % (ref 39–51)
HEMOGLOBIN: 14.5 G/DL (ref 13.4–17.5)
INTERPRETATION: NORMAL
LYMPHOCYTES NFR BLD AUTO: 24.7 % (ref 20.5–51.1)
MCH RBC QN AUTO: 29.6 PG (ref 27–31)
MCHC RBC AUTO-ENTMCNC: 32.4 G/DL (ref 33–37)
MCV RBC AUTO: 91.2 FL (ref 80–100)
MONOCYTES NFR BLD AUTO: 8 % (ref 1.7–9.3)
PLATELET # BLD AUTO: 176 K/UL (ref 140–450)
RBC # BLD AUTO: 4.9 X10/CMM (ref 4.2–5.9)
URINE CREATININE MG/DL - QUEST: 50 MG/DL
WBC # BLD AUTO: 8.6 X10/CMM (ref 3.8–11)

## 2022-02-04 PROCEDURE — 99214 OFFICE O/P EST MOD 30 MIN: CPT | Performed by: FAMILY MEDICINE

## 2022-02-04 PROCEDURE — 82044 UR ALBUMIN SEMIQUANTITATIVE: CPT | Performed by: FAMILY MEDICINE

## 2022-02-04 PROCEDURE — 36415 COLL VENOUS BLD VENIPUNCTURE: CPT | Performed by: FAMILY MEDICINE

## 2022-02-04 PROCEDURE — 85025 COMPLETE CBC W/AUTO DIFF WBC: CPT | Performed by: FAMILY MEDICINE

## 2022-02-04 RX ORDER — KETOCONAZOLE 20 MG/G
CREAM TOPICAL
Qty: 60 G | Refills: 0 | Status: SHIPPED | OUTPATIENT
Start: 2022-02-04 | End: 2022-10-03

## 2022-02-04 NOTE — PROGRESS NOTES
Left abdominal internal discomfort  Movement makes it happen a little  2/10 dull pain  Worse occ with movement lasts 5 seconds.  No problem with exercise.  No food issues....  Problem(s) Oriented visit      ROS:  General and Resp. completed and negative except as noted above     HISTORY:   reports current alcohol use of about 0.8 standard drinks of alcohol per week.   reports that he has never smoked. He has never used smokeless tobacco.    Past Medical History:   Diagnosis Date     ACP (advance care planning)      Arthritis      Bruit      CAD (coronary artery disease) 2002     CKD (chronic kidney disease)      Esophageal reflux 5/17/2013     Family history of ischemic heart disease      Hyperlipidaemia      Hypertension      Past Surgical History:   Procedure Laterality Date     ARTHROPLASTY HIP Right 2/23/2021    Procedure: RIGHT TOTAL HIP ARTHROPLASTY;  Surgeon: Gera Davison MD;  Location: [facility] OR     CYSTECTOMY PILONIDAL       HEART CATH, ANGIOPLASTY  10/4/02    3.0 X 8 mm Velocity stent     ROTATOR CUFF REPAIR RT/LT  2009    Dr. Butler       EXAM:  BP: 138/76   Pulse: 58    Temp: 97.8    Wt Readings from Last 2 Encounters:   02/04/22 101.7 kg (224 lb 3.2 oz)   09/14/21 96.2 kg (212 lb)       BMI= Body mass index is 29.58 kg/m .    EXAM:  APPEARANCE: = Relaxed and in no distress  Conj/Eyelids = noninjected and lids and lashes are without inflammation  PERRLA/Irises = Pupils Round Reactive to Light and Irisis without inflammation  Neck = No anterior or posterior adenopathy appreciated.  Thyroid = Not enlarged and no masses felt  Resp effort = Calm regular breathing  Breath Sounds = Good air movement with no rales or rhonchi in any lung fields  Heart Rate, Rythym, & sounds (no Murm)  = Regular rate and rythym with no S3, S4, or murmer appreciated.  Carotid Art's = Pulses full and equal and no bruits appreciated  Abdomen: soft, nontender, no masses, & bowel sounds = tenderness mild LLQ  Liver/Spleen = Normal  "span and no splenomegaly noted  Digits and Nails = FROM in all finger joints, no nail dystrophy  Ext (edema) = No pretibial edema noted or elsewhere  Musculsktl =  Strength and ROM of major joints are within normal limits  SKIN = absent significant rashes or lesions   Recent/Remote Memory = Alert and Oriented x 3  Mood/Affect = Cooperative and interested      Assessment/Plan:  Peter was seen today for abdominal pain.    Diagnoses and all orders for this visit:    Thrombocytopenia, unspecified (H)  Due for recheck  -     CBC with Diff/Plt ([facility])    Yeast infection of the skin  Due for a refill.  -     ketoconazole (NIZORAL) 2 % external cream; APPLY TOPICALLY DAILY    Stage 3a chronic kidney disease (H)  Chronic kidney disease due to HTN and DM.  Check urine for protein.   Patient is on ACE/ARB.  Patient is on a statin.  Told the patient to avoid NSAIDs if at all possible.   Will monitor closely and send to Nephrologist if renal function continues to decline.      -     Microalbumin ([facility])    Abdominal pain, left lower quadrant  Abdominal pain Ddx:  Non Specific  Rule out infectious    As per routine while the current symptoms do not suggest emergent need for ER evaluation,if the current symptoms increase or fail to resolve as expected we discussed the need for close follow up with us or to utilize the emergency room.    Will start with the following work up:    -     Referral to [facility]; Future  -     CBC with Diff/Plt ([facility])  -     Comp. Metabolic Panel (14) ([facility])        COUNSELING:   reports that he has never smoked. He has never used smokeless tobacco.    Estimated body mass index is 29.58 kg/m  as calculated from the following:    Height as of 9/14/21: 1.854 m (6' 1\").    Weight as of this encounter: 101.7 kg (224 lb 3.2 oz).       Appropriate preventive services were discussed with this patient, including applicable screening as appropriate for cardiovascular disease, diabetes, " osteopenia/osteoporosis, and glaucoma.  As appropriate for age/gender, discussed screening for colorectal cancer, prostate cancer, breast cancer, and cervical cancer. Checklist reviewing preventive services available has been given to the patient.    Reviewed patients plan of care and provided an AVS. The Basic Care Plan (routine screening as documented in Health Maintenance) for Peter meets the Care Plan requirement. This Care Plan has been established and reviewed with the  Patient.      The following health maintenance items are reviewed in Epic and correct as of today:  Health Maintenance   Topic Date Due     MICROALBUMIN  Never done     ASTHMA CONTROL TEST  04/14/2022     ASTHMA ACTION PLAN  06/22/2022     MEDICARE ANNUAL WELLNESS VISIT  09/13/2022     BMP  09/13/2022     LIPID  09/13/2022     FALL RISK ASSESSMENT  09/13/2022     HEMOGLOBIN  09/13/2022     DTAP/TDAP/TD IMMUNIZATION (4 - Td or Tdap) 10/24/2023     ADVANCE CARE PLANNING  09/13/2026     COLORECTAL CANCER SCREENING  11/01/2031     HEPATITIS C SCREENING  Completed     PHQ-2  Completed     INFLUENZA VACCINE  Completed     Pneumococcal Vaccine: 65+ Years  Completed     URINALYSIS  Completed     ZOSTER IMMUNIZATION  Completed     AORTIC ANEURYSM SCREENING (SYSTEM ASSIGNED)  Completed     COVID-19 Vaccine  Completed     IPV IMMUNIZATION  Aged Out     MENINGITIS IMMUNIZATION  Aged Out     HEPATITIS B IMMUNIZATION  Aged Out       Willie Nam  [facility]  For any issues my office # is [phone]

## 2022-02-06 LAB
ALBUMIN SERPL-MCNC: 4.5 G/DL (ref 3.8–4.8)
ALBUMIN/GLOB SERPL: 1.8 {RATIO} (ref 1.2–2.2)
ALP SERPL-CCNC: 76 IU/L (ref 44–121)
ALT SERPL-CCNC: 26 IU/L (ref 0–44)
AST SERPL-CCNC: 23 IU/L (ref 0–40)
BILIRUB SERPL-MCNC: 0.3 MG/DL (ref 0–1.2)
BUN SERPL-MCNC: 21 MG/DL (ref 8–27)
BUN/CREATININE RATIO: 19 (ref 10–24)
CALCIUM SERPL-MCNC: 9.5 MG/DL (ref 8.6–10.2)
CHLORIDE SERPLBLD-SCNC: 102 MMOL/L (ref 96–106)
CREAT SERPL-MCNC: 1.09 MG/DL (ref 0.76–1.27)
EGFR IF AFRICN AM: 80 ML/MIN/1.73
EGFR IF NONAFRICN AM: 69 ML/MIN/1.73
GLOBULIN, TOTAL: 2.5 G/DL (ref 1.5–4.5)
GLUCOSE SERPL-MCNC: 88 MG/DL (ref 65–99)
POTASSIUM SERPL-SCNC: 4.4 MMOL/L (ref 3.5–5.2)
PROT SERPL-MCNC: 7 G/DL (ref 6–8.5)
SODIUM SERPL-SCNC: 141 MMOL/L (ref 134–144)
TOTAL CO2: 23 MMOL/L (ref 20–29)

## 2022-02-08 ENCOUNTER — TRANSFERRED RECORDS (OUTPATIENT)
Dept: FAMILY MEDICINE | Facility: CLINIC | Age: 72
End: 2022-02-08

## 2022-02-08 NOTE — RESULT ENCOUNTER NOTE
Dear Peter,   I am writing to report that your included test results are as expected.  Many lab panels contain some results that are outside of the normal range, I have reviewed each of these results and they require no changes at this time.    Willie Nam MD

## 2022-02-11 ENCOUNTER — OFFICE VISIT (OUTPATIENT)
Dept: FAMILY MEDICINE | Facility: CLINIC | Age: 72
End: 2022-02-11

## 2022-02-11 VITALS
SYSTOLIC BLOOD PRESSURE: 136 MMHG | WEIGHT: 223 LBS | OXYGEN SATURATION: 97 % | HEART RATE: 66 BPM | BODY MASS INDEX: 29.42 KG/M2 | DIASTOLIC BLOOD PRESSURE: 76 MMHG

## 2022-02-11 DIAGNOSIS — R10.32 ABDOMINAL PAIN, LEFT LOWER QUADRANT: Primary | ICD-10-CM

## 2022-02-11 PROCEDURE — 99212 OFFICE O/P EST SF 10 MIN: CPT | Performed by: FAMILY MEDICINE

## 2022-05-16 ENCOUNTER — OFFICE VISIT (OUTPATIENT)
Dept: FAMILY MEDICINE | Facility: CLINIC | Age: 72
End: 2022-05-16

## 2022-05-16 VITALS
RESPIRATION RATE: 18 BRPM | HEIGHT: 73 IN | OXYGEN SATURATION: 96 % | DIASTOLIC BLOOD PRESSURE: 68 MMHG | BODY MASS INDEX: 28.89 KG/M2 | HEART RATE: 50 BPM | SYSTOLIC BLOOD PRESSURE: 142 MMHG | WEIGHT: 218 LBS

## 2022-05-16 DIAGNOSIS — N52.9 ERECTILE DYSFUNCTION, UNSPECIFIED ERECTILE DYSFUNCTION TYPE: ICD-10-CM

## 2022-05-16 DIAGNOSIS — J45.20 MILD INTERMITTENT ASTHMA WITHOUT COMPLICATION: ICD-10-CM

## 2022-05-16 DIAGNOSIS — I10 BENIGN ESSENTIAL HYPERTENSION: Primary | ICD-10-CM

## 2022-05-16 PROCEDURE — 93050 ART PRESSURE WAVEFORM ANALYS: CPT | Performed by: FAMILY MEDICINE

## 2022-05-16 PROCEDURE — 99214 OFFICE O/P EST MOD 30 MIN: CPT | Performed by: FAMILY MEDICINE

## 2022-05-16 RX ORDER — DILTIAZEM HYDROCHLORIDE 60 MG/1
TABLET, FILM COATED ORAL
COMMUNITY
Start: 2022-04-19 | End: 2023-04-13

## 2022-05-16 RX ORDER — SILDENAFIL 50 MG/1
50 TABLET, FILM COATED ORAL DAILY PRN
Qty: 30 TABLET | Refills: 1 | Status: SHIPPED | OUTPATIENT
Start: 2022-05-16 | End: 2023-10-09

## 2022-05-16 NOTE — PROGRESS NOTES
Problem(s) Oriented visit        SUBJECTIVE:                                                    Peter Hernandez II is a 72 year old male who presents to clinic today for the following health issues :    1. Benign essential hypertension  Essential Hypertension   Remains well controlled when checked out of clinic.   he has not experienced any significant side effects from medications for hypertension.    NO active cardiac complaints or symptoms with exercise.  Current medications for treatment:  ACE inhibitors (Lisinopril, Ramipril,Captopril,Benazepril), Diuretic thiazide (chlorthalidone, hydrochlorothiazide, indapamide and Calcium channel blocker (Amlodipine, Diltiazem,Verapamil)    Reviewed last 6 BP readings in chart:  BP Readings from Last 6 Encounters:   05/16/22 (!) 142/68   02/11/22 136/76   02/04/22 138/76   09/14/21 136/68   09/13/21 135/60   06/23/21 117/72         - [place] ART PRESS WAVEFORM ANALYS CENTRAL ART PRESSURE    2. Erectile dysfunction, unspecified erectile dysfunction type  Pt. reports difficulty achieving and maintaining erections.  Denies other specific  complaints.  Has not tried VIAGRA or similar medication.  Is interested in trying medication to assist with erections.    - sildenafil (VIAGRA) 50 MG tablet; Take 1 tablet (50 mg) by mouth daily as needed  Dispense: 30 tablet; Refill: 1    3. Mild intermittent asthma without complication  Asthma stable.  Has not had any recent breathing troubles beyond usual baseline.  Has not any recent acute respiratory events.  Using medication as directed with reported side effects    ACT Total Scores 10/8/2020 5/11/2021 10/14/2021   ACT TOTAL SCORE - - -   ASTHMA ER VISITS - - -   ASTHMA HOSPITALIZATIONS - - -   ACT TOTAL SCORE (Goal Greater than or Equal to 20) 17 22 19   In the past 12 months, how many times did you visit the emergency room for your asthma without being admitted to the hospital? 0 0 0   In the past 12 months, how many times were you  "hospitalized overnight because of your asthma? 0 0 0       - Asthma Action Plan (AAP)       Problem list, Medication list, Allergies, and Medical/Social/Surgical histories reviewed in [facility] and updated as appropriate.   Additional history: as documented    ROS:  General and CV completed and negative except as noted above    Histories:   Patient Active Problem List   Diagnosis     Hypercholesteremia     CAD (coronary artery disease)     Esophageal reflux     Health Care Home     Family history of ischemic heart disease     Asthma     Benign essential hypertension     CKD (chronic kidney disease) stage 3, GFR 30-59 ml/min (H)     Status post total hip replacement, right     Thrombocytopenia, unspecified (H)     Past Surgical History:   Procedure Laterality Date     ARTHROPLASTY HIP Right 2/23/2021    Procedure: RIGHT TOTAL HIP ARTHROPLASTY;  Surgeon: Gera Davison MD;  Location: [facility] OR     CYSTECTOMY PILONIDAL       HEART CATH, ANGIOPLASTY  10/4/02    3.0 X 8 mm Velocity stent     ROTATOR CUFF REPAIR RT/LT  2009    Dr. Butler       Social History     Tobacco Use     Smoking status: Never Smoker     Smokeless tobacco: Never Used   Substance Use Topics     Alcohol use: Yes     Alcohol/week: 0.8 standard drinks     Types: 1 Standard drinks or equivalent per week     Comment: occasionally     Family History   Problem Relation Age of Onset     C.A.D. Father      Cancer Father         bone     Cerebrovascular Disease Mother      Coronary Artery Disease Brother      Heart Disease Brother            OBJECTIVE:                                                    BP (!) 142/68   Pulse 50   Resp 18   Ht 1.854 m (6' 1\")   Wt 98.9 kg (218 lb)   SpO2 96%   BMI 28.76 kg/m    Body mass index is 28.76 kg/m .   APPEARANCE: = Relaxed and in no distress     ASSESSMENT/PLAN:                                                        Peter was seen today for personal problem.    Diagnoses and all orders for this visit:    Benign " essential hypertension  -     [place] ART PRESS WAVEFORM ANALYS CENTRAL ART PRESSURE    Erectile dysfunction, unspecified erectile dysfunction type  Discussed erectile dysfunction in detail including a review of the physiology that produces erections.  Reviewed typical causes of impotence such as medication side effect, diabetes, neuropathies, drugs/alcohol, underlying mood disorder, relationship issues, neurogenic causes, hypogonadism.  Discussed typical medical evaluation including physical exam findings, labs (thyroid, testosterone, BMP, etc.)  Treatment will include modifying any causative features if able, stopping nay substance use, treating low testosterone levels, and consideration of medications (Viagra/Levitra/Cialis).     Discussed the risks and benefits of these meds.  Discussed the potential side effects of Viagra/Levitra/Cialis including dizziness, headache, vision changes, syncope, GI upset/dyspepsia, hypotension.  Recommended avoiding taking with large meal to improve absorption.  Avoid taking with alcohol.  Never take nitroglycerin containing compounds when on these medications.    WIll start with a lower dose and titrate upward as needed, aiming for the lowest effective dose.  If effective, they will call for a prescription provided the medication if effective and there are no side effects.     -     sildenafil (VIAGRA) 50 MG tablet; Take 1 tablet (50 mg) by mouth daily as needed    Mild intermittent asthma without complication        Work on weight loss  Regular exercise  There are no Patient Instructions on file for this visit.    The following health maintenance items are reviewed in Epic and correct as of today:  Health Maintenance   Topic Date Due     COVID-19 Vaccine (4 - Booster for Moderna series) 03/11/2022     ASTHMA CONTROL TEST  04/14/2022     MEDICARE ANNUAL WELLNESS VISIT  09/13/2022     LIPID  09/13/2022     FALL RISK ASSESSMENT  09/13/2022     BMP  02/04/2023     MICROALBUMIN  02/04/2023      HEMOGLOBIN  02/04/2023     ASTHMA ACTION PLAN  05/16/2023     DTAP/TDAP/TD IMMUNIZATION (4 - Td or Tdap) 10/24/2023     ADVANCE CARE PLANNING  09/13/2026     COLORECTAL CANCER SCREENING  11/01/2031     HEPATITIS C SCREENING  Completed     PHQ-2 (once per calendar year)  Completed     INFLUENZA VACCINE  Completed     Pneumococcal Vaccine: 65+ Years  Completed     URINALYSIS  Completed     ZOSTER IMMUNIZATION  Completed     AORTIC ANEURYSM SCREENING (SYSTEM ASSIGNED)  Completed     IPV IMMUNIZATION  Aged Out     MENINGITIS IMMUNIZATION  Aged Out     HEPATITIS B IMMUNIZATION  Aged Out       Willie Nam MD  [facility]  Family Practice  [facility]  [phone]    For any issues my office # is [phone]

## 2022-06-10 ENCOUNTER — TRANSFERRED RECORDS (OUTPATIENT)
Dept: FAMILY MEDICINE | Facility: CLINIC | Age: 72
End: 2022-06-10

## 2022-07-12 ENCOUNTER — OFFICE VISIT (OUTPATIENT)
Dept: FAMILY MEDICINE | Facility: CLINIC | Age: 72
End: 2022-07-12

## 2022-07-12 VITALS
BODY MASS INDEX: 28.89 KG/M2 | RESPIRATION RATE: 16 BRPM | HEART RATE: 51 BPM | DIASTOLIC BLOOD PRESSURE: 70 MMHG | OXYGEN SATURATION: 97 % | SYSTOLIC BLOOD PRESSURE: 133 MMHG | TEMPERATURE: 97.8 F | WEIGHT: 218 LBS | HEIGHT: 73 IN

## 2022-07-12 DIAGNOSIS — N18.31 STAGE 3A CHRONIC KIDNEY DISEASE (H): ICD-10-CM

## 2022-07-12 DIAGNOSIS — B35.1 ONYCHOMYCOSIS: Primary | ICD-10-CM

## 2022-07-12 DIAGNOSIS — I25.118 CORONARY ARTERY DISEASE OF NATIVE ARTERY OF NATIVE HEART WITH STABLE ANGINA PECTORIS (H): ICD-10-CM

## 2022-07-12 DIAGNOSIS — L60.3 DYSTROPHIC NAIL: ICD-10-CM

## 2022-07-12 DIAGNOSIS — I10 BENIGN ESSENTIAL HYPERTENSION: ICD-10-CM

## 2022-07-12 LAB — KOH PREP: ABNORMAL

## 2022-07-12 PROCEDURE — 36415 COLL VENOUS BLD VENIPUNCTURE: CPT | Performed by: FAMILY MEDICINE

## 2022-07-12 PROCEDURE — 99214 OFFICE O/P EST MOD 30 MIN: CPT | Performed by: FAMILY MEDICINE

## 2022-07-12 PROCEDURE — 87220 TISSUE EXAM FOR FUNGI: CPT | Performed by: FAMILY MEDICINE

## 2022-07-12 RX ORDER — TERBINAFINE HYDROCHLORIDE 250 MG/1
250 TABLET ORAL DAILY
Qty: 90 TABLET | Refills: 0 | Status: SHIPPED | OUTPATIENT
Start: 2022-07-12 | End: 2022-10-10

## 2022-07-12 NOTE — PROGRESS NOTES
"stacy  SUBJECTIVE:    Peter Hernandez II, is a 72 year old male presenting for the below:     1. Dystrophic left great toe nail for many months. Wears shoes with wide toe box. Denies any direct trauma to the nail.     2. Hypertension : lisinopril 10 mg, Norvasc 5 mg. Follows with cardiology at [facility].     OBJECTIVE:  Vitals:    07/12/22 0840 07/12/22 0910   BP: (!) 141/79 133/70   Pulse: 51    Resp: 16    Temp: 97.8  F (36.6  C)    TempSrc: Temporal    SpO2: 97%    Weight: 98.9 kg (218 lb)    Height: 1.854 m (6' 1\")     Body mass index is 28.76 kg/m .    General: no acute distress, cooperative with exam.  Extremities: warm, perfused, no swelling or edema. Left great toe thickened with a slight greenish discoloration and slight separation for nail bed.     KOH prep : positive for fungal elements.     ASSESSMENT / PLAN:      Onychomycosis  Dystrophic nail  Discussed oral treatment with terbinafine. Interested in pursuing. Mechanism of action, common side effects, duration on therapy (3 months) and how to take discussed. Discussed recheck LFTs 4 weeks into treatment.  Patient expressed understanding and agreement with the plan.    -     terbinafine (LAMISIL) 250 MG tablet; Take 1 tablet (250 mg) by mouth daily for 90 days  -     Hepatic Function Panel (7) ([facility])  -     KOH ([facility])    Benign essential hypertension  Coronary artery disease involving native coronary artery of native heart with angina pectoris  Stage 3a chronic kidney disease (H)  Adequate blood pressure control. Continue ACEi, CCB, statin. Follows with cardiology. Has nitroglycerine.                   "

## 2022-07-14 LAB
ALBUMIN SERPL-MCNC: 4.4 G/DL (ref 3.8–4.8)
ALP SERPL-CCNC: 69 IU/L (ref 44–121)
ALT SERPL-CCNC: 19 IU/L (ref 0–44)
AST SERPL-CCNC: 17 IU/L (ref 0–40)
BILIRUB SERPL-MCNC: 0.2 MG/DL (ref 0–1.2)
BILIRUBIN, DIRECT: <0.1 MG/DL (ref 0–0.4)
PROT SERPL-MCNC: 6.4 G/DL (ref 6–8.5)

## 2022-07-20 DIAGNOSIS — I10 BENIGN ESSENTIAL HYPERTENSION: ICD-10-CM

## 2022-07-20 RX ORDER — LISINOPRIL 10 MG/1
TABLET ORAL
Qty: 90 TABLET | Refills: 3 | Status: SHIPPED | OUTPATIENT
Start: 2022-07-20 | End: 2023-07-17

## 2022-08-22 DIAGNOSIS — B35.1 ONYCHOMYCOSIS: ICD-10-CM

## 2022-08-22 PROCEDURE — 36415 COLL VENOUS BLD VENIPUNCTURE: CPT | Performed by: FAMILY MEDICINE

## 2022-08-23 ENCOUNTER — OFFICE VISIT (OUTPATIENT)
Dept: FAMILY MEDICINE | Facility: CLINIC | Age: 72
End: 2022-08-23

## 2022-08-23 VITALS
WEIGHT: 220 LBS | OXYGEN SATURATION: 97 % | HEART RATE: 70 BPM | BODY MASS INDEX: 29.03 KG/M2 | DIASTOLIC BLOOD PRESSURE: 72 MMHG | SYSTOLIC BLOOD PRESSURE: 130 MMHG

## 2022-08-23 DIAGNOSIS — B35.1 ONYCHOMYCOSIS OF GREAT TOE: Primary | ICD-10-CM

## 2022-08-23 LAB
ALBUMIN SERPL-MCNC: 4.5 G/DL (ref 3.8–4.8)
ALP SERPL-CCNC: 64 IU/L (ref 44–121)
ALT SERPL-CCNC: 20 IU/L (ref 0–44)
AST SERPL-CCNC: 20 IU/L (ref 0–40)
BILIRUB SERPL-MCNC: 0.3 MG/DL (ref 0–1.2)
BILIRUBIN, DIRECT: 0.11 MG/DL (ref 0–0.4)
PROT SERPL-MCNC: 6.2 G/DL (ref 6–8.5)

## 2022-08-23 PROCEDURE — 99213 OFFICE O/P EST LOW 20 MIN: CPT | Performed by: FAMILY MEDICINE

## 2022-08-23 NOTE — PROGRESS NOTES
SUBJECTIVE:    Peter Hernandez II, is a 72 year old male presenting for the below:     1. Toenail fungus. Right great toe. Nail has spontaneously fallen off since. Has been taking terbinafine for 3 months.     OBJECTIVE:  Vitals:    08/23/22 0851   BP: 130/72   Pulse: 70   SpO2: 97%   Weight: 99.8 kg (220 lb)    Body mass index is 29.03 kg/m .  General: no acute distress, cooperative with exam.    ASSESSMENT / PLAN:      Onychomycosis of great toe  Discussed stopping terbinafine now nail has spontaneously fallen off.  Will recheck LFTs.   -     Hepatic Function Panel (7) ([facility])

## 2022-08-26 NOTE — PROGRESS NOTES
[facility] [address] [place] [address]  Phone: [phone]  Fax: [phone]  Primary Provider: Mirna Nam  Pre-op Performing Provider: MIRNA NAM    PREOPERATIVE EVALUATION:  Today's date: 2/11/2021    Peter Hernandez II is a 70 year old male who presents for a preoperative evaluation.    Surgical Information:  Surgery/Procedure: ARTHROPLASTY, HIP, TOTAL, Right  Surgery Location: [facility]  Surgeon: Gera Davison MD  Surgery Date: 2/23/21  Time of Surgery: 0900  Where patient plans to recover: At home with family  Fax number for surgical facility: Note does not need to be faxed, will be available electronically in Epic.    Type of Anesthesia Anticipated: to be determined    Preoperative Questionnaire:   No - Have you ever had a heart attack or stroke?  YES - HAVE YOU EVER HAD SURGERY ON YOUR HEART OR BLOOD VESSELS, SUCH AS A STENT, CORONARY (HEART) BYPASS, OR SURGERY ON AN ARTERY IN THE HEAD, NECK, HEART, OR LEGS? Stent 2002  No - Do you have chest pain when you are physically active?  No - Do you have a history of heart failure?  No - Do you currently have a cold, bronchitis, or symptoms of other respiratory (head and chest) infections?  No - Do you have a cough, shortness of breath, or wheezing?  No - Do you or anyone in your family have a history of blood clots?  No - Do you or anyone in your family have a serious bleeding problem, such as long-lasting bleeding after surgeries or cuts?  No - Have you ever had anemia or been told to take iron pills?  No - Have you had any abnormal blood loss such as black, tarry or bloody stools, or abnormal vaginal bleeding?  No - Have you ever had a blood transfusion?  Yes - Are you willing to have a blood transfusion if it is medically needed before, during, or after your surgery?  No - Have you or anyone in your family ever had problems with anesthesia (sedation for surgery)?  No - Do you have sleep apnea, excessive  snoring, or daytime drowsiness?   No - Do you have any artifical heart valves or other implanted medical devices, such as a pacemaker, defibrillator, or continuous glucose monitor?  No - Do you have any artifical joints?  No - Are you allergic to latex?  No - Is there any chance that you may be pregnant?      Assessment & Plan     The proposed surgical procedure is considered INTERMEDIATE risk.    Problem List Items Addressed This Visit     Hypercholesteremia    Esophageal reflux    Relevant Medications    Psyllium (METAMUCIL FIBER) 51.7 % PACK    CKD (chronic kidney disease) stage 3, GFR 30-59 ml/min    CAD (coronary artery disease)    Benign essential hypertension    Relevant Orders    ME ART PRESS WAVEFORM ANALYS CENTRAL ART PRESSURE (Completed)    VENOUS COLLECTION (Completed)      Other Visit Diagnoses     Preop general physical exam    -  Primary    Relevant Orders    EKG 12-lead complete w/read - Clinics (Completed)    Basic Metabolic Panel (8) ([facility])    VENOUS COLLECTION (Completed)    Right hip pain        Relevant Orders    EKG 12-lead complete w/read - Clinics (Completed)    Basic Metabolic Panel (8) ([facility])    CBC with Diff/Plt ([facility]) (Completed)    VENOUS COLLECTION (Completed)              Risks and Recommendations:  The patient has the following additional risks and recommendations for perioperative complications:   - No identified additional risk factors other than previously addressed    Medication Instructions:  Patient is to take all scheduled medications on the day of surgery EXCEPT for modifications listed below:   - aspirin: Discontinue aspirin 7-10 days prior to procedure to reduce bleeding risk. It should be resumed postoperatively.    - ACE/ARB: May be continued on the day of surgery.    - Diuretics: HOLD on the day of surgery.   - Statins: Continue taking on the day of surgery.    - naproxen (Aleve, Naprosyn): HOLD 4 days before surgery.     RECOMMENDATION:  APPROVAL GIVEN to proceed  with proposed procedure, without further diagnostic evaluation.                      Subjective     HPI related to upcoming procedure: Right Hip Replacement      Health Care Directive:  Patient has a Health Care Directive on file      Preoperative Review of :   reviewed - no record of controlled substances prescribed.      Status of Chronic Conditions:  See problem list for active medical problems.  Problems all longstanding and stable, except as noted/documented.  See ROS for pertinent symptoms related to these conditions.    ASTHMA - Patient has a longstanding history of moderate-severe Asthma . Patient has been doing well overall noting NO SYMPTOMS and continues on medication regimen consisting of albuterol without adverse reactions or side effects.     CAD - Patient has a longstanding history of moderate-severe CAD. Patient denies recent chest pain or NTG use, denies exercise induced dyspnea or PND. Last Stress test 2015, Is going to follow up with Cardiologist Dr. Blanca for cardiology eval prior to surgery.     HYPERTENSION - Patient has longstanding history of HTN , currently denies any symptoms referable to elevated blood pressure. Specifically denies chest pain, palpitations, dyspnea, orthopnea, PND or peripheral edema. Blood pressure readings have been in normal range. Current medication regimen is as listed below. Patient denies any side effects of medication.       Review of Systems  Constitutional, neuro, ENT, endocrine, pulmonary, cardiac, gastrointestinal, genitourinary, musculoskeletal, integument and psychiatric systems are negative, except as otherwise noted.    Patient Active Problem List    Diagnosis Date Noted     CKD (chronic kidney disease) stage 3, GFR 30-59 ml/min 09/23/2019     Priority: Medium     Benign essential hypertension 12/19/2016     Priority: Medium     Asthma 07/10/2015     Priority: Medium     Allergies to animals, needs inhaler if visiting a client with  pets.  Problem list name updated by automated process. Provider to review       Family history of ischemic heart disease      Priority: Medium     Health Care Home 10/24/2013     Priority: Medium     Esophageal reflux 05/17/2013     Priority: Medium     ACP (advance care planning)      Priority: Medium     will bring copy in        CAD (coronary artery disease) 05/16/2011     Priority: Medium     Last Stress in 7/6/2012,   History of coronary artery disease and stenting of his left anterior descending artery in 2002. His cardiac risk factors include hypertension and mixed lipidemia        Hypercholesteremia 02/27/2011     Priority: Medium      Past Medical History:   Diagnosis Date     ACP (advance care planning)     will bring copy in      Bruit      CAD (coronary artery disease) 2002    a stent     Esophageal reflux 5/17/2013     Family history of ischemic heart disease      Hyperlipidaemia      Hypertension      Past Surgical History:   Procedure Laterality Date     HEART CATH, ANGIOPLASTY  10/4/02    3.0 X 8 mm Velocity stent     ROTATOR CUFF REPAIR RT/LT  2009    Dr. Butler     Current Outpatient Medications   Medication Sig Dispense Refill     albuterol (VENTOLIN HFA) 108 (90 Base) MCG/ACT inhaler Inhale 2 puffs into the lungs every 6 hours as needed for shortness of breath / dyspnea or wheezing 3 Inhaler 3     amLODIPine (NORVASC) 5 MG tablet Take 1 tablet (5 mg) by mouth daily 90 tablet 3     Ascorbic Acid (VITAMIN C) 500 MG CAPS Take 1,000 mg by mouth daily       aspirin 81 MG tablet Take 1 tablet by mouth daily.  3     hydrochlorothiazide (MICROZIDE) 12.5 MG capsule Take 1 capsule (12.5 mg) by mouth daily 90 capsule 3     ketoconazole (NIZORAL) 2 % external cream APPLY TOPICALLY DAILY 60 g 0     lisinopril (ZESTRIL) 10 MG tablet TAKE 1 TABLET BY MOUTH ONCE DAILY. 90 tablet 3     Multiple Vitamin (DAILY MULTIVITAMIN PO) Take  by mouth.         naproxen (NAPROSYN) 500 MG tablet TAKE 1 TABLET BY MOUTH TWICE  "A DAY WITH MEALS 40 tablet 1     nitroGLYcerin (NITROSTAT) 0.4 MG sublingual tablet For chest pain place 1 tablet under the tongue every 5 minutes for 3 doses. If symptoms persist 5 minutes after 1st dose call 911. 25 tablet 3     Psyllium (METAMUCIL FIBER) 51.7 % PACK        QVAR REDIHALER 80 MCG/ACT inhaler Inhale 1 puff into the lungs 2 times daily Only in winter or Around cats See Admin Instructions Only in winter or Around cats 1 Inhaler 11     rosuvastatin (CRESTOR) 20 MG tablet Take 1 tablet (20 mg) by mouth daily 90 tablet 3     tadalafil (CIALIS) 5 MG tablet Take 1 tablet by mouth as needed for erectile dysfunction. Never use with nitroglycerin, terazosin or doxazosin. 30 tablet 1     Vitamin D, Cholecalciferol, 1000 units CAPS Take 3,000 Units by mouth        zinc gluconate 50 MG tablet Take 50 mg by mouth daily       cyclobenzaprine (FLEXERIL) 10 MG tablet Take 1 tablet (10 mg) by mouth 3 times daily as needed for muscle spasms (Patient not taking: Reported on 2/11/2021) 30 tablet 0     Probiotic Product (PROBIOTIC-10 PO) Take by mouth daily       tetrahydrozoline (VISINE) 0.05 % ophthalmic solution 1 drop 3 times daily         Allergies   Allergen Reactions     Cats      Simvastatin      achey        Social History     Tobacco Use     Smoking status: Never Smoker     Smokeless tobacco: Never Used   Substance Use Topics     Alcohol use: Yes     Alcohol/week: 0.8 standard drinks     Types: 1 Standard drinks or equivalent per week     Comment: occasionally       History   Drug Use No         Objective     BP (!) 153/75   Pulse 61   Temp 97.8  F (36.6  C) (Skin)   Resp 16   Ht 1.854 m (6' 1\")   Wt 97.1 kg (214 lb)   BMI 28.23 kg/m      Physical Exam    GENERAL APPEARANCE: healthy, alert and no distress     EYES: EOMI,  PERRL     HENT: ear canals and TM's normal and nose and mouth without ulcers or lesions     NECK: no adenopathy, no asymmetry, masses, or scars and thyroid normal to palpation     RESP: " lungs clear to auscultation - no rales, rhonchi or wheezes     CV: regular rates and rhythm, normal S1 S2, no S3 or S4 and no murmur, click or rub     ABDOMEN:  soft, nontender, no HSM or masses and bowel sounds normal     MS: extremities normal- no gross deformities noted, no evidence of inflammation in joints, FROM in all extremities.     SKIN: no suspicious lesions or rashes     NEURO: Normal strength and tone, sensory exam grossly normal, mentation intact and speech normal     PSYCH: mentation appears normal. and affect normal/bright     LYMPHATICS: No cervical adenopathy    Recent Labs   Lab Test 07/01/20  1640 07/01/20  1601 06/19/20  0845   HGB 14.9  --   --      --   --    NA  --  137 142   POTASSIUM  --  3.8 4.7   CR  --  1.07 1.04        Diagnostics:  Labs pending at this time.  Results will be reviewed when available.   EKG: sinus bradycardia, normal axis, normal intervals, no acute ST/T changes c/w ischemia, no LVH by voltage criteria, unchanged from previous tracings    Revised Cardiac Risk Index (RCRI):  The patient has the following serious cardiovascular risks for perioperative complications:   - No serious cardiac risks = 0 points     RCRI Interpretation: 0 points: Class I (very low risk - 0.4% complication rate)             Signed Electronically by: Willie Nam MD  Copy of this evaluation report is provided to requesting physician.    [facility] Guidelines    Revised Cardiac Risk Index    Statement Selected

## 2022-09-30 NOTE — PATIENT INSTRUCTIONS
Patient Education   Personalized Prevention Plan  You are due for the preventive services outlined below.  Your care team is available to assist you in scheduling these services.  If you have already completed any of these items, please share that information with your care team to update in your medical record.  Health Maintenance Due   Topic Date Due    COVID-19 Vaccine (4 - Booster for Moderna series) 01/06/2022    Cholesterol Lab  09/13/2022

## 2022-09-30 NOTE — PROGRESS NOTES
"  SUBJECTIVE:   Peter Hernandez II is a 72 year old male who presents for Preventive Visit.      Patient has been advised of split billing requirements and indicates understanding: Yes  Are you in the first 12 months of your Medicare Part B coverage?  No    Physical Health:    In general, how would you rate your overall physical health? good    Outside of work, how many days during the week do you exercise? 4-5 days/week    Outside of work, approximately how many minutes a day do you exercise?30-45 minutes  If you drink alcohol do you typically have >3 drinks per day or >7 drinks per week? Yes - AUDIT SCORE:       Do you usually eat at least 4 servings of fruit and vegetables a day, include whole grains & fiber and avoid regularly eating high fat or \"junk\" foods? NO    Do you have any problems taking medications regularly?  No    Do you have any side effects from medications? none    Needs assistance for the following daily activities: no assistance needed    Which of the following safety concerns are present in your home?  none identified     Hearing impairment: No    In the past 6 months, have you been bothered by leaking of urine? no  HEARING FREQUENCY:   Right Ear:     500 Hz :  pass   1000 Hz:  fail   2000 Hz:  pass   4000 Hz:  pass  Left Ear:     500 Hz :  pass   1000 Hz:  fail   2000 Hz:  pass   4000 Hz:  pass  Shawnee Mackenzie CMA 10/3/2022  Mental Health:    In general, how would you rate your overall mental or emotional health? excellent  PHQ-2 Score:      Do you feel safe in your environment? Yes    Have you ever done Advance Care Planning? (For example, a Health Directive, POLST, or a discussion with a medical provider or your loved ones about your wishes): Yes, advance care planning is on file.    Additional concerns to address?      Fall risk:  Fallen 2 or more times in the past year?: No  Any fall with injury in the past year?: No    Cognitive Screenin) Repeat 3 items (Leader, Season, " "Subjective:       Patient ID: Alexandra Goins is a 58 y.o. female.    Vitals:  height is 5' 6" (1.676 m) and weight is 86.4 kg (190 lb 5.9 oz). Her temperature is 97.8 °F (36.6 °C). Her blood pressure is 122/64 and her pulse is 99. Her oxygen saturation is 96%.     Chief Complaint: Trauma    58-year-old female with pertinent past medical history of acute ischemic stroke, cerebral aneurysm, left-sided hemiplegia and hemiparesis following cerebrovascular disease, Diabetes mellitus type 2 presents for consideration of loss of consciousness and injuries associated with fall.  Patient reports that on Saturday morning, she was walking down the steps of her hunting camp in [place], [place] when she reportedly had an unwitnessed episode of loss of consciousness.  She thinks that she lost consciousness for 1 min.  She reports that she fell head 1st onto concrete, injuring her left side.  She reports left facial bruising, left hand, left wrist, left forearm and left knee pain since falling.  She states that she did not seek medical care immediately because she was at the hunting camp and her  did not allow her to do so. She reports persistent nausea since that time. She denies further symptoms or injuries. Patient is right hand dominant.     Patient is accompanied today by her daughter, Twila.    Trauma   The incident occurred 2 days ago (Fell Saturday). Incident location: At camp. The injury mechanism was a fall. No protective equipment was used. There is an injury to the left eye and face (Face). There is an injury to the left hand and left wrist (Left hand and wrist). There is an injury to the left knee (Left knee, both ankles swelling). The pain is severe. It is unknown if a foreign body is present. Associated symptoms include loss of consciousness, nausea and tingling. Pertinent negatives include no abdominal pain, coughing, light-headedness, neck pain, numbness, seizures, visual disturbance, vomiting " Table)    2) Clock draw: NORMAL  3) 3 item recall: Recalls 3 objects  Results: 3 items recalled: COGNITIVE IMPAIRMENT LESS LIKELY    Mini-CogTM Copyright S Dion. Licensed by the author for use in [facility]; reprinted with permission (shannon@[facility]). All rights reserved.      Do you have sleep apnea, excessive snoring or daytime drowsiness?: no    PROBLEMS TO ADD ON...  Essential Hypertension   Remains well controlled when checked out of clinic.   he has not experienced any significant side effects from medications for hypertension.    NO active cardiac complaints or symptoms with exercise.  Current medications for treatment:  ACE inhibitors (Lisinopril, Ramipril,Captopril,Benazepril), Diuretic thiazide (chlorthalidone, hydrochlorothiazide, indapamide and Calcium channel blocker (Amlodipine, Diltiazem,Verapamil)    Reviewed last 6 BP readings in chart:  BP Readings from Last 6 Encounters:   10/03/22 135/75   08/23/22 130/72   07/12/22 133/70   05/16/22 (!) 142/68   02/11/22 136/76   02/04/22 138/76       Asthma stable.  Has not had any recent breathing troubles beyond usual baseline.  Has not any recent acute respiratory events.  Using medication as directed with reported side effects    ACT Total Scores 5/11/2021 10/14/2021 10/3/2022   ACT TOTAL SCORE - - -   ASTHMA ER VISITS - - -   ASTHMA HOSPITALIZATIONS - - -   ACT TOTAL SCORE (Goal Greater than or Equal to 20) 22 19 21   In the past 12 months, how many times did you visit the emergency room for your asthma without being admitted to the hospital? 0 0 0   In the past 12 months, how many times were you hospitalized overnight because of your asthma? 0 0 0     Hyperlipidemia:  Has history of hyperlipidemia.    The patient is taking a medication for this.  Denies any significant side effects from his medication.      Latest labs reviewed:    Recent Labs   Lab Test 09/13/21  0934 06/19/20  0845 12/19/16  1135 07/01/15  0752   CHOL 161 135   < > 132  or weakness. There have been no prior injuries to these areas.       Constitution: Negative for chills, sweating, fatigue and fever.   HENT: Negative for ear pain, ear discharge, facial swelling, facial trauma and sore throat.    Neck: Negative for neck pain, neck stiffness and painful lymph nodes.   Cardiovascular: Negative for chest trauma.   Eyes: Negative for eye trauma, eye itching, eye pain, eye redness, double vision and blurred vision.   Respiratory: Negative for cough and sputum production.    Gastrointestinal: Positive for nausea. Negative for abdominal trauma, abdominal pain, vomiting, constipation, diarrhea and rectal bleeding.   Genitourinary: Negative for hematuria, missed menses, genital trauma and pelvic pain.   Musculoskeletal: Positive for pain, trauma, joint swelling and abnormal ROM of joint.        + left hand pain  + left wrist pain  + left forearm pain  + left knee pain   Skin: Positive for abrasion and bruising. Negative for color change, wound, laceration and erythema.   Allergic/Immunologic: Negative for immunocompromised state.   Neurological: Positive for loss of consciousness. Negative for dizziness, history of vertigo, light-headedness, coordination disturbances, numbness, tingling, seizures and tremors.   Hematologic/Lymphatic: Negative for swollen lymph nodes and history of bleeding disorder.       Objective:      Physical Exam   Constitutional: She is oriented to person, place, and time. Vital signs are normal. She appears well-developed and well-nourished. She is cooperative.  Non-toxic appearance. She does not have a sickly appearance. She does not appear ill. No distress.   HENT:   Head: Normocephalic. Head is with contusion. Head is without raccoon's eyes, without Albert's sign, without abrasion, without laceration, without right periorbital erythema and without left periorbital erythema. Hair is normal.       Right Ear: Tympanic membrane, external ear and ear canal normal.    HDL 56 55   < > 49   LDL 88 64   < > 62*   TRIG 92 80   < > 105   CHOLHDLRATIO  --   --   --  2.7    < > = values in this interval not displayed.        Lab Results   Component Value Date    AST 20 08/22/2022        Reviewed and updated as needed this visit by clinical staff   Tobacco  Allergies  Meds   Med Hx  Surg Hx  Fam Hx  Soc Hx          Reviewed and updated as needed this visit by Provider   Tobacco     Med Hx  Surg Hx  Fam Hx  Soc Hx         Social History     Tobacco Use     Smoking status: Never Smoker     Smokeless tobacco: Never Used   Substance Use Topics     Alcohol use: Yes     Alcohol/week: 0.8 standard drinks     Types: 1 Standard drinks or equivalent per week     Comment: occasionally                           Current providers sharing in care for this patient include:   Patient Care Team:  Willie Nam MD as PCP - General (Family Practice)  Braulio Blanca MD as MD (Cardiology)  Willie Nam MD as Assigned PCP  Braulio Blanca MD as Assigned Heart and Vascular Provider    The following health maintenance items are reviewed in Epic and correct as of today:  Health Maintenance   Topic Date Due     COVID-19 Vaccine (4 - Booster for Moderna series) 01/06/2022     LIPID  09/13/2022     BMP  02/04/2023     MICROALBUMIN  02/04/2023     HEMOGLOBIN  02/04/2023     ASTHMA CONTROL TEST  04/03/2023     MEDICARE ANNUAL WELLNESS VISIT  10/03/2023     ASTHMA ACTION PLAN  10/03/2023     FALL RISK ASSESSMENT  10/03/2023     DTAP/TDAP/TD IMMUNIZATION (4 - Td or Tdap) 10/24/2023     ADVANCE CARE PLANNING  10/03/2027     COLORECTAL CANCER SCREENING  11/01/2031     HEPATITIS C SCREENING  Completed     PHQ-2 (once per calendar year)  Completed     INFLUENZA VACCINE  Completed     Pneumococcal Vaccine: 65+ Years  Completed     URINALYSIS  Completed     ZOSTER IMMUNIZATION  Completed     AORTIC ANEURYSM SCREENING (SYSTEM ASSIGNED)  Completed     IPV IMMUNIZATION  Aged    Left Ear: Tympanic membrane, external ear and ear canal normal.   Nose: Nose normal.   Mouth/Throat: Uvula is midline, oropharynx is clear and moist and mucous membranes are normal.   Eyes: Pupils are equal, round, and reactive to light. Conjunctivae, EOM and lids are normal.   Neck: Trachea normal, normal range of motion, full passive range of motion without pain and phonation normal. Neck supple.   Cardiovascular: Normal rate, regular rhythm, normal heart sounds and intact distal pulses.   Pulses:       Radial pulses are 2+ on the right side, and 2+ on the left side.   Pulmonary/Chest: Effort normal and breath sounds normal. No stridor. No respiratory distress. She has no decreased breath sounds. She has no wheezes. She has no rhonchi. She has no rales.   Abdominal: Soft. Bowel sounds are normal. She exhibits no distension and no mass. There is no tenderness. There is no guarding.   Musculoskeletal:        Right shoulder: Normal.        Left shoulder: Normal.        Right elbow: Normal.       Left elbow: Normal.        Right wrist: Normal.        Left wrist: She exhibits decreased range of motion, tenderness, bony tenderness and swelling. She exhibits no effusion, no crepitus, no deformity and no laceration.        Cervical back: Normal.        Thoracic back: Normal.        Lumbar back: Normal.        Right upper arm: Normal.        Left upper arm: Normal.        Right forearm: Normal.        Left forearm: She exhibits tenderness and swelling. She exhibits no bony tenderness, no edema, no deformity and no laceration.        Right hand: Normal.        Left hand: She exhibits decreased range of motion, tenderness, bony tenderness and swelling. She exhibits normal capillary refill, no deformity and no laceration. Normal sensation noted. Normal strength noted.   There is diffuse swelling of the left hand, left wrist and distal left forearm.  There is diffuse bruising of the left hand, left wrist and left forearm.   "Out     MENINGITIS IMMUNIZATION  Aged Out     HEPATITIS B IMMUNIZATION  Aged Out     Lab work is in process      ROS:  Constitutional, HEENT, cardiovascular, pulmonary, GI, , musculoskeletal, neuro, skin, endocrine and psych systems are negative, except as otherwise noted.    OBJECTIVE:   /75   Pulse 52   Resp 16   Ht 1.854 m (6' 1\")   Wt 100.9 kg (222 lb 6.4 oz)   SpO2 97%   BMI 29.34 kg/m   Estimated body mass index is 29.34 kg/m  as calculated from the following:    Height as of this encounter: 1.854 m (6' 1\").    Weight as of this encounter: 100.9 kg (222 lb 6.4 oz).  EXAM:   GENERAL: healthy, alert and no distress  EYES: Eyes grossly normal to inspection, PERRL and conjunctivae and sclerae normal  HENT: ear canals and TM's normal, nose and mouth without ulcers or lesions  NECK: no adenopathy, no asymmetry, masses, or scars and thyroid normal to palpation  RESP: lungs clear to auscultation - no rales, rhonchi or wheezes  CV: regular rate and rhythm, normal S1 S2, no S3 or S4, no murmur, click or rub, no peripheral edema and peripheral pulses strong  ABDOMEN: soft, nontender, no hepatosplenomegaly, no masses and bowel sounds normal   (male): normal male genitalia without lesions or urethral discharge, no hernia  MS: no gross musculoskeletal defects noted, no edema  SKIN: no suspicious lesions or rashes  NEURO: Normal strength and tone, mentation intact and speech normal  PSYCH: mentation appears normal, affect normal/bright  LYMPH: 1+ shin edema      Diagnostic Test Results:  Labs reviewed in Epic    ASSESSMENT / PLAN:   Peter was seen today for physical and hearing screening.    Diagnoses and all orders for this visit:    Atherosclerosis of the aorta  Seen on Ct 2/2022  Treat with statin and asa    Previous MIKE  -     amoxicillin (AMOXIL) 500 MG capsule; TAKE 4 CAPSULES BY MOUTH 1 HOUR PRIOR TO DENTAL APPOINTMENT    Coronary artery disease involving native coronary artery of native heart without " There is associated tenderness to palpation and decreased range of motion of the left hand, left wrist and left distal forearm secondary to pain.  There is no obvious deformity or wound.  Peripheral sensation is intact.   strength is unacceptable secondary to decreased range of motion secondary to injuries.  Radial pulse intact. Good capillary refill.    Neurological: She is alert and oriented to person, place, and time.   Skin: Skin is warm, dry, not diaphoretic and no rash. Capillary refill takes less than 2 seconds. Lesions:  bruising (left cheek, left hand/wrist/forearm)burn, erythema, ecchymosis, lesion and petechiaeAbrasion: left knee x 3   Psychiatric: She has a normal mood and affect. Her behavior is normal. Judgment and thought content normal.   Nursing note and vitals reviewed.  X-ray Forearm Left    Result Date: 1/27/2020  EXAMINATION: XR FOREARM LEFT CLINICAL HISTORY: fall;Pain in left forearm TECHNIQUE: AP and lateral views of the left forearm were performed. COMPARISON: None FINDINGS: No acute fracture.  Distal forearm/wrist dorsal edema noted.  Correlate with appropriate follow-up.     As above Electronically signed by: Kip Mensah MD Date:    01/27/2020 Time:    11:15    X-ray Wrist Complete Left    Result Date: 1/27/2020  EXAMINATION: XR WRIST COMPLETE 3 VIEWS LEFT; XR HAND COMPLETE 3 VIEW LEFT CLINICAL HISTORY: left wrist injury;; left hand injury; Pain in left wrist; Pain in left hand TECHNIQUE: PA, lateral, and oblique views of the left hand/wrist were performed. COMPARISON: None FINDINGS: Dorsal hand/wrist soft tissue edema present.  No fracture or dislocation appreciated.  Correlate clinically and with appropriate follow-up.     As above Electronically signed by: Kip Mensah MD Date:    01/27/2020 Time:    11:13    Xr Hand Complete 3 View Left    Result Date: 1/27/2020  EXAMINATION: XR WRIST COMPLETE 3 VIEWS LEFT; XR HAND COMPLETE 3 VIEW LEFT CLINICAL HISTORY: left wrist  angina pectoris  Not needing any nitroglycerin, just carries with  -     nitroGLYcerin (NITROSTAT) 0.4 MG sublingual tablet; For chest pain place 1 tablet under the tongue every 5 minutes for 3 doses. If symptoms persist 5 minutes after 1st dose call 911.  -     Comp. Metabolic Panel (14) ([facility])  -     Lipid Profile ([facility])    Hypercholesteremia  Hyperlipidemia  Discussed current lipid results, previous results (if available) current guidelines (NCEP) for treatment and goals for lipids.    Discussed ongoing lifestyle modification, dietary changes (low fat, low simple carb) and regular aerobic exercise.    Discussed the link between dysmetabolic syndrome and impaired glucose tolerance seen in certain patterns of lipids.     Reviewed medication use for lipid lowering, including the statins are their possible side effects of myalgias, rhabdomyolysis, and liver toxicity.  We today managed his prescriptions with refills ensured to ensure availabilty of current medications.  Discussed the importance for aggressive management of dyslipidemia to prevent vascular complications later.     Instructed to contact me if he develop any intolerance to the treatment.    -     rosuvastatin (CRESTOR) 20 MG tablet; Take 1 tablet (20 mg) by mouth daily    Gastroesophageal reflux disease with esophagitis, unspecified whether hemorrhage  OTC prilosec prn  -     CBC with Diff/Plt ([facility])    CKD (chronic kidney disease) stage 2, GFR 60-89 ml/min  On Lisinopril and Statin    Benign essential hypertension  Doing well with control tolerating all meds.  -     hydrochlorothiazide (MICROZIDE) 12.5 MG capsule; Take 1 capsule (12.5 mg) by mouth daily  -     amLODIPine (NORVASC) 5 MG tablet; Take 1 tablet (5 mg) by mouth daily    Mild intermittent asthma without complication  Fall is the worst time continue preventative  Screening for prostate cancer  -     PSA Serum ([facility])    Yeast infection of the skin  -     ketoconazole (NIZORAL) 2 % external  injury;; left hand injury; Pain in left wrist; Pain in left hand TECHNIQUE: PA, lateral, and oblique views of the left hand/wrist were performed. COMPARISON: None FINDINGS: Dorsal hand/wrist soft tissue edema present.  No fracture or dislocation appreciated.  Correlate clinically and with appropriate follow-up.     As above Electronically signed by: iKp Mensah MD Date:    01/27/2020 Time:    11:13    Xr Knee 3 View Left    Result Date: 1/27/2020  EXAMINATION: XR KNEE 3 VIEW LEFT CLINICAL HISTORY: left knee pain; Pain in left knee TECHNIQUE: AP, lateral, and Merchant views of the left knee were performed. COMPARISON: None FINDINGS: No acute osseous abnormality.  No large joint effusion.  Arterial vascular calcifications noted.     As above Electronically signed by: Kip Mensah MD Date:    01/27/2020 Time:    11:15    X-ray Hand 3 View Bilateral    Result Date: 1/21/2020  EXAMINATION: XR HAND COMPLETE 3 VIEWS BILATERAL CLINICAL HISTORY: . Pain in right hand TECHNIQUE: PA, lateral, and oblique views of both hands were performed. COMPARISON: None FINDINGS: No acute fracture or dislocation.  Minimal joint space narrowing seen scattered throughout the interphalangeal joints.  Very minimal degenerative changes noted at the bilateral 1st CMC joints.     As above Electronically signed by: Meir Smith DO Date:    01/21/2020 Time:    09:34            Assessment:       1. Left hand pain    2. Left wrist pain    3. Left forearm pain    4. Acute pain of left knee    5. Abrasion of left knee, initial encounter    6. Loss of consciousness    7. Closed head injury, initial encounter    8. Fall, initial encounter        Plan:         Left hand pain  -     XR HAND COMPLETE 3 VIEW LEFT; Future; Expected date: 01/27/2020  -     Ambulatory referral/consult to Orthopedics    Left wrist pain  -     X-Ray Wrist Complete Left; Future; Expected date: 01/27/2020  -     Ambulatory referral/consult to Orthopedics    Left forearm  "cream; APPLY TOPICALLY DAILY        Patient has been advised of split billing requirements and indicates understanding: Yes    COUNSELING:  Reviewed preventive health counseling, as reflected in patient instructions       Regular exercise       Healthy diet/nutrition    Estimated body mass index is 29.34 kg/m  as calculated from the following:    Height as of this encounter: 1.854 m (6' 1\").    Weight as of this encounter: 100.9 kg (222 lb 6.4 oz).        He reports that he has never smoked. He has never used smokeless tobacco.    Appropriate preventive services were discussed with this patient, including applicable screening as appropriate for cardiovascular disease, diabetes, osteopenia/osteoporosis, and glaucoma.  As appropriate for age/gender, discussed screening for colorectal cancer, prostate cancer, breast cancer, and cervical cancer. Checklist reviewing preventive services available has been given to the patient.    Reviewed patients plan of care and provided an AVS. The Basic Care Plan (routine screening as documented in Health Maintenance) for Peter meets the Care Plan requirement. This Care Plan has been established and reviewed with the Patient.    Counseling Resources:  ATP IV Guidelines  Pooled Cohorts Equation Calculator  Breast Cancer Risk Calculator  BRCA-Related Cancer Risk Assessment: FHS-7 Tool  FRAX Risk Assessment  ICSI Preventive Guidelines  Dietary Guidelines for Americans, 2010  USDA's MyPlate  ASA Prophylaxis  Lung CA Screening    Willie Nam MD  [facility]  " pain  -     X-Ray Forearm Left; Future; Expected date: 01/27/2020    Acute pain of left knee  -     XR KNEE 3 VIEW LEFT; Future; Expected date: 01/27/2020    Abrasion of left knee, initial encounter  -     mupirocin 2 % ointment    Loss of consciousness  -     Refer to Emergency Dept.    Closed head injury, initial encounter  -     Refer to Emergency Dept.    Fall, initial encounter    - Xray of left hand, left wrist, left forearm and left knee are unrevealing for acute fracture or dislocation.  - Discussed RICE therapy with patient and Tylenol for pain control  - Patient's velcro wrist splint from home applied to the left hand/wrist/forearm per patient's request; she declined a splint in office  - Abrasion of the left knee dressed with mupirocin ointment and bandages.  Discussed wound care with patient.  - Will recommend patient seek further evaluation for loss of consciousness and head injury in the ED  - I called and give report to charge nurse, Radha, at [facility] emergency department  - Her daughter Twila agrees to escort her mother to the ED   - I have discussed this case with my supervising physician, Dr. Main, who is in agreement with this plan    I have discussed the diagnosis, treatment plan and recommendations for follow-up at the ED and patient verbalized understanding and is agreeable to the plan. AVS printed and given to patient upon discharge with information regarding this visit. All questions were addressed prior to discharge.    Luma Friend PA-C

## 2022-10-03 ENCOUNTER — OFFICE VISIT (OUTPATIENT)
Dept: FAMILY MEDICINE | Facility: CLINIC | Age: 72
End: 2022-10-03

## 2022-10-03 VITALS
RESPIRATION RATE: 16 BRPM | OXYGEN SATURATION: 97 % | BODY MASS INDEX: 29.48 KG/M2 | HEIGHT: 73 IN | SYSTOLIC BLOOD PRESSURE: 135 MMHG | DIASTOLIC BLOOD PRESSURE: 75 MMHG | HEART RATE: 52 BPM | WEIGHT: 222.4 LBS

## 2022-10-03 DIAGNOSIS — I70.0 ATHEROSCLEROSIS OF AORTA (H): ICD-10-CM

## 2022-10-03 DIAGNOSIS — I10 BENIGN ESSENTIAL HYPERTENSION: ICD-10-CM

## 2022-10-03 DIAGNOSIS — B37.2 YEAST INFECTION OF THE SKIN: ICD-10-CM

## 2022-10-03 DIAGNOSIS — J45.20 MILD INTERMITTENT ASTHMA WITHOUT COMPLICATION: ICD-10-CM

## 2022-10-03 DIAGNOSIS — K21.00 GASTROESOPHAGEAL REFLUX DISEASE WITH ESOPHAGITIS, UNSPECIFIED WHETHER HEMORRHAGE: ICD-10-CM

## 2022-10-03 DIAGNOSIS — I25.10 CORONARY ARTERY DISEASE INVOLVING NATIVE CORONARY ARTERY OF NATIVE HEART WITHOUT ANGINA PECTORIS: ICD-10-CM

## 2022-10-03 DIAGNOSIS — E78.00 HYPERCHOLESTEREMIA: ICD-10-CM

## 2022-10-03 DIAGNOSIS — Z00.00 ENCOUNTER FOR MEDICARE ANNUAL WELLNESS EXAM: Primary | ICD-10-CM

## 2022-10-03 DIAGNOSIS — Z12.5 SCREENING FOR PROSTATE CANCER: ICD-10-CM

## 2022-10-03 DIAGNOSIS — N18.2 CKD (CHRONIC KIDNEY DISEASE) STAGE 2, GFR 60-89 ML/MIN: ICD-10-CM

## 2022-10-03 PROBLEM — D69.6 THROMBOCYTOPENIA, UNSPECIFIED (H): Status: RESOLVED | Noted: 2022-02-04 | Resolved: 2022-10-03

## 2022-10-03 LAB
% GRANULOCYTES: 65.3 % (ref 42.2–75.2)
CHOL/HDL RATIO (RMG): 2.8 MG/DL (ref 0–4.5)
CHOLESTEROL: 128 MG/DL (ref 100–199)
HCT VFR BLD AUTO: 40.6 % (ref 39–51)
HDL (RMG): 45 MG/DL (ref 40–?)
HEMOGLOBIN: 13.9 G/DL (ref 13.4–17.5)
LDL CALCULATED (RMG): 69 MG/DL (ref 0–130)
LYMPHOCYTES NFR BLD AUTO: 25.7 % (ref 20.5–51.1)
MCH RBC QN AUTO: 30.6 PG (ref 27–31)
MCHC RBC AUTO-ENTMCNC: 34.2 G/DL (ref 33–37)
MCV RBC AUTO: 89.2 FL (ref 80–100)
MONOCYTES NFR BLD AUTO: 9 % (ref 1.7–9.3)
PLATELET # BLD AUTO: 178 K/UL (ref 140–450)
RBC # BLD AUTO: 4.55 X10/CMM (ref 4.2–5.9)
TRIGLYCERIDES (RMG): 70 MG/DL (ref 0–149)
WBC # BLD AUTO: 8.2 X10/CMM (ref 3.8–11)

## 2022-10-03 PROCEDURE — 80061 LIPID PANEL: CPT | Mod: QW | Performed by: FAMILY MEDICINE

## 2022-10-03 PROCEDURE — 99214 OFFICE O/P EST MOD 30 MIN: CPT | Mod: 25 | Performed by: FAMILY MEDICINE

## 2022-10-03 PROCEDURE — G0439 PPPS, SUBSEQ VISIT: HCPCS | Performed by: FAMILY MEDICINE

## 2022-10-03 PROCEDURE — 85025 COMPLETE CBC W/AUTO DIFF WBC: CPT | Performed by: FAMILY MEDICINE

## 2022-10-03 RX ORDER — HYDROCHLOROTHIAZIDE 12.5 MG/1
12.5 CAPSULE ORAL DAILY
Qty: 90 CAPSULE | Refills: 3 | Status: SHIPPED | OUTPATIENT
Start: 2022-10-03 | End: 2023-10-09

## 2022-10-03 RX ORDER — NITROGLYCERIN 0.4 MG/1
TABLET SUBLINGUAL
Qty: 25 TABLET | Refills: 3 | Status: SHIPPED | OUTPATIENT
Start: 2022-10-03 | End: 2023-10-09

## 2022-10-03 RX ORDER — KETOCONAZOLE 20 MG/G
CREAM TOPICAL
Qty: 60 G | Refills: 0 | Status: SHIPPED | OUTPATIENT
Start: 2022-10-03 | End: 2023-04-13

## 2022-10-03 RX ORDER — AMLODIPINE BESYLATE 5 MG/1
5 TABLET ORAL DAILY
Qty: 90 TABLET | Refills: 3 | Status: SHIPPED | OUTPATIENT
Start: 2022-10-03 | End: 2023-10-09

## 2022-10-03 RX ORDER — AMOXICILLIN 500 MG/1
CAPSULE ORAL
Qty: 4 CAPSULE | Refills: 4 | Status: SHIPPED | OUTPATIENT
Start: 2022-10-03

## 2022-10-03 RX ORDER — ROSUVASTATIN CALCIUM 20 MG/1
20 TABLET, COATED ORAL DAILY
Qty: 90 TABLET | Refills: 3 | Status: SHIPPED | OUTPATIENT
Start: 2022-10-03 | End: 2023-08-31

## 2022-10-03 ASSESSMENT — ASTHMA QUESTIONNAIRES: ACT_TOTALSCORE: 21

## 2022-10-04 LAB
ALBUMIN SERPL-MCNC: 4.6 G/DL (ref 3.8–4.8)
ALBUMIN/GLOB SERPL: 2.4 {RATIO} (ref 1.2–2.2)
ALP SERPL-CCNC: 67 IU/L (ref 44–121)
ALT SERPL-CCNC: 20 IU/L (ref 0–44)
AST SERPL-CCNC: 17 IU/L (ref 0–40)
BILIRUB SERPL-MCNC: 0.4 MG/DL (ref 0–1.2)
BUN SERPL-MCNC: 26 MG/DL (ref 8–27)
BUN/CREATININE RATIO: 25 (ref 10–24)
CALCIUM SERPL-MCNC: 8.8 MG/DL (ref 8.6–10.2)
CHLORIDE SERPLBLD-SCNC: 102 MMOL/L (ref 96–106)
CREAT SERPL-MCNC: 1.03 MG/DL (ref 0.76–1.27)
EGFR: 78 ML/MIN/1.73
GLOBULIN, TOTAL: 1.9 G/DL (ref 1.5–4.5)
GLUCOSE SERPL-MCNC: 108 MG/DL (ref 70–99)
POTASSIUM SERPL-SCNC: 4.3 MMOL/L (ref 3.5–5.2)
PROT SERPL-MCNC: 6.5 G/DL (ref 6–8.5)
PSA NG/ML: 1.1 NG/ML (ref 0–4)
SODIUM SERPL-SCNC: 140 MMOL/L (ref 134–144)
TOTAL CO2: 28 MMOL/L (ref 20–29)

## 2022-10-07 NOTE — RESULT ENCOUNTER NOTE
Dear Peter,     I am writing to report that your included test results are as expected.    Many labs contain some results that are slightly outside of the normal range, I have reviewed any of these results and they require no changes at this time.    Willie Nam MD   Patient ambulated to the [facility] and report given     Jaswant Alonzo RN  11/12/21 1936

## 2022-10-19 DIAGNOSIS — B35.1 ONYCHOMYCOSIS: Primary | ICD-10-CM

## 2022-10-20 RX ORDER — TERBINAFINE HYDROCHLORIDE 250 MG/1
250 TABLET ORAL DAILY
Qty: 90 TABLET | Refills: 0 | Status: SHIPPED | OUTPATIENT
Start: 2022-10-20 | End: 2022-10-24

## 2022-10-24 NOTE — TELEPHONE ENCOUNTER
Patient calls to clarify medication. States picked up terbinafine today but unsure if and why should be taking.   Reviewed with patient that we received call from [facility] 10/19/22 requesting refill on this med but that per Dr. Cedeno's 8/23/22 note, patient was to stop taking the terbinafine as had taken x 3 months and toenail had fallen off during that time. Patient agrees and is now recalling the situation. States requested refill of terbinafine at [facility]'s prompting text message and answered yes to refill. He will not take this med at this time.   Argelia Abreu RN

## 2022-11-01 DIAGNOSIS — M79.672 LEFT FOOT PAIN: ICD-10-CM

## 2022-11-01 RX ORDER — NAPROXEN 500 MG/1
TABLET ORAL
Qty: 180 TABLET | Refills: 3 | Status: SHIPPED | OUTPATIENT
Start: 2022-11-01 | End: 2023-11-02

## 2022-11-15 DIAGNOSIS — N28.9 RENAL INSUFFICIENCY: ICD-10-CM

## 2022-11-15 DIAGNOSIS — I10 ESSENTIAL HYPERTENSION: Primary | ICD-10-CM

## 2022-11-15 DIAGNOSIS — I25.10 CORONARY ARTERY DISEASE INVOLVING NATIVE CORONARY ARTERY OF NATIVE HEART WITHOUT ANGINA PECTORIS: ICD-10-CM

## 2022-11-15 DIAGNOSIS — E78.00 HYPERCHOLESTEREMIA: ICD-10-CM

## 2022-11-21 ENCOUNTER — LAB (OUTPATIENT)
Dept: LAB | Facility: CLINIC | Age: 72
End: 2022-11-21
Payer: COMMERCIAL

## 2022-11-21 DIAGNOSIS — I10 ESSENTIAL HYPERTENSION: ICD-10-CM

## 2022-11-21 DIAGNOSIS — I25.10 CORONARY ARTERY DISEASE INVOLVING NATIVE CORONARY ARTERY OF NATIVE HEART WITHOUT ANGINA PECTORIS: ICD-10-CM

## 2022-11-21 DIAGNOSIS — E78.00 HYPERCHOLESTEREMIA: ICD-10-CM

## 2022-11-21 DIAGNOSIS — N28.9 RENAL INSUFFICIENCY: ICD-10-CM

## 2022-11-21 LAB
ALT SERPL W P-5'-P-CCNC: 30 U/L (ref 0–70)
ANION GAP SERPL CALCULATED.3IONS-SCNC: 4 MMOL/L (ref 3–14)
BUN SERPL-MCNC: 20 MG/DL (ref 7–30)
CALCIUM SERPL-MCNC: 9.1 MG/DL (ref 8.5–10.1)
CHLORIDE BLD-SCNC: 105 MMOL/L (ref 94–109)
CHOLEST SERPL-MCNC: 133 MG/DL
CO2 SERPL-SCNC: 28 MMOL/L (ref 20–32)
CREAT SERPL-MCNC: 1.01 MG/DL (ref 0.66–1.25)
FASTING STATUS PATIENT QL REPORTED: YES
GFR SERPL CREATININE-BSD FRML MDRD: 80 ML/MIN/1.73M2
GLUCOSE BLD-MCNC: 111 MG/DL (ref 70–99)
HDLC SERPL-MCNC: 53 MG/DL
LDLC SERPL CALC-MCNC: 65 MG/DL
NONHDLC SERPL-MCNC: 80 MG/DL
POTASSIUM BLD-SCNC: 3.9 MMOL/L (ref 3.4–5.3)
SODIUM SERPL-SCNC: 137 MMOL/L (ref 133–144)
TRIGL SERPL-MCNC: 76 MG/DL

## 2022-11-21 PROCEDURE — 80061 LIPID PANEL: CPT | Performed by: INTERNAL MEDICINE

## 2022-11-21 PROCEDURE — 80048 BASIC METABOLIC PNL TOTAL CA: CPT | Performed by: INTERNAL MEDICINE

## 2022-11-21 PROCEDURE — 36415 COLL VENOUS BLD VENIPUNCTURE: CPT | Performed by: INTERNAL MEDICINE

## 2022-11-21 PROCEDURE — 84460 ALANINE AMINO (ALT) (SGPT): CPT | Performed by: INTERNAL MEDICINE

## 2022-11-22 ENCOUNTER — OFFICE VISIT (OUTPATIENT)
Dept: CARDIOLOGY | Facility: CLINIC | Age: 72
End: 2022-11-22
Payer: COMMERCIAL

## 2022-11-22 VITALS
HEART RATE: 55 BPM | SYSTOLIC BLOOD PRESSURE: 128 MMHG | WEIGHT: 220.5 LBS | HEIGHT: 73 IN | DIASTOLIC BLOOD PRESSURE: 70 MMHG | OXYGEN SATURATION: 97 % | BODY MASS INDEX: 29.22 KG/M2

## 2022-11-22 DIAGNOSIS — I10 ESSENTIAL HYPERTENSION: ICD-10-CM

## 2022-11-22 DIAGNOSIS — I25.10 CORONARY ARTERY DISEASE INVOLVING NATIVE CORONARY ARTERY OF NATIVE HEART WITHOUT ANGINA PECTORIS: Primary | ICD-10-CM

## 2022-11-22 DIAGNOSIS — E78.00 HYPERCHOLESTEREMIA: ICD-10-CM

## 2022-11-22 PROCEDURE — 99214 OFFICE O/P EST MOD 30 MIN: CPT | Performed by: INTERNAL MEDICINE

## 2022-11-22 NOTE — PROGRESS NOTES
HPI and Plan:   See dictation          Orders Placed This Encounter   Procedures     Basic metabolic panel     Lipid Profile     ALT     Follow-Up with Cardiology       No orders of the defined types were placed in this encounter.      There are no discontinued medications.      Encounter Diagnoses   Name Primary?     Coronary artery disease involving native coronary artery of native heart without angina pectoris Yes     Essential hypertension      Hypercholesteremia        CURRENT MEDICATIONS:  Current Outpatient Medications   Medication Sig Dispense Refill     acetaminophen (TYLENOL) 325 MG tablet Take 2 tablets (650 mg) by mouth every 4 hours as needed for other (mild pain) 100 tablet 0     albuterol (VENTOLIN HFA) 108 (90 Base) MCG/ACT inhaler Inhale 2 puffs into the lungs every 6 hours as needed for shortness of breath / dyspnea or wheezing 3 Inhaler 3     amLODIPine (NORVASC) 5 MG tablet Take 1 tablet (5 mg) by mouth daily 90 tablet 3     amoxicillin (AMOXIL) 500 MG capsule TAKE 4 CAPSULES BY MOUTH 1 HOUR PRIOR TO DENTAL APPOINTMENT 4 capsule 4     Ascorbic Acid (VITAMIN C) 500 MG CAPS Take 500 mg by mouth 2 times daily (with meals)        aspirin (ASA) 81 MG EC tablet Take 81 mg by mouth daily       hydrochlorothiazide (MICROZIDE) 12.5 MG capsule Take 1 capsule (12.5 mg) by mouth daily 90 capsule 3     ketoconazole (NIZORAL) 2 % external cream APPLY TOPICALLY DAILY 60 g 0     lisinopril (ZESTRIL) 10 MG tablet TAKE 1 TABLET (10 MG) BY MOUTH EVERY MORNING 90 tablet 3     Multiple Vitamin (DAILY MULTIVITAMIN PO) Take 2 capsules by mouth every morning       Naphazoline-Pheniramine (OPCON-A OP) Place 1 drop into both eyes daily as needed (cat allergies)       naproxen (NAPROSYN) 500 MG tablet TAKE 1 TABLET BY MOUTH TWICE A DAY WITH MEALS 180 tablet 3     nitroGLYcerin (NITROSTAT) 0.4 MG sublingual tablet For chest pain place 1 tablet under the tongue every 5 minutes for 3 doses. If symptoms persist 5 minutes after  1st dose call 911. 25 tablet 3     Probiotic Product (PROBIOTIC-10 PO) Take 1 capsule by mouth every morning        Psyllium (METAMUCIL FIBER PO) Take 1-2 teaspoonful by mouth daily as needed (mix with a glass of water and drink)       rosuvastatin (CRESTOR) 20 MG tablet Take 1 tablet (20 mg) by mouth daily 90 tablet 3     senna-docusate (SENOKOT-S/PERICOLACE) 8.6-50 MG tablet Take 1-2 tablets by mouth 2 times daily Take while on oral narcotics to prevent or treat constipation. 30 tablet 0     sildenafil (VIAGRA) 50 MG tablet Take 1 tablet (50 mg) by mouth daily as needed 30 tablet 1     SYMBICORT 80-4.5 MCG/ACT Inhaler INHALE 1 PUFF TWICE A DAY       Vitamin D, Cholecalciferol, 1000 units CAPS Take 3,000 Units by mouth every morning        zinc gluconate 50 MG tablet Take 50 mg by mouth every morning          ALLERGIES     Allergies   Allergen Reactions     Cats      Simvastatin Muscle Pain (Myalgia)     achey       PAST MEDICAL HISTORY:  Past Medical History:   Diagnosis Date     ACP (advance care planning)     will bring copy in      Arthritis      Bruit      CAD (coronary artery disease) 2002    a stent     CKD (chronic kidney disease)      Esophageal reflux 5/17/2013     Family history of ischemic heart disease      Hyperlipidaemia      Hypertension        PAST SURGICAL HISTORY:  Past Surgical History:   Procedure Laterality Date     ARTHROPLASTY HIP Right 2/23/2021    Procedure: RIGHT TOTAL HIP ARTHROPLASTY;  Surgeon: Gera Davison MD;  Location: [facility] OR     CYSTECTOMY PILONIDAL       HEART CATH, ANGIOPLASTY  10/4/02    3.0 X 8 mm Velocity stent     ROTATOR CUFF REPAIR RT/LT  2009    Dr. Butler       FAMILY HISTORY:  Family History   Problem Relation Age of Onset     Cerebrovascular Disease Mother      C.A.D. Father      Cancer Father         bone     Coronary Artery Disease Brother      Heart Disease Brother      No Known Problems Brother      No Known Problems Sister      No Known Problems Sister   "      SOCIAL HISTORY:  Social History     Socioeconomic History     Marital status:      Spouse name: None     Number of children: None     Years of education: None     Highest education level: None   Occupational History     Occupation: Finance   Tobacco Use     Smoking status: Never     Smokeless tobacco: Never   Substance and Sexual Activity     Alcohol use: Yes     Alcohol/week: 0.8 standard drinks     Types: 1 Standard drinks or equivalent per week     Comment: occasionally     Drug use: No     Sexual activity: Yes     Partners: Female   Other Topics Concern     Parent/sibling w/ CABG, MI or angioplasty before 65F 55M? No     Sleep Concern Yes     Comment: occ     Stress Concern No     Exercise Yes     Comment: walks daily       Review of Systems:  Skin:          Eyes:         ENT:         Respiratory:          Cardiovascular:         Gastroenterology:        Genitourinary:         Musculoskeletal:         Neurologic:         Psychiatric:         Heme/Lymph/Imm:         Endocrine:           Physical Exam:  Vitals: /70   Pulse 55   Ht 1.854 m (6' 1\")   Wt 100 kg (220 lb 8 oz)   SpO2 97%   BMI 29.09 kg/m      Constitutional:  cooperative, alert and oriented, well developed, well nourished, in no acute distress overweight      Skin:  warm and dry to the touch, no apparent skin lesions or masses noted          Head:  normocephalic, no masses or lesions        Eyes:  pupils equal and round, conjunctivae and lids unremarkable, sclera white, no xanthalasma, EOMS intact, no nystagmus        Lymph:No Cervical lymphadenopathy present     ENT:  no pallor or cyanosis, dentition good        Neck:  carotid pulses are full and equal bilaterally, JVP normal, no carotid bruit        Respiratory:  normal breath sounds, clear to auscultation, normal A-P diameter, normal symmetry, normal respiratory excursion, no use of accessory muscles         Cardiac: regular rhythm;normal S1 and S2;apical impulse not " displaced;no murmurs, gallops or rubs detected   S4            pulses full and equal, no bruits auscultated                                        GI:  not assessed this visit        Extremities and Muscular Skeletal:  no deformities, clubbing, cyanosis, erythema observed;no edema              Neurological:  no gross motor deficits;affect appropriate        Psych:  Alert and Oriented x 3        CC  No referring provider defined for this encounter.

## 2022-11-22 NOTE — LETTER
11/22/2022    Willie Nam MD  [address]  [facility] [address]    RE: Peter Hernandez II       Dear Colleague,     I had the pleasure of seeing Peter Hernandez II in the [facility].  HPI and Plan:   See dictation          Orders Placed This Encounter   Procedures     Basic metabolic panel     Lipid Profile     ALT     Follow-Up with Cardiology       No orders of the defined types were placed in this encounter.      There are no discontinued medications.      Encounter Diagnoses   Name Primary?     Coronary artery disease involving native coronary artery of native heart without angina pectoris Yes     Essential hypertension      Hypercholesteremia        CURRENT MEDICATIONS:  Current Outpatient Medications   Medication Sig Dispense Refill     acetaminophen (TYLENOL) 325 MG tablet Take 2 tablets (650 mg) by mouth every 4 hours as needed for other (mild pain) 100 tablet 0     albuterol (VENTOLIN HFA) 108 (90 Base) MCG/ACT inhaler Inhale 2 puffs into the lungs every 6 hours as needed for shortness of breath / dyspnea or wheezing 3 Inhaler 3     amLODIPine (NORVASC) 5 MG tablet Take 1 tablet (5 mg) by mouth daily 90 tablet 3     amoxicillin (AMOXIL) 500 MG capsule TAKE 4 CAPSULES BY MOUTH 1 HOUR PRIOR TO DENTAL APPOINTMENT 4 capsule 4     Ascorbic Acid (VITAMIN C) 500 MG CAPS Take 500 mg by mouth 2 times daily (with meals)        aspirin (ASA) 81 MG EC tablet Take 81 mg by mouth daily       hydrochlorothiazide (MICROZIDE) 12.5 MG capsule Take 1 capsule (12.5 mg) by mouth daily 90 capsule 3     ketoconazole (NIZORAL) 2 % external cream APPLY TOPICALLY DAILY 60 g 0     lisinopril (ZESTRIL) 10 MG tablet TAKE 1 TABLET (10 MG) BY MOUTH EVERY MORNING 90 tablet 3     Multiple Vitamin (DAILY MULTIVITAMIN PO) Take 2 capsules by mouth every morning       Naphazoline-Pheniramine (OPCON-A OP) Place 1 drop into both eyes daily as needed (cat allergies)       naproxen (NAPROSYN) 500 MG tablet  TAKE 1 TABLET BY MOUTH TWICE A DAY WITH MEALS 180 tablet 3     nitroGLYcerin (NITROSTAT) 0.4 MG sublingual tablet For chest pain place 1 tablet under the tongue every 5 minutes for 3 doses. If symptoms persist 5 minutes after 1st dose call 911. 25 tablet 3     Probiotic Product (PROBIOTIC-10 PO) Take 1 capsule by mouth every morning        Psyllium (METAMUCIL FIBER PO) Take 1-2 teaspoonful by mouth daily as needed (mix with a glass of water and drink)       rosuvastatin (CRESTOR) 20 MG tablet Take 1 tablet (20 mg) by mouth daily 90 tablet 3     senna-docusate (SENOKOT-S/PERICOLACE) 8.6-50 MG tablet Take 1-2 tablets by mouth 2 times daily Take while on oral narcotics to prevent or treat constipation. 30 tablet 0     sildenafil (VIAGRA) 50 MG tablet Take 1 tablet (50 mg) by mouth daily as needed 30 tablet 1     SYMBICORT 80-4.5 MCG/ACT Inhaler INHALE 1 PUFF TWICE A DAY       Vitamin D, Cholecalciferol, 1000 units CAPS Take 3,000 Units by mouth every morning        zinc gluconate 50 MG tablet Take 50 mg by mouth every morning          ALLERGIES     Allergies   Allergen Reactions     Cats      Simvastatin Muscle Pain (Myalgia)     achey       PAST MEDICAL HISTORY:  Past Medical History:   Diagnosis Date     ACP (advance care planning)     will bring copy in      Arthritis      Bruit      CAD (coronary artery disease) 2002    a stent     CKD (chronic kidney disease)      Esophageal reflux 5/17/2013     Family history of ischemic heart disease      Hyperlipidaemia      Hypertension        PAST SURGICAL HISTORY:  Past Surgical History:   Procedure Laterality Date     ARTHROPLASTY HIP Right 2/23/2021    Procedure: RIGHT TOTAL HIP ARTHROPLASTY;  Surgeon: Gera Davison MD;  Location: [facility] OR     CYSTECTOMY PILONIDAL       HEART CATH, ANGIOPLASTY  10/4/02    3.0 X 8 mm Velocity stent     ROTATOR CUFF REPAIR RT/LT  2009    Dr. Butler       FAMILY HISTORY:  Family History   Problem Relation Age of Onset     Cerebrovascular  "Disease Mother      C.A.D. Father      Cancer Father         bone     Coronary Artery Disease Brother      Heart Disease Brother      No Known Problems Brother      No Known Problems Sister      No Known Problems Sister        SOCIAL HISTORY:  Social History     Socioeconomic History     Marital status:      Spouse name: None     Number of children: None     Years of education: None     Highest education level: None   Occupational History     Occupation: Finance   Tobacco Use     Smoking status: Never     Smokeless tobacco: Never   Substance and Sexual Activity     Alcohol use: Yes     Alcohol/week: 0.8 standard drinks     Types: 1 Standard drinks or equivalent per week     Comment: occasionally     Drug use: No     Sexual activity: Yes     Partners: Female   Other Topics Concern     Parent/sibling w/ CABG, MI or angioplasty before 65F 55M? No     Sleep Concern Yes     Comment: occ     Stress Concern No     Exercise Yes     Comment: walks daily       Review of Systems:  Skin:          Eyes:         ENT:         Respiratory:          Cardiovascular:         Gastroenterology:        Genitourinary:         Musculoskeletal:         Neurologic:         Psychiatric:         Heme/Lymph/Imm:         Endocrine:           Physical Exam:  Vitals: /70   Pulse 55   Ht 1.854 m (6' 1\")   Wt 100 kg (220 lb 8 oz)   SpO2 97%   BMI 29.09 kg/m      Constitutional:  cooperative, alert and oriented, well developed, well nourished, in no acute distress overweight      Skin:  warm and dry to the touch, no apparent skin lesions or masses noted          Head:  normocephalic, no masses or lesions        Eyes:  pupils equal and round, conjunctivae and lids unremarkable, sclera white, no xanthalasma, EOMS intact, no nystagmus        Lymph:No Cervical lymphadenopathy present     ENT:  no pallor or cyanosis, dentition good        Neck:  carotid pulses are full and equal bilaterally, JVP normal, no carotid bruit    "     Respiratory:  normal breath sounds, clear to auscultation, normal A-P diameter, normal symmetry, normal respiratory excursion, no use of accessory muscles         Cardiac: regular rhythm;normal S1 and S2;apical impulse not displaced;no murmurs, gallops or rubs detected   S4            pulses full and equal, no bruits auscultated                                        GI:  not assessed this visit        Extremities and Muscular Skeletal:  no deformities, clubbing, cyanosis, erythema observed;no edema              Neurological:  no gross motor deficits;affect appropriate        Psych:  Alert and Oriented x 3      CC  No referring provider defined for this encounter.    Thank you for allowing me to participate in the care of your patient.      Sincerely,     Braulio Blanca MD, MD   [facility]  cc: No referring provider defined for this encounter.

## 2022-11-22 NOTE — PROGRESS NOTES
Service Date: 11/22/2022    REFERRING PHYSICIAN:  Willie Nam MD    HISTORY OF PRESENT ILLNESS:  It was a pleasure to see your patient, Peter Hernandez, in followup.  He is an extremely nice 72-year-old man who, as you know, has a history of coronary artery disease.  He underwent stenting of the proximal right coronary artery and had mild coronary artery disease elsewhere.  This patient has a history of hypercholesterolemia and essential hypertension.  Since I last saw him, he has done well.  He has no chest pains, no chest pressure, no unusual shortness of breath.  He exercises regularly and nearly daily.  His blood pressure is well controlled here today at 128/70.  His lipid profile, which was drawn yesterday was excellent.  The LDL was 65, HDL was 53 and triglycerides were 76 with a total cholesterol of 133 and his ALT was normal at 30, indicating no hepatic toxicity.  Because the patient is on hydrochlorothiazide and lisinopril, we do check his basic metabolic profile every year.  Sodium is 137, potassium is 3.9, BUN is 20 and creatinine is 1.01 with a GFR of 80.    IMPRESSION:    1.  Coronary artery disease and status post right coronary artery stenting.  The patient is asymptomatic with respect to coronary artery disease with no symptoms suggesting angina pectoris.  2.  Excellent lipid profile, well within secondary prevention guidelines with no evidence of hepatic toxicity with an ALT of 30.  3.  Essential hypertension.  His blood pressure is well controlled at 128/70.    PLAN:  We will continue the patient on his good present medications.  We will see him back again in 1 year's time, but as always, he has been told to contact us if he has any questions or any concerns.  We will recheck his basic metabolic profile and lipid profile at that time.      It is my pleasure to be involved in the care of this very nice patient.  I look forward to seeing him again.    Braulio Blanca MD    cc:  Willie Nam,  MD  [facility]  [address] [place]  [address]      Braulio Aparicio MD, [facility]        D: 2022   T: 2022   MT: marcy    Name:     NATALY CONTRERAS  MRN:      [id number]        Account:      [id number]   :      1950           Service Date: 2022       Document: [id number]

## 2023-02-16 ENCOUNTER — OFFICE VISIT (OUTPATIENT)
Dept: FAMILY MEDICINE | Facility: CLINIC | Age: 73
End: 2023-02-16

## 2023-02-16 VITALS
DIASTOLIC BLOOD PRESSURE: 68 MMHG | WEIGHT: 224 LBS | HEART RATE: 71 BPM | BODY MASS INDEX: 29.55 KG/M2 | SYSTOLIC BLOOD PRESSURE: 152 MMHG | RESPIRATION RATE: 16 BRPM | OXYGEN SATURATION: 98 %

## 2023-02-16 DIAGNOSIS — I10 BENIGN ESSENTIAL HYPERTENSION: ICD-10-CM

## 2023-02-16 DIAGNOSIS — N18.31 STAGE 3A CHRONIC KIDNEY DISEASE (H): ICD-10-CM

## 2023-02-16 DIAGNOSIS — B07.0 PLANTAR WARTS: Primary | ICD-10-CM

## 2023-02-16 DIAGNOSIS — I70.0 ATHEROSCLEROSIS OF AORTA (H): ICD-10-CM

## 2023-02-16 DIAGNOSIS — I25.118 CORONARY ARTERY DISEASE OF NATIVE ARTERY OF NATIVE HEART WITH STABLE ANGINA PECTORIS (H): ICD-10-CM

## 2023-02-16 PROBLEM — N18.2 CKD (CHRONIC KIDNEY DISEASE) STAGE 2, GFR 60-89 ML/MIN: Status: RESOLVED | Noted: 2019-09-23 | Resolved: 2023-02-16

## 2023-02-16 PROCEDURE — 17110 DESTRUCTION B9 LES UP TO 14: CPT | Performed by: FAMILY MEDICINE

## 2023-02-16 PROCEDURE — 99213 OFFICE O/P EST LOW 20 MIN: CPT | Mod: 25 | Performed by: FAMILY MEDICINE

## 2023-02-16 RX ORDER — AMLODIPINE BESYLATE 5 MG/1
TABLET ORAL EVERY 24 HOURS
COMMUNITY
Start: 2021-11-01 | End: 2023-02-16

## 2023-02-16 NOTE — PROGRESS NOTES
SUBJECTIVE:    Peter Hernandez II, is a 72 year old male presenting for the below:     1. Warts to sole of left foot. Has attempted paring down at home with no effect.     OBJECTIVE:  Vitals:    02/16/23 0858   BP: (!) 152/68   Pulse: 71   Resp: 16   SpO2: 98%   Weight: 101.6 kg (224 lb)    Body mass index is 29.55 kg/m .    General: no acute distress, cooperative with exam.  Skin: 3 x small plantar warts to right foot, lateral boarder.     BP Readings from Last 6 Encounters:   02/16/23 (!) 152/68   11/22/22 128/70   10/03/22 135/75   08/23/22 130/72   07/12/22 133/70   05/16/22 (!) 142/68       Wart cryotherapy procedure     Location: as above    The procedure, risks and complications were discussed. Verbal consent was obtained for the procedure.     PROCEDURE:     Cryotherapy with liquid nitrogen, was applied for 3 freeze thaw cycles to 3 lesions. Frost ring obtained at each cycle.       ASSESSMENT / PLAN:      Plantar warts  Follow up: The patient tolerated the procedure well without complications.  Standard post-procedure care was explained. Should return for further freezing sessions as needed.   -     DESTRUCT BENIGN LESION, UP TO 14    Benign essential hypertension   Stage 3a chronic kidney disease (H)  Atherosclerosis of aorta (H)  Coronary artery disease of native artery of native heart with stable angina pectoris (H)  Usually well controlled. Recheck at blood pressure at follow up. Taking statin. On ACEi for ashli protective effect in CKD.

## 2023-04-13 DIAGNOSIS — Z76.0 ENCOUNTER FOR MEDICATION REFILL: Primary | ICD-10-CM

## 2023-04-13 DIAGNOSIS — B37.2 YEAST INFECTION OF THE SKIN: ICD-10-CM

## 2023-04-13 RX ORDER — DILTIAZEM HYDROCHLORIDE 60 MG/1
TABLET, FILM COATED ORAL
Qty: 10.2 G | Refills: 5 | Status: SHIPPED | OUTPATIENT
Start: 2023-04-13 | End: 2023-10-09

## 2023-04-13 RX ORDER — KETOCONAZOLE 20 MG/G
CREAM TOPICAL
Qty: 60 G | Refills: 0 | Status: SHIPPED | OUTPATIENT
Start: 2023-04-13 | End: 2023-10-27

## 2023-04-28 ENCOUNTER — DOCUMENTATION ONLY (OUTPATIENT)
Dept: OTHER | Facility: CLINIC | Age: 73
End: 2023-04-28
Payer: COMMERCIAL

## 2023-07-17 DIAGNOSIS — I10 BENIGN ESSENTIAL HYPERTENSION: ICD-10-CM

## 2023-07-17 NOTE — TELEPHONE ENCOUNTER
Lisinopril    LOV 2/16/23  Benign essential hypertension   Stage 3a chronic kidney disease (H)  Atherosclerosis of aorta (H)  Coronary artery disease of native artery of native heart with stable angina pectoris (H)  Usually well controlled. Recheck at blood pressure at follow up. Taking statin. On ACEi for ashli protective effect in CKD.    Last labs 10/3/22  BP Readings from Last 3 Encounters:   02/16/23 (!) 152/68   11/22/22 128/70   10/03/22 135/75

## 2023-07-18 RX ORDER — LISINOPRIL 10 MG/1
10 TABLET ORAL EVERY MORNING
Qty: 90 TABLET | Refills: 3 | Status: SHIPPED | OUTPATIENT
Start: 2023-07-18 | End: 2023-10-09

## 2023-08-09 ENCOUNTER — HOSPITAL REPORTS SCAN (OUTPATIENT)
Dept: FAMILY MEDICINE | Facility: CLINIC | Age: 73
End: 2023-08-09

## 2023-08-31 ENCOUNTER — OFFICE VISIT (OUTPATIENT)
Dept: FAMILY MEDICINE | Facility: CLINIC | Age: 73
End: 2023-08-31

## 2023-08-31 VITALS
DIASTOLIC BLOOD PRESSURE: 85 MMHG | BODY MASS INDEX: 29.69 KG/M2 | HEART RATE: 55 BPM | OXYGEN SATURATION: 96 % | WEIGHT: 224 LBS | SYSTOLIC BLOOD PRESSURE: 144 MMHG | HEIGHT: 73 IN

## 2023-08-31 DIAGNOSIS — E78.00 HYPERCHOLESTEREMIA: Primary | ICD-10-CM

## 2023-08-31 DIAGNOSIS — S89.92XA INJURY OF LEFT KNEE, INITIAL ENCOUNTER: ICD-10-CM

## 2023-08-31 DIAGNOSIS — N18.31 STAGE 3A CHRONIC KIDNEY DISEASE (H): ICD-10-CM

## 2023-08-31 PROCEDURE — G0008 ADMIN INFLUENZA VIRUS VAC: HCPCS | Performed by: FAMILY MEDICINE

## 2023-08-31 PROCEDURE — 99213 OFFICE O/P EST LOW 20 MIN: CPT | Performed by: FAMILY MEDICINE

## 2023-08-31 PROCEDURE — 90694 VACC AIIV4 NO PRSRV 0.5ML IM: CPT | Performed by: FAMILY MEDICINE

## 2023-08-31 RX ORDER — ROSUVASTATIN CALCIUM 20 MG/1
20 TABLET, COATED ORAL DAILY
Qty: 90 TABLET | Refills: 3 | Status: SHIPPED | OUTPATIENT
Start: 2023-08-31 | End: 2023-10-09

## 2023-08-31 ASSESSMENT — ASTHMA QUESTIONNAIRES: ACT_TOTALSCORE: 22

## 2023-08-31 NOTE — PROGRESS NOTES
"Hurt his knee missing a step, skinned his knee's   A little ongoing pain  Due for flu    Problem(s) Oriented visit      ROS:  General and Resp. completed and negative except as noted above     HISTORY:   reports current alcohol use of about 0.8 standard drinks of alcohol per week.   reports that he has never smoked. He has never used smokeless tobacco.    Past Medical History:   Diagnosis Date    ACP (advance care planning)     Arthritis     Bruit     CAD (coronary artery disease) 2002    CKD (chronic kidney disease)     Esophageal reflux 5/17/2013    Family history of ischemic heart disease     Hyperlipidaemia     Hypertension      Past Surgical History:   Procedure Laterality Date    ARTHROPLASTY HIP Right 2/23/2021    Procedure: RIGHT TOTAL HIP ARTHROPLASTY;  Surgeon: Gera Davison MD;  Location: [facility] OR    CYSTECTOMY PILONIDAL      HEART CATH, ANGIOPLASTY  10/4/02    3.0 X 8 mm Velocity stent    ROTATOR CUFF REPAIR RT/LT  2009    Dr. Butler       EXAM:  BP: 144/85   Pulse: 55    Temp: Data Unavailable    Wt Readings from Last 2 Encounters:   08/31/23 101.6 kg (224 lb)   02/16/23 101.6 kg (224 lb)       BMI= Body mass index is 29.55 kg/m .    EXAM:  APPEARANCE: = Relaxed and in no distress  Ear canals and TM's = Canals are not inflammed and have none or little wax and the drums are not injected and have a light reflex   Lips/Teeth/Gums = No lesions seen, good dentition, and gums seem healthy  Musculsktl =  Strength and ROM of major joints are within normal limits      Assessment/Plan:  Peter was seen today for musculoskeletal problem.    Diagnoses and all orders for this visit:    Hypercholesteremia    Injury of left knee, initial encounter    Other orders  -     Microalbumin ([facility])        COUNSELING:   reports that he has never smoked. He has never used smokeless tobacco.    Estimated body mass index is 29.55 kg/m  as calculated from the following:    Height as of this encounter: 1.854 m (6' 1\").    Weight " as of this encounter: 101.6 kg (224 lb).       Appropriate preventive services were discussed with this patient, including applicable screening as appropriate for cardiovascular disease, diabetes, osteopenia/osteoporosis, and glaucoma.  As appropriate for age/gender, discussed screening for colorectal cancer, prostate cancer, breast cancer, and cervical cancer. Checklist reviewing preventive services available has been given to the patient.    Reviewed patients plan of care and provided an AVS. The Basic Care Plan (routine screening as documented in Health Maintenance) for Peter meets the Care Plan requirement. This Care Plan has been established and reviewed with the  Patient.      The following health maintenance items are reviewed in Epic and correct as of today:  Health Maintenance   Topic Date Due    PHQ-2 (once per calendar year)  01/01/2023    COVID-19 Vaccine (5 - Moderna series) 02/03/2023    MICROALBUMIN  02/04/2023    ASTHMA CONTROL TEST  04/03/2023    HEMOGLOBIN  10/03/2023    INFLUENZA VACCINE (1) 09/01/2023    MEDICARE ANNUAL WELLNESS VISIT  10/03/2023    ASTHMA ACTION PLAN  10/03/2023    FALL RISK ASSESSMENT  10/03/2023    DTAP/TDAP/TD IMMUNIZATION (2 - Td or Tdap) 10/24/2023    BMP  11/21/2023    LIPID  11/21/2023    ADVANCE CARE PLANNING  04/28/2028    COLORECTAL CANCER SCREENING  11/01/2031    HEPATITIS C SCREENING  Completed    Pneumococcal Vaccine: 65+ Years  Completed    URINALYSIS  Completed    ZOSTER IMMUNIZATION  Completed    AORTIC ANEURYSM SCREENING (SYSTEM ASSIGNED)  Completed    IPV IMMUNIZATION  Aged Out    HPV IMMUNIZATION  Aged Out    MENINGITIS IMMUNIZATION  Aged Out       Willie Nam  [facility]  For any issues my office # is [phone]

## 2023-10-06 NOTE — PROGRESS NOTES
"SUBJECTIVE:   Patient today who presents for Preventive Visit.       Patient has been advised of split billing requirements and indicates understanding: Yes  Are you in the first 12 months of your Medicare Part B coverage?  No    Physical Health:  In general, how would you rate your overall physical health? excellent  Outside of work, how many days during the week do you exercise? 4-5 days/week  Outside of work, approximately how many minutes a day do you exercise?15-30 minutes  If you drink alcohol do you typically have >3 drinks per day or >7 drinks per week? No  Do you usually eat at least 4 servings of fruit and vegetables a day, include whole grains & fiber and avoid regularly eating high fat or \"junk\" foods? NO  Do you have any problems taking medications regularly?  No  Do you have any side effects from medications? none  Needs assistance for the following daily activities: no assistance needed  Which of the following safety concerns are present in your home?  none identified   Hearing impairment: No  HEARING FREQUENCY:   Right Ear:     500 Hz :  pass   1000 Hz:  pass   2000 Hz:  fail   4000 Hz:  fail  Left Ear:     500 Hz :  pass   1000 Hz:  pass   2000 Hz:  fail   4000 Hz   pass  Shawnee Mackenzie CMA. 10/9/2023   In the past 6 months, have you been bothered by leaking of urine? no  Mental Health:  In general, how would you rate your overall mental or emotional health? excellent  Do you feel safe in your environment? Yes  Have you ever done Advance Care Planning? (For example, a Health Directive, POLST, or a discussion with a medical provider or your loved ones about your wishes): Yes, advance care planning is on file.  Do you have sleep apnea, excessive snoring or daytime drowsiness? : no  Healthy Habits:  Have you had an eye exam in the past two years? yes   Do you see a dentist twice per year? yes, 4 times   What is your current smoking status? no  Do you use smokeless tobacco? no  Abuse: Current or " Past(Physical, Sexual or Emotional)- No     PHQ-2 Score:    Fall risk:  Fallen 2 or more times in the past year?: No  Any fall with injury in the past year?: No    Cognitive Screenin) Repeat 3 items (Leader, Season, Table)    2) Clock draw: NORMAL  3) 3 item recall: Recalls 3 objects  Results: 3 items recalled: COGNITIVE IMPAIRMENT LESS LIKELY    Mini-CogTM Copyright TYLER Ashley. Licensed by the author for use in [facility]; reprinted with permission (shannon@[facility]). All rights reserved.      Additional concerns to address?  YES    PROBLEMS TO ADD ON...    Reviewed and updated as needed this visit by clinical staff    Allergies  Meds  Problems             Reviewed and updated as needed this visit by Provider     Meds  Problems            Social History     Tobacco Use    Smoking status: Never    Smokeless tobacco: Never   Substance Use Topics    Alcohol use: Yes     Alcohol/week: 0.8 standard drinks of alcohol     Types: 1 Standard drinks or equivalent per week     Comment: occasionally              No data to display              Do you have a current opioid prescription? No  Do you use any other controlled substances or medications that are not prescribed by a provider? None                      Current providers sharing in care for this patient include:   Patient Care Team:  Willie Nam MD as PCP - General (Family Practice)  Braulio Blanca MD as MD (Cardiology)  Willie Nam MD as Assigned PCP  Braulio Blanca MD as Assigned Heart and Vascular Provider    The following health maintenance items are reviewed in Epic and correct as of today:  Health Maintenance   Topic Date Due    MICROALBUMIN  2023    COVID-19 Vaccine ( season) 2023    HEMOGLOBIN  10/03/2023    DTAP/TDAP/TD IMMUNIZATION (2 - Td or Tdap) 10/24/2023    BMP  2023    LIPID  2023    ASTHMA CONTROL TEST  2024    MEDICARE ANNUAL WELLNESS VISIT   "10/09/2024    ASTHMA ACTION PLAN  10/09/2024    FALL RISK ASSESSMENT  10/09/2024    ADVANCE CARE PLANNING  10/09/2028    COLORECTAL CANCER SCREENING  11/01/2031    HEPATITIS C SCREENING  Completed    PHQ-2 (once per calendar year)  Completed    INFLUENZA VACCINE  Completed    Pneumococcal Vaccine: 65+ Years  Completed    URINALYSIS  Completed    ZOSTER IMMUNIZATION  Completed    AORTIC ANEURYSM SCREENING (SYSTEM ASSIGNED)  Completed    IPV IMMUNIZATION  Aged Out    HPV IMMUNIZATION  Aged Out    MENINGITIS IMMUNIZATION  Aged Out     Lab work is in process      ROS:  Constitutional, HEENT, cardiovascular, pulmonary, GI, , musculoskeletal, neuro, skin, endocrine and psych systems are negative, except as otherwise noted.    OBJECTIVE:   /88   Pulse 54   Ht 1.861 m (6' 1.25\")   Wt 106 kg (233 lb 9.6 oz)   SpO2 99%   BMI 30.61 kg/m     Estimated body mass index is 30.61 kg/m  as calculated from the following:    Height as of this encounter: 1.861 m (6' 1.25\").    Weight as of this encounter: 106 kg (233 lb 9.6 oz).  EXAM:   GENERAL: healthy, alert and no distress  EYES: Eyes grossly normal to inspection, PERRL and conjunctivae and sclerae normal  HENT: ear canals and TM's normal, nose and mouth without ulcers or lesions  NECK: no adenopathy, no asymmetry, masses, or scars and thyroid normal to palpation  RESP: lungs clear to auscultation - no rales, rhonchi or wheezes  CV: regular rate and rhythm, normal S1 S2, no S3 or S4, no murmur, click or rub, no peripheral edema and peripheral pulses strong  ABDOMEN: soft, nontender, no hepatosplenomegaly, no masses and bowel sounds normal  MS: no gross musculoskeletal defects noted, no edema  SKIN: no suspicious lesions or rashes  NEURO: Normal strength and tone, mentation intact and speech normal  PSYCH: mentation appears normal, affect normal/bright  LYMPH: no cervical, supraclavicular, axillary, or inguinal adenopathy    Diagnostic Test Results:  Labs reviewed in " Epic    ASSESSMENT / PLAN:   Peter was seen today for physical, hearing screening and imm/inj.    Diagnoses and all orders for this visit:    Encounter for Medicare annual wellness exam  -     VENOUS COLLECTION  -     CBC with Diff/Plt ([facility])  -     Comprehensive metabolic panel; Future  -     Lipid Profile ([facility])  -     Prostate Specific Antigen Screen; Future    Stage 2 chronic kidney disease (H) improved from ckd 3 in past  Chronic kidney disease due to HTN and DM.  Check urine for protein.   Patient is on ACE/ARB.  Patient is on a statin.  Told the patient to avoid NSAIDs if at all possible.   Will monitor closely and send to Nephrologist if renal function continues to decline.    -     Microalbumin ([facility])    Mild intermittent asthma without complication  Discussed asthma in detail and discussed how their breathing symptoms and the pattern of the shortness of breath fits with asthma.   Discussed pathophysiology of asthma and treatments based on the degree of symptoms.   Discussed triggers for asthma in general, and those specific to the patient.  Also told them to anticipate potentially more intense coughing and shortness of breath with any URI (regardless of bacterial or viral etiology) and to be prepared to use the albuterol more often if needed and to return and see me for any such exacerbation.    I recommended having rescue inhaler available and using all throughout the year as needed, demonstrated proper MDI technique for them.  Emphasized the need for them to let me know if there are any changes for the worse in their breathing related to asthma, either acute or chronic and to seek emergency care if the breathing acutely worsens in a sever manner.      Benign essential hypertension  Reviewed his current HTN management. Discussed our goal for him is a systolic pressure at or below 128 and diastolic pressure at or below 83.  We today managed his prescriptions with refills ensured to ensure availabilty of  current medications.  Discussed the importance for aggressive management of HTN to prevent vascular complications later.  Recommended lower fat, lower carbohydrate, and lower sodium (<2000 mg)diet. Required intervals for follow up on HTN, lab studies reviewed.    Strongly recommened he follow his blood pressures outside the clinic to ensure that BPs are remaining within guidelines,.  Instructed to contact me if the readings are not within guidelines on a regular basis so we can adjust treatment as needed.    -     Aldosterone; Future  -     Renin activity; Future  -     Aldosterone Renin Ratio; Future  -     Potassium; Future  -     lisinopril (ZESTRIL) 10 MG tablet; Take 1 tablet (10 mg) by mouth every morning  -     amLODIPine (NORVASC) 5 MG tablet; Take 1 tablet (5 mg) by mouth daily  -     hydrochlorothiazide (MICROZIDE) 12.5 MG capsule; Take 1 capsule (12.5 mg) by mouth daily    Hypercholesteremia  Discussed current lipid results, previous results (if available) current guidelines (NCEP) for treatment and goals for lipids.  Discussed lifestyle modification, dietary changes (low fat, low simple carb) and regular aerobic exercise.  Discussed the link between dysmetabolic syndrome and impaired glucose tolerance seen in certain patterns of lipids.  Briefly discussed medication used for lipid lowering, including the statins are their possible side effects of myalgias, rhabdomyolysis, and liver toxicity.    -     rosuvastatin (CRESTOR) 20 MG tablet; Take 1 tablet (20 mg) by mouth daily    Coronary artery disease involving native coronary artery of native heart without angina pectoris  Will refill-     nitroGLYcerin (NITROSTAT) 0.4 MG sublingual tablet; For chest pain place 1 tablet under the tongue every 5 minutes for 3 doses. If symptoms persist 5 minutes after 1st dose call 911.  And he will continue to be actively managed by Dr. Tovar    Encounter for medication refill  -     budesonide-formoterol (SYMBICORT)  "80-4.5 MCG/ACT Inhaler; Inhale 2 puffs into the lungs two times daily      Atherosclerosis of aorta (H24)  On statin and asa to manage        Patient has been advised of split billing requirements and indicates understanding: Yes    COUNSELING:  Reviewed preventive health counseling, as reflected in patient instructions       Regular exercise       Healthy diet/nutrition    Estimated body mass index is 30.61 kg/m  as calculated from the following:    Height as of this encounter: 1.861 m (6' 1.25\").    Weight as of this encounter: 106 kg (233 lb 9.6 oz).    Weight management plan: Discussed healthy diet and exercise guidelines    He reports that he has never smoked. He has never used smokeless tobacco.    I have reviewed Opioid Use Disorder and Substance Use Disorder risk factors and made any needed referrals.   Appropriate preventive services were discussed with this patient, including applicable screening as appropriate for cardiovascular disease, diabetes, osteopenia/osteoporosis, and glaucoma.  As appropriate for age/gender, discussed screening for colorectal cancer, prostate cancer, breast cancer, and cervical cancer. Checklist reviewing preventive services available has been given to the patient.    Reviewed patients plan of care and provided an AVS. The Basic Care Plan (routine screening as documented in Health Maintenance) for Reggie meets the Care Plan requirement. This Care Plan has been established and reviewed with the Patient.    Counseling Resources:  ATP IV Guidelines  Pooled Cohorts Equation Calculator  Breast Cancer Risk Calculator  BRCA-Related Cancer Risk Assessment: FHS-7 Tool  FRAX Risk Assessment  ICSI Preventive Guidelines  Dietary Guidelines for Americans, 2010  USDA's MyPlate  ASA Prophylaxis  Lung CA Screening    Willie Nam MD  [facility]  "

## 2023-10-06 NOTE — PATIENT INSTRUCTIONS
Patient Education   Personalized Prevention Plan  You are due for the preventive services outlined below.  Your care team is available to assist you in scheduling these services.  If you have already completed any of these items, please share that information with your care team to update in your medical record.  Health Maintenance Due   Topic Date Due    Kidney Microalbumin Urine Test  02/04/2023    COVID-19 Vaccine (6 - 2023-24 season) 09/01/2023    Hemoglobin  10/03/2023          Hypertension   What is hypertension?   Hypertension is blood pressure that keeps being higher than normal. Blood pressure is the force of blood against artery walls as the heart pumps blood through the body. Blood pressure can be unhealthy if it is above 120/80. The higher your blood pressure, the greater the health risk.   High blood pressure can be controlled if you take these steps:   Maintain a healthy weight.   Are physically active.   Follow a healthy eating plan, which includes foods that do not have a lot of salt and sodium.   Do not drink a lot of alcohol.   Our goal is to keep the systolic (top) number 138 or lower and the diastolic (bottom) number 83 or lower

## 2023-10-06 NOTE — PROGRESS NOTES
SUBJECTIVE:   Reuben is a 73 year old who presents for Preventive Visit.    Are you in the first 12 months of your Medicare coverage?  { :233022}    HPI        Have you ever done Advance Care Planning? (For example, a Health Directive, POLST, or a discussion with a medical provider or your loved ones about your wishes): { :398476}    {Hearing Test Done (Optional):833364}   Fall risk  { :325447}  {If any of the above assessments are answered yes, consider ordering appropriate referrals (Optional):527708}  Cognitive Screening { :866179}    {Do you have sleep apnea, excessive snoring or daytime drowsiness? (Optional):426375}    Reviewed and updated as needed this visit by clinical staff                  Reviewed and updated as needed this visit by Provider                 Social History     Tobacco Use     Smoking status: Never     Smokeless tobacco: Never   Substance Use Topics     Alcohol use: Yes     Alcohol/week: 0.8 standard drinks of alcohol     Types: 1 Standard drinks or equivalent per week     Comment: occasionally     {Rooming staff  Click this link to complete the Prescreen if response below is not for today's visit  Alcohol Use Prescreen >3 drinks/day or > 7 drinks/week.  If the prescreen question answer is YES, complete the full AUDIT  :505954}         No data to display              {add AUDIT responses (Optional) (A score of 7 for adult men is an indication of hazardous drinking; a score of 8 or more is an indication of an alcohol use disorder.  A score of 7 or more for adult women is an indication of hazardous drinking or an alchohol use disorder):420898}  Do you have a current opioid prescription? { :992041}  Do you use any other controlled substances or medications that are not prescribed by a provider? {Substance Use :010480}  {Provider  If there are gaps in the social history shown above, please follow the link and refresh the note Link to Social and Substance History :883989}    {Outside tests to  "abstract? :995489}    {additional problems to add (Optional):531848}    Current providers sharing in care for this patient include: {Rooming staff:  Please update Care Team from storyboard, refresh this note and then delete this statement}  Patient Care Team:  Willie Nam MD as PCP - General (Family Practice)  Braulio Blanca MD as MD (Cardiology)  Willie Nam MD as Assigned PCP  Braulio Blanca MD as Assigned Heart and Vascular Provider    The following health maintenance items are reviewed in Epic and correct as of today:  Health Maintenance   Topic Date Due     PHQ-2 (once per calendar year)  01/01/2023     MICROALBUMIN  02/04/2023     COVID-19 Vaccine (5 - 2023-24 season) 09/01/2023     FALL RISK ASSESSMENT  10/03/2023     ASTHMA ACTION PLAN  10/03/2023     HEMOGLOBIN  10/03/2023     DTAP/TDAP/TD IMMUNIZATION (2 - Td or Tdap) 10/24/2023     BMP  11/21/2023     LIPID  11/21/2023     ASTHMA CONTROL TEST  02/29/2024     MEDICARE ANNUAL WELLNESS VISIT  10/09/2024     ADVANCE CARE PLANNING  04/28/2028     COLORECTAL CANCER SCREENING  11/01/2031     HEPATITIS C SCREENING  Completed     INFLUENZA VACCINE  Completed     Pneumococcal Vaccine: 65+ Years  Completed     URINALYSIS  Completed     ZOSTER IMMUNIZATION  Completed     AORTIC ANEURYSM SCREENING (SYSTEM ASSIGNED)  Completed     IPV IMMUNIZATION  Aged Out     HPV IMMUNIZATION  Aged Out     MENINGITIS IMMUNIZATION  Aged Out     {Chronicprobdata (optional):539956}  {Decision Support (Optional):808437}        Review of Systems  {ROS COMP (Optional):632244}    OBJECTIVE:   There were no vitals taken for this visit. Estimated body mass index is 29.55 kg/m  as calculated from the following:    Height as of 8/31/23: 1.854 m (6' 1\").    Weight as of 8/31/23: 101.6 kg (224 lb).  Physical Exam  {Exam (Optional) :752365}    {Diagnostic Test Results (Optional):660883}    ASSESSMENT / PLAN:   {Diag Picklist:146766}    {Patient advised " of split billing (Optional):436198}      COUNSELING:  {Medicare Counselin}        He reports that he has never smoked. He has never used smokeless tobacco.      Appropriate preventive services were discussed with this patient, including applicable screening as appropriate for fall prevention, nutrition, physical activity, Tobacco-use cessation, weight loss and cognition.  Checklist reviewing preventive services available has been given to the patient.    Reviewed patients plan of care and provided an AVS. The {CarePlan:423922} for Peter meets the Care Plan requirement. This Care Plan has been established and reviewed with the {PATIENT, FAMILY MEMBER, CAREGIVER:474247}.    {Counseling Resources  [place] Preventive Services Task Force  Cholesterol Screening  Health diet/nutrition  Pooled Cohorts Equation Calculator  [employer]'s MyPlate  ASA Prophylaxis  Lung CA Screening  Osteoporosis prevention/bone health :616591}  {Prostate Cancer Screening  Consider for men 55-69 per guidance from USPSTF :587813}    Willie Nam MD  [facility]    Identified Health Risks:  {Medicare required documentation of substance and opioid use disorders screening :627329}

## 2023-10-09 ENCOUNTER — OFFICE VISIT (OUTPATIENT)
Dept: FAMILY MEDICINE | Facility: CLINIC | Age: 73
End: 2023-10-09

## 2023-10-09 VITALS
SYSTOLIC BLOOD PRESSURE: 138 MMHG | WEIGHT: 233.6 LBS | OXYGEN SATURATION: 99 % | HEIGHT: 73 IN | DIASTOLIC BLOOD PRESSURE: 88 MMHG | HEART RATE: 54 BPM | BODY MASS INDEX: 30.96 KG/M2

## 2023-10-09 DIAGNOSIS — E78.00 HYPERCHOLESTEREMIA: ICD-10-CM

## 2023-10-09 DIAGNOSIS — N18.2 STAGE 2 CHRONIC KIDNEY DISEASE: ICD-10-CM

## 2023-10-09 DIAGNOSIS — I10 BENIGN ESSENTIAL HYPERTENSION: ICD-10-CM

## 2023-10-09 DIAGNOSIS — I70.0 ATHEROSCLEROSIS OF AORTA (H): ICD-10-CM

## 2023-10-09 DIAGNOSIS — I25.10 CORONARY ARTERY DISEASE INVOLVING NATIVE CORONARY ARTERY OF NATIVE HEART WITHOUT ANGINA PECTORIS: ICD-10-CM

## 2023-10-09 DIAGNOSIS — J45.20 MILD INTERMITTENT ASTHMA WITHOUT COMPLICATION: ICD-10-CM

## 2023-10-09 DIAGNOSIS — Z00.00 ENCOUNTER FOR MEDICARE ANNUAL WELLNESS EXAM: Primary | ICD-10-CM

## 2023-10-09 LAB
% GRANULOCYTES: 67.9 % (ref 42.2–75.2)
ALBUMIN SERPL BCG-MCNC: 4.4 G/DL (ref 3.5–5.2)
ALP SERPL-CCNC: 64 U/L (ref 40–129)
ALT SERPL W P-5'-P-CCNC: 29 U/L (ref 0–70)
ANION GAP SERPL CALCULATED.3IONS-SCNC: 12 MMOL/L (ref 7–15)
AST SERPL W P-5'-P-CCNC: 23 U/L (ref 0–45)
BILIRUB SERPL-MCNC: 0.3 MG/DL
BUN SERPL-MCNC: 19.8 MG/DL (ref 8–23)
CALCIUM SERPL-MCNC: 9.3 MG/DL (ref 8.8–10.2)
CHLORIDE SERPL-SCNC: 103 MMOL/L (ref 98–107)
CHOLESTEROL: 127 MG/DL (ref 100–199)
CREAT SERPL-MCNC: 1.03 MG/DL (ref 0.67–1.17)
DEPRECATED HCO3 PLAS-SCNC: 25 MMOL/L (ref 22–29)
EGFRCR SERPLBLD CKD-EPI 2021: 78 ML/MIN/1.73M2
FASTING?: YES
GLUCOSE SERPL-MCNC: 104 MG/DL (ref 70–99)
HCT VFR BLD AUTO: 42.2 % (ref 39–51)
HDL (RMG): 50 MG/DL (ref 40–?)
HEMOGLOBIN: 13.9 G/DL (ref 13.4–17.5)
LDL CALCULATED (RMG): 61 MG/DL (ref 0–130)
LYMPHOCYTES NFR BLD AUTO: 24.7 % (ref 20.5–51.1)
MCH RBC QN AUTO: 30.3 PG (ref 27–31)
MCHC RBC AUTO-ENTMCNC: 33 G/DL (ref 33–37)
MCV RBC AUTO: 91.5 FL (ref 80–100)
MONOCYTES NFR BLD AUTO: 7.4 % (ref 1.7–9.3)
PLATELET # BLD AUTO: 181 K/UL (ref 140–450)
POTASSIUM SERPL-SCNC: 4.3 MMOL/L (ref 3.4–5.3)
PROT SERPL-MCNC: 7 G/DL (ref 6.4–8.3)
PSA SERPL DL<=0.01 NG/ML-MCNC: 0.7 NG/ML (ref 0–6.5)
RBC # BLD AUTO: 4.61 X10/CMM (ref 4.2–5.9)
SODIUM SERPL-SCNC: 140 MMOL/L (ref 135–145)
TRIGLYCERIDES (RMG): 85 MG/DL (ref 0–149)
WBC # BLD AUTO: 7.2 X10/CMM (ref 3.8–11)

## 2023-10-09 PROCEDURE — 82044 UR ALBUMIN SEMIQUANTITATIVE: CPT | Performed by: FAMILY MEDICINE

## 2023-10-09 PROCEDURE — 99214 OFFICE O/P EST MOD 30 MIN: CPT | Mod: 25 | Performed by: FAMILY MEDICINE

## 2023-10-09 PROCEDURE — G0103 PSA SCREENING: HCPCS | Performed by: FAMILY MEDICINE

## 2023-10-09 PROCEDURE — 36415 COLL VENOUS BLD VENIPUNCTURE: CPT | Performed by: FAMILY MEDICINE

## 2023-10-09 PROCEDURE — 80053 COMPREHEN METABOLIC PANEL: CPT | Performed by: FAMILY MEDICINE

## 2023-10-09 PROCEDURE — 82088 ASSAY OF ALDOSTERONE: CPT | Performed by: FAMILY MEDICINE

## 2023-10-09 PROCEDURE — 85025 COMPLETE CBC W/AUTO DIFF WBC: CPT | Performed by: FAMILY MEDICINE

## 2023-10-09 PROCEDURE — 84244 ASSAY OF RENIN: CPT | Performed by: FAMILY MEDICINE

## 2023-10-09 PROCEDURE — 80061 LIPID PANEL: CPT | Mod: QW | Performed by: FAMILY MEDICINE

## 2023-10-09 PROCEDURE — G0439 PPPS, SUBSEQ VISIT: HCPCS | Performed by: FAMILY MEDICINE

## 2023-10-09 RX ORDER — ROSUVASTATIN CALCIUM 20 MG/1
20 TABLET, COATED ORAL DAILY
Qty: 90 TABLET | Refills: 3 | Status: SHIPPED | OUTPATIENT
Start: 2023-10-09

## 2023-10-09 RX ORDER — AMLODIPINE BESYLATE 5 MG/1
5 TABLET ORAL DAILY
Qty: 90 TABLET | Refills: 3 | Status: SHIPPED | OUTPATIENT
Start: 2023-10-09 | End: 2024-06-11

## 2023-10-09 RX ORDER — LISINOPRIL 10 MG/1
10 TABLET ORAL EVERY MORNING
Qty: 90 TABLET | Refills: 3 | Status: SHIPPED | OUTPATIENT
Start: 2023-10-09 | End: 2023-12-05

## 2023-10-09 RX ORDER — BUDESONIDE AND FORMOTEROL FUMARATE DIHYDRATE 80; 4.5 UG/1; UG/1
2 AEROSOL RESPIRATORY (INHALATION)
Qty: 10.2 G | Refills: 5 | Status: SHIPPED | OUTPATIENT
Start: 2023-10-09 | End: 2024-02-26

## 2023-10-09 RX ORDER — NITROGLYCERIN 0.4 MG/1
TABLET SUBLINGUAL
Qty: 25 TABLET | Refills: 3 | Status: SHIPPED | OUTPATIENT
Start: 2023-10-09

## 2023-10-09 RX ORDER — HYDROCHLOROTHIAZIDE 12.5 MG/1
12.5 CAPSULE ORAL DAILY
Qty: 90 CAPSULE | Refills: 3 | Status: SHIPPED | OUTPATIENT
Start: 2023-10-09 | End: 2023-12-05

## 2023-10-10 LAB — ALDOST SERPL-MCNC: 6 NG/DL (ref 0–31)

## 2023-10-11 LAB
A/C RATIO (RMG): <30
ALBUMIN- RMG: 10 (ref 0–10)
INTERPRETATION: NORMAL
URINE CREATININE - RMG: 100 (ref 0–300)

## 2023-10-11 NOTE — RESULT ENCOUNTER NOTE
Dear Peter,     I am writing to report that your included test results are as expected.    Many labs contain some results that are slightly outside of the normal range, I have reviewed any of these results and they require no changes at this time.    Willie Nam MD

## 2023-10-13 LAB — RENIN PLAS-CCNC: 2 NG/ML/HR

## 2023-10-15 LAB — ALDOST/RENIN PLAS-RTO: 3 {RATIO} (ref 0–25)

## 2023-10-27 DIAGNOSIS — B37.2 YEAST INFECTION OF THE SKIN: ICD-10-CM

## 2023-10-27 NOTE — TELEPHONE ENCOUNTER
Med: Ketoconazole 2%    LOV (related): 10/9/23      Due for F/U around: Return in about 53 weeks (around 10/14/2024) for Annual Wellness Visit.     Next Appt: None

## 2023-10-28 RX ORDER — KETOCONAZOLE 20 MG/G
CREAM TOPICAL
Qty: 60 G | Refills: 0 | Status: SHIPPED | OUTPATIENT
Start: 2023-10-28 | End: 2024-05-13

## 2023-11-02 DIAGNOSIS — M79.672 LEFT FOOT PAIN: ICD-10-CM

## 2023-11-02 RX ORDER — NAPROXEN 500 MG/1
TABLET ORAL
Qty: 180 TABLET | Refills: 3 | Status: SHIPPED | OUTPATIENT
Start: 2023-11-02 | End: 2024-09-09

## 2023-11-29 DIAGNOSIS — E78.00 HYPERCHOLESTEREMIA: ICD-10-CM

## 2023-11-29 DIAGNOSIS — I10 ESSENTIAL HYPERTENSION: ICD-10-CM

## 2023-11-29 DIAGNOSIS — I25.10 CORONARY ARTERY DISEASE INVOLVING NATIVE CORONARY ARTERY OF NATIVE HEART WITHOUT ANGINA PECTORIS: Primary | ICD-10-CM

## 2023-11-29 DIAGNOSIS — N28.9 RENAL INSUFFICIENCY: ICD-10-CM

## 2023-11-29 DIAGNOSIS — E78.5 HYPERLIPIDEMIA WITH TARGET LDL LESS THAN 70: ICD-10-CM

## 2023-12-04 ENCOUNTER — LAB (OUTPATIENT)
Dept: LAB | Facility: CLINIC | Age: 73
End: 2023-12-04
Payer: COMMERCIAL

## 2023-12-04 DIAGNOSIS — I10 ESSENTIAL HYPERTENSION: ICD-10-CM

## 2023-12-04 DIAGNOSIS — E78.5 HYPERLIPIDEMIA WITH TARGET LDL LESS THAN 70: ICD-10-CM

## 2023-12-04 DIAGNOSIS — I25.10 CORONARY ARTERY DISEASE INVOLVING NATIVE CORONARY ARTERY OF NATIVE HEART WITHOUT ANGINA PECTORIS: ICD-10-CM

## 2023-12-04 DIAGNOSIS — E78.00 HYPERCHOLESTEREMIA: ICD-10-CM

## 2023-12-04 DIAGNOSIS — N28.9 RENAL INSUFFICIENCY: ICD-10-CM

## 2023-12-04 LAB
ALT SERPL W P-5'-P-CCNC: 25 U/L (ref 0–70)
ANION GAP SERPL CALCULATED.3IONS-SCNC: 11 MMOL/L (ref 7–15)
BUN SERPL-MCNC: 19.5 MG/DL (ref 8–23)
CALCIUM SERPL-MCNC: 9.4 MG/DL (ref 8.8–10.2)
CHLORIDE SERPL-SCNC: 102 MMOL/L (ref 98–107)
CHOLEST SERPL-MCNC: 149 MG/DL
CREAT SERPL-MCNC: 1.03 MG/DL (ref 0.67–1.17)
DEPRECATED HCO3 PLAS-SCNC: 27 MMOL/L (ref 22–29)
EGFRCR SERPLBLD CKD-EPI 2021: 78 ML/MIN/1.73M2
GLUCOSE SERPL-MCNC: 100 MG/DL (ref 70–99)
HDLC SERPL-MCNC: 59 MG/DL
LDLC SERPL CALC-MCNC: 73 MG/DL
NONHDLC SERPL-MCNC: 90 MG/DL
POTASSIUM SERPL-SCNC: 4.3 MMOL/L (ref 3.4–5.3)
SODIUM SERPL-SCNC: 140 MMOL/L (ref 135–145)
TRIGL SERPL-MCNC: 85 MG/DL

## 2023-12-04 PROCEDURE — 80048 BASIC METABOLIC PNL TOTAL CA: CPT | Performed by: INTERNAL MEDICINE

## 2023-12-04 PROCEDURE — 84460 ALANINE AMINO (ALT) (SGPT): CPT | Performed by: INTERNAL MEDICINE

## 2023-12-04 PROCEDURE — 80061 LIPID PANEL: CPT | Performed by: INTERNAL MEDICINE

## 2023-12-04 PROCEDURE — 36415 COLL VENOUS BLD VENIPUNCTURE: CPT | Performed by: INTERNAL MEDICINE

## 2023-12-05 ENCOUNTER — OFFICE VISIT (OUTPATIENT)
Dept: CARDIOLOGY | Facility: CLINIC | Age: 73
End: 2023-12-05
Payer: COMMERCIAL

## 2023-12-05 VITALS
DIASTOLIC BLOOD PRESSURE: 82 MMHG | HEART RATE: 58 BPM | WEIGHT: 224.1 LBS | OXYGEN SATURATION: 98 % | BODY MASS INDEX: 29.7 KG/M2 | SYSTOLIC BLOOD PRESSURE: 152 MMHG | HEIGHT: 73 IN

## 2023-12-05 DIAGNOSIS — I25.10 CORONARY ARTERY DISEASE INVOLVING NATIVE CORONARY ARTERY OF NATIVE HEART WITHOUT ANGINA PECTORIS: ICD-10-CM

## 2023-12-05 DIAGNOSIS — E78.5 HYPERLIPIDEMIA WITH TARGET LDL LESS THAN 70: ICD-10-CM

## 2023-12-05 DIAGNOSIS — I10 BENIGN ESSENTIAL HYPERTENSION: ICD-10-CM

## 2023-12-05 DIAGNOSIS — E78.00 HYPERCHOLESTEREMIA: ICD-10-CM

## 2023-12-05 DIAGNOSIS — I10 ESSENTIAL HYPERTENSION: Primary | ICD-10-CM

## 2023-12-05 PROCEDURE — 99214 OFFICE O/P EST MOD 30 MIN: CPT | Performed by: INTERNAL MEDICINE

## 2023-12-05 RX ORDER — LISINOPRIL 20 MG/1
20 TABLET ORAL EVERY MORNING
Qty: 90 TABLET | Refills: 3 | Status: SHIPPED | OUTPATIENT
Start: 2023-12-05

## 2023-12-05 RX ORDER — HYDROCHLOROTHIAZIDE 25 MG/1
25 TABLET ORAL DAILY
Qty: 90 TABLET | Refills: 3 | Status: SHIPPED | OUTPATIENT
Start: 2023-12-05

## 2023-12-05 NOTE — PROGRESS NOTES
HPI and Plan:   It is my pleasure to see your patient Peter Raman in follow-up.  This is a patient with a history of coronary artery disease with prior stenting of the right coronary artery.  This patient also is a history of hyperlipidemia and hypertension.    Since I last saw the patient he has been feeling well with no chest pain chest pressure or unusual shortness of breath.  However his blood pressure has risen and is now 152/82 also the patient's lipid profile is not as good as it has been previously.  His LDL cholesterol is now 73.  2 months ago it was 61.  His HDL is normal at 59 as are his triglycerides at 85.  His liver function tests are normal with an ALT of 25.  He tells me that he has not been exercising as much as he has in the past.  He has now rejoined a gym which is 2 blocks from him.    Impression:  1.  Coronary artery disease.  The patient is asymptomatic with respect to coronary artery disease with no symptoms suggestive angina pectoris  2.  Essential hypertension.  His blood pressure is not well controlled.  This in part may be due to the fact that he is overweight and that he is not exercising as much as he did in the past.  3.  Lipid profile is not at goal with his LDL cholesterol significantly higher than previously.  LDL goal is less than 70 at this patient does follow-up with an secondary prevention guidelines.  4.  Normal basic metabolic profile on hydrochlorothiazide and lisinopril.    Plan:  1.  I would increase the dose of hydrochlorothiazide to 25 mg and the dose of lisinopril to 20 mg.  I will have the patient return in 2 weeks time and I will have a blood pressure check at that time and a basic metabolic profile to ensure that his electrolytes and renal function have not been impacted by the increased dose of hydrochlorothiazide and lisinopril.  2.  I encouraged the patient to avoid salt, adhere to a more Mediterranean style diet and to exercise as much as he can.  In 3 months  time we will recheck his lipids with ALT.  3.  I will see the patient back again in 1 years time and we will repeat his lipids with liver function tests and basic metabolic profile at that time.  As always the patient is been told to contact me with any questions or any concerns.    Braulio Aparicio MD, FACC, FRCPI    Orders Placed This Encounter   Procedures    Lipid Profile    ALT    Basic metabolic panel    Basic metabolic panel    Lipid Profile    ALT    Follow-Up with Cardiology Nurse    Follow-Up with Cardiology       Orders Placed This Encounter   Medications    lisinopril (ZESTRIL) 20 MG tablet     Sig: Take 1 tablet (20 mg) by mouth every morning     Dispense:  90 tablet     Refill:  3    hydrochlorothiazide (HYDRODIURIL) 25 MG tablet     Sig: Take 1 tablet (25 mg) by mouth daily     Dispense:  90 tablet     Refill:  3       Medications Discontinued During This Encounter   Medication Reason    hydrochlorothiazide (MICROZIDE) 12.5 MG capsule     lisinopril (ZESTRIL) 10 MG tablet Reorder (No AVS)         Encounter Diagnoses   Name Primary?    Essential hypertension Yes    Coronary artery disease involving native coronary artery of native heart without angina pectoris     Hyperlipidemia with target LDL less than 70     Hypercholesteremia     Benign essential hypertension        CURRENT MEDICATIONS:  Current Outpatient Medications   Medication Sig Dispense Refill    albuterol (VENTOLIN HFA) 108 (90 Base) MCG/ACT inhaler Inhale 2 puffs into the lungs every 6 hours as needed for shortness of breath / dyspnea or wheezing 3 Inhaler 3    amLODIPine (NORVASC) 5 MG tablet Take 1 tablet (5 mg) by mouth daily 90 tablet 3    amoxicillin (AMOXIL) 500 MG capsule TAKE 4 CAPSULES BY MOUTH 1 HOUR PRIOR TO DENTAL APPOINTMENT 4 capsule 4    Ascorbic Acid (VITAMIN C) 500 MG CAPS Take 500 mg by mouth 2 times daily (with meals)       aspirin (ASA) 81 MG EC tablet Take 81 mg by mouth daily      budesonide-formoterol (SYMBICORT)  80-4.5 MCG/ACT Inhaler Inhale 2 puffs into the lungs two times daily 10.2 g 5    hydrochlorothiazide (HYDRODIURIL) 25 MG tablet Take 1 tablet (25 mg) by mouth daily 90 tablet 3    ketoconazole (NIZORAL) 2 % external cream APPLY TO AFFECTED AREA TOPICALLY EVERY DAY 60 g 0    lisinopril (ZESTRIL) 20 MG tablet Take 1 tablet (20 mg) by mouth every morning 90 tablet 3    Multiple Vitamin (DAILY MULTIVITAMIN PO) Take 2 capsules by mouth every morning      Naphazoline-Pheniramine (OPCON-A OP) Place 1 drop into both eyes daily as needed (cat allergies)      naproxen (NAPROSYN) 500 MG tablet TAKE 1 TABLET BY MOUTH TWICE A DAY WITH MEALS 180 tablet 3    nitroGLYcerin (NITROSTAT) 0.4 MG sublingual tablet For chest pain place 1 tablet under the tongue every 5 minutes for 3 doses. If symptoms persist 5 minutes after 1st dose call 911. 25 tablet 3    Probiotic Product (PROBIOTIC-10 PO) Take 1 capsule by mouth every morning       Psyllium (METAMUCIL FIBER PO) Take 1-2 teaspoonful by mouth daily as needed (mix with a glass of water and drink)      rosuvastatin (CRESTOR) 20 MG tablet Take 1 tablet (20 mg) by mouth daily 90 tablet 3    senna-docusate (SENOKOT-S/PERICOLACE) 8.6-50 MG tablet Take 1-2 tablets by mouth 2 times daily Take while on oral narcotics to prevent or treat constipation. 30 tablet 0    Vitamin D, Cholecalciferol, 1000 units CAPS Take 3,000 Units by mouth every morning       zinc gluconate 50 MG tablet Take 50 mg by mouth every morning          ALLERGIES     Allergies   Allergen Reactions    Cats     Simvastatin Muscle Pain (Myalgia)     achey       PAST MEDICAL HISTORY:  Past Medical History:   Diagnosis Date    ACP (advance care planning)     will bring copy in     Arthritis     Bruit     CAD (coronary artery disease) 2002    a stent    CKD (chronic kidney disease)     Esophageal reflux 5/17/2013    Family history of ischemic heart disease     Hyperlipidaemia     Hypertension        PAST SURGICAL HISTORY:  Past  "Surgical History:   Procedure Laterality Date    ARTHROPLASTY HIP Right 2/23/2021    Procedure: RIGHT TOTAL HIP ARTHROPLASTY;  Surgeon: Gera Davison MD;  Location: [facility] OR    CYSTECTOMY PILONIDAL      HEART CATH, ANGIOPLASTY  10/4/02    3.0 X 8 mm Velocity stent    ROTATOR CUFF REPAIR RT/LT  2009    Dr. Butler       FAMILY HISTORY:  Family History   Problem Relation Age of Onset    Cerebrovascular Disease Mother     C.A.D. Father     Cancer Father         bone    Coronary Artery Disease Brother     Heart Disease Brother     No Known Problems Brother     No Known Problems Sister     No Known Problems Sister        SOCIAL HISTORY:  Social History     Socioeconomic History    Marital status:      Spouse name: None    Number of children: None    Years of education: None    Highest education level: None   Occupational History    Occupation: Finance   Tobacco Use    Smoking status: Never    Smokeless tobacco: Never   Substance and Sexual Activity    Alcohol use: Yes     Alcohol/week: 0.8 standard drinks of alcohol     Types: 1 Standard drinks or equivalent per week     Comment: occasionally    Drug use: No    Sexual activity: Yes     Partners: Female   Other Topics Concern    Parent/sibling w/ CABG, MI or angioplasty before 65F 55M? No    Sleep Concern Yes     Comment: occ    Stress Concern No    Exercise Yes     Comment: walks daily       Review of Systems:  Skin:          Eyes:         ENT:         Respiratory:          Cardiovascular:         Gastroenterology:        Genitourinary:         Musculoskeletal:         Neurologic:         Psychiatric:         Heme/Lymph/Imm:         Endocrine:           Physical Exam:  Vitals: BP (!) 152/82   Pulse 58   Ht 1.861 m (6' 1.25\")   Wt 101.7 kg (224 lb 1.6 oz)   SpO2 98%   BMI 29.36 kg/m      Constitutional:  cooperative, alert and oriented, well developed, well nourished, in no acute distress overweight      Skin:  warm and dry to the touch, no apparent skin " lesions or masses noted          Head:  normocephalic, no masses or lesions        Eyes:  pupils equal and round, conjunctivae and lids unremarkable, sclera white, no xanthalasma, EOMS intact, no nystagmus        Lymph:No Cervical lymphadenopathy present     ENT:  no pallor or cyanosis, dentition good        Neck:  carotid pulses are full and equal bilaterally, JVP normal, no carotid bruit        Respiratory:  normal breath sounds, clear to auscultation, normal A-P diameter, normal symmetry, normal respiratory excursion, no use of accessory muscles         Cardiac: regular rhythm;normal S1 and S2;apical impulse not displaced;no murmurs, gallops or rubs detected   S4            pulses full and equal, no bruits auscultated                                        GI:  not assessed this visit        Extremities and Muscular Skeletal:  no deformities, clubbing, cyanosis, erythema observed;no edema              Neurological:  no gross motor deficits;affect appropriate        Psych:  Alert and Oriented x 3        CC  No referring provider defined for this encounter.

## 2023-12-05 NOTE — LETTER
12/5/2023    Willie Nam MD  [address]  [facility] [id number]    RE: Peter Hernandez BELLA       Dear Colleague,     I had the pleasure of seeing Peter Hernandez II in the [facility].  HPI and Plan:   It is my pleasure to see your patient Peter Raman in follow-up.  This is a patient with a history of coronary artery disease with prior stenting of the right coronary artery.  This patient also is a history of hyperlipidemia and hypertension.    Since I last saw the patient he has been feeling well with no chest pain chest pressure or unusual shortness of breath.  However his blood pressure has risen and is now 152/82 also the patient's lipid profile is not as good as it has been previously.  His LDL cholesterol is now 73.  2 months ago it was 61.  His HDL is normal at 59 as are his triglycerides at 85.  His liver function tests are normal with an ALT of 25.  He tells me that he has not been exercising as much as he has in the past.  He has now rejoined a gym which is 2 blocks from him.    Impression:  1.  Coronary artery disease.  The patient is asymptomatic with respect to coronary artery disease with no symptoms suggestive angina pectoris  2.  Essential hypertension.  His blood pressure is not well controlled.  This in part may be due to the fact that he is overweight and that he is not exercising as much as he did in the past.  3.  Lipid profile is not at goal with his LDL cholesterol significantly higher than previously.  LDL goal is less than 70 at this patient does follow-up with an secondary prevention guidelines.  4.  Normal basic metabolic profile on hydrochlorothiazide and lisinopril.    Plan:  1.  I would increase the dose of hydrochlorothiazide to 25 mg and the dose of lisinopril to 20 mg.  I will have the patient return in 2 weeks time and I will have a blood pressure check at that time and a basic metabolic profile to ensure that his electrolytes and renal  function have not been impacted by the increased dose of hydrochlorothiazide and lisinopril.  2.  I encouraged the patient to avoid salt, adhere to a more Mediterranean style diet and to exercise as much as he can.  In 3 months time we will recheck his lipids with ALT.  3.  I will see the patient back again in 1 years time and we will repeat his lipids with liver function tests and basic metabolic profile at that time.  As always the patient is been told to contact me with any questions or any concerns.    Braulio Aparicio MD, FACC, FRCPI    Orders Placed This Encounter   Procedures    Lipid Profile    ALT    Basic metabolic panel    Basic metabolic panel    Lipid Profile    ALT    Follow-Up with Cardiology Nurse    Follow-Up with Cardiology       Orders Placed This Encounter   Medications    lisinopril (ZESTRIL) 20 MG tablet     Sig: Take 1 tablet (20 mg) by mouth every morning     Dispense:  90 tablet     Refill:  3    hydrochlorothiazide (HYDRODIURIL) 25 MG tablet     Sig: Take 1 tablet (25 mg) by mouth daily     Dispense:  90 tablet     Refill:  3       Medications Discontinued During This Encounter   Medication Reason    hydrochlorothiazide (MICROZIDE) 12.5 MG capsule     lisinopril (ZESTRIL) 10 MG tablet Reorder (No AVS)         Encounter Diagnoses   Name Primary?    Essential hypertension Yes    Coronary artery disease involving native coronary artery of native heart without angina pectoris     Hyperlipidemia with target LDL less than 70     Hypercholesteremia     Benign essential hypertension        CURRENT MEDICATIONS:  Current Outpatient Medications   Medication Sig Dispense Refill    albuterol (VENTOLIN HFA) 108 (90 Base) MCG/ACT inhaler Inhale 2 puffs into the lungs every 6 hours as needed for shortness of breath / dyspnea or wheezing 3 Inhaler 3    amLODIPine (NORVASC) 5 MG tablet Take 1 tablet (5 mg) by mouth daily 90 tablet 3    amoxicillin (AMOXIL) 500 MG capsule TAKE 4 CAPSULES BY MOUTH 1 HOUR  PRIOR TO DENTAL APPOINTMENT 4 capsule 4    Ascorbic Acid (VITAMIN C) 500 MG CAPS Take 500 mg by mouth 2 times daily (with meals)       aspirin (ASA) 81 MG EC tablet Take 81 mg by mouth daily      budesonide-formoterol (SYMBICORT) 80-4.5 MCG/ACT Inhaler Inhale 2 puffs into the lungs two times daily 10.2 g 5    hydrochlorothiazide (HYDRODIURIL) 25 MG tablet Take 1 tablet (25 mg) by mouth daily 90 tablet 3    ketoconazole (NIZORAL) 2 % external cream APPLY TO AFFECTED AREA TOPICALLY EVERY DAY 60 g 0    lisinopril (ZESTRIL) 20 MG tablet Take 1 tablet (20 mg) by mouth every morning 90 tablet 3    Multiple Vitamin (DAILY MULTIVITAMIN PO) Take 2 capsules by mouth every morning      Naphazoline-Pheniramine (OPCON-A OP) Place 1 drop into both eyes daily as needed (cat allergies)      naproxen (NAPROSYN) 500 MG tablet TAKE 1 TABLET BY MOUTH TWICE A DAY WITH MEALS 180 tablet 3    nitroGLYcerin (NITROSTAT) 0.4 MG sublingual tablet For chest pain place 1 tablet under the tongue every 5 minutes for 3 doses. If symptoms persist 5 minutes after 1st dose call 911. 25 tablet 3    Probiotic Product (PROBIOTIC-10 PO) Take 1 capsule by mouth every morning       Psyllium (METAMUCIL FIBER PO) Take 1-2 teaspoonful by mouth daily as needed (mix with a glass of water and drink)      rosuvastatin (CRESTOR) 20 MG tablet Take 1 tablet (20 mg) by mouth daily 90 tablet 3    senna-docusate (SENOKOT-S/PERICOLACE) 8.6-50 MG tablet Take 1-2 tablets by mouth 2 times daily Take while on oral narcotics to prevent or treat constipation. 30 tablet 0    Vitamin D, Cholecalciferol, 1000 units CAPS Take 3,000 Units by mouth every morning       zinc gluconate 50 MG tablet Take 50 mg by mouth every morning          ALLERGIES     Allergies   Allergen Reactions    Cats     Simvastatin Muscle Pain (Myalgia)     achey       PAST MEDICAL HISTORY:  Past Medical History:   Diagnosis Date    ACP (advance care planning)     will bring copy in     Arthritis     Bruit   "   CAD (coronary artery disease) 2002    a stent    CKD (chronic kidney disease)     Esophageal reflux 5/17/2013    Family history of ischemic heart disease     Hyperlipidaemia     Hypertension        PAST SURGICAL HISTORY:  Past Surgical History:   Procedure Laterality Date    ARTHROPLASTY HIP Right 2/23/2021    Procedure: RIGHT TOTAL HIP ARTHROPLASTY;  Surgeon: Gera Davison MD;  Location: [facility] OR    CYSTECTOMY PILONIDAL      HEART CATH, ANGIOPLASTY  10/4/02    3.0 X 8 mm Velocity stent    ROTATOR CUFF REPAIR RT/LT  2009    Dr. Butler       FAMILY HISTORY:  Family History   Problem Relation Age of Onset    Cerebrovascular Disease Mother     C.A.D. Father     Cancer Father         bone    Coronary Artery Disease Brother     Heart Disease Brother     No Known Problems Brother     No Known Problems Sister     No Known Problems Sister        SOCIAL HISTORY:  Social History     Socioeconomic History    Marital status:      Spouse name: None    Number of children: None    Years of education: None    Highest education level: None   Occupational History    Occupation: Finance   Tobacco Use    Smoking status: Never    Smokeless tobacco: Never   Substance and Sexual Activity    Alcohol use: Yes     Alcohol/week: 0.8 standard drinks of alcohol     Types: 1 Standard drinks or equivalent per week     Comment: occasionally    Drug use: No    Sexual activity: Yes     Partners: Female   Other Topics Concern    Parent/sibling w/ CABG, MI or angioplasty before 65F 55M? No    Sleep Concern Yes     Comment: occ    Stress Concern No    Exercise Yes     Comment: walks daily       Review of Systems:  Skin:          Eyes:         ENT:         Respiratory:          Cardiovascular:         Gastroenterology:        Genitourinary:         Musculoskeletal:         Neurologic:         Psychiatric:         Heme/Lymph/Imm:         Endocrine:           Physical Exam:  Vitals: BP (!) 152/82   Pulse 58   Ht 1.861 m (6' 1.25\")   Wt " 101.7 kg (224 lb 1.6 oz)   SpO2 98%   BMI 29.36 kg/m      Constitutional:  cooperative, alert and oriented, well developed, well nourished, in no acute distress overweight      Skin:  warm and dry to the touch, no apparent skin lesions or masses noted          Head:  normocephalic, no masses or lesions        Eyes:  pupils equal and round, conjunctivae and lids unremarkable, sclera white, no xanthalasma, EOMS intact, no nystagmus        Lymph:No Cervical lymphadenopathy present     ENT:  no pallor or cyanosis, dentition good        Neck:  carotid pulses are full and equal bilaterally, JVP normal, no carotid bruit        Respiratory:  normal breath sounds, clear to auscultation, normal A-P diameter, normal symmetry, normal respiratory excursion, no use of accessory muscles         Cardiac: regular rhythm;normal S1 and S2;apical impulse not displaced;no murmurs, gallops or rubs detected   S4            pulses full and equal, no bruits auscultated                                        GI:  not assessed this visit        Extremities and Muscular Skeletal:  no deformities, clubbing, cyanosis, erythema observed;no edema              Neurological:  no gross motor deficits;affect appropriate        Psych:  Alert and Oriented x 3        CC  No referring provider defined for this encounter.          Thank you for allowing me to participate in the care of your patient.      Sincerely,     Braulio Blanca MD, MD     [facility]

## 2023-12-19 ENCOUNTER — ALLIED HEALTH/NURSE VISIT (OUTPATIENT)
Dept: CARDIOLOGY | Facility: CLINIC | Age: 73
End: 2023-12-19
Attending: INTERNAL MEDICINE
Payer: COMMERCIAL

## 2023-12-19 ENCOUNTER — TELEPHONE (OUTPATIENT)
Dept: CARDIOLOGY | Facility: CLINIC | Age: 73
End: 2023-12-19

## 2023-12-19 ENCOUNTER — LAB (OUTPATIENT)
Dept: LAB | Facility: CLINIC | Age: 73
End: 2023-12-19
Payer: COMMERCIAL

## 2023-12-19 DIAGNOSIS — I10 ESSENTIAL HYPERTENSION: ICD-10-CM

## 2023-12-19 LAB
ANION GAP SERPL CALCULATED.3IONS-SCNC: 9 MMOL/L (ref 7–15)
BUN SERPL-MCNC: 23.8 MG/DL (ref 8–23)
CALCIUM SERPL-MCNC: 9.6 MG/DL (ref 8.8–10.2)
CHLORIDE SERPL-SCNC: 101 MMOL/L (ref 98–107)
CREAT SERPL-MCNC: 1.05 MG/DL (ref 0.67–1.17)
DEPRECATED HCO3 PLAS-SCNC: 30 MMOL/L (ref 22–29)
EGFRCR SERPLBLD CKD-EPI 2021: 76 ML/MIN/1.73M2
GLUCOSE SERPL-MCNC: 90 MG/DL (ref 70–99)
POTASSIUM SERPL-SCNC: 4.1 MMOL/L (ref 3.4–5.3)
SODIUM SERPL-SCNC: 140 MMOL/L (ref 135–145)

## 2023-12-19 PROCEDURE — 80048 BASIC METABOLIC PNL TOTAL CA: CPT | Performed by: INTERNAL MEDICINE

## 2023-12-19 PROCEDURE — 99207 PR NO CHARGE NURSE ONLY: CPT

## 2023-12-19 PROCEDURE — 36415 COLL VENOUS BLD VENIPUNCTURE: CPT | Performed by: INTERNAL MEDICINE

## 2023-12-19 NOTE — TELEPHONE ENCOUNTER
BP is under good control with the higher dose of hydrochlorothiazide.  The creatinine is essentially the same as previously with a slightly higher BUN.  Continue with the hydrochlorothiazide at the higher dose.  Thanks

## 2023-12-19 NOTE — TELEPHONE ENCOUNTER
Last office visit: 23    Previous blood pressure:152/82  Mm Hg     Previous heart rate: 58  bpm      Morning medications were taken at: Lisinopril and hydrochlorothiazide both took at 5:30-5:45 am today.     Today's blood pressure:145/78  mm Hg    Today's heart rate:54 bpm     Ordering Provider:Dr. Gustavo Aparicio     Results sent to team # : Team #5    Additional comments:    2nd BP Readin/78, pulse 52         Luzma Escobar CMA

## 2023-12-19 NOTE — TELEPHONE ENCOUNTER
Reviewed BMP showing   Recent Labs   Lab Test 23  0831 23  0736    140   POTASSIUM 4.1 4.3   CHLORIDE 101 102   CO2 30* 27   ANIONGAP 9 11   GLC 90 100*   BUN 23.8* 19.5   CR 1.05 1.03   MARLENE 9.6 9.4      BP check showed blood pressure:145/78  mm Hg, heart rate:54 bpm   2nd BP Readin/78, pulse 52     Per office note dated 23, Dr. Blanca recommended:   1.  I would increase the dose of hydrochlorothiazide to 25 mg and the dose of lisinopril to 20 mg.  I will have the patient return in 2 weeks time and I will have a blood pressure check at that time and a basic metabolic profile to ensure that his electrolytes and renal function have not been impacted by the increased dose of hydrochlorothiazide and lisinopril.  2.  I encouraged the patient to avoid salt, adhere to a more Mediterranean style diet and to exercise as much as he can.  In 3 months time we will recheck his lipids with ALT.  3.  I will see the patient back again in 1 years time and we will repeat his lipids with liver function tests and basic metabolic profile at that time.  As always the patient is been told to contact me with any questions or any concerns.    Will message Dr. Blanca to review. Chuy BROWNE

## 2023-12-20 NOTE — TELEPHONE ENCOUNTER
Attempted to call patient with results and recommendations from Dr. Regino Aparicio, no answer or voicemail.  Results and recommendations mailed to patient with request to call with questions or concerns.  ARTHUR Madden RN

## 2024-02-25 DIAGNOSIS — J45.20 MILD INTERMITTENT ASTHMA WITHOUT COMPLICATION: ICD-10-CM

## 2024-02-26 RX ORDER — BUDESONIDE AND FORMOTEROL FUMARATE DIHYDRATE 80; 4.5 UG/1; UG/1
2 AEROSOL RESPIRATORY (INHALATION)
Qty: 30.6 G | Refills: 1 | Status: SHIPPED | OUTPATIENT
Start: 2024-02-26

## 2024-03-26 ENCOUNTER — LAB (OUTPATIENT)
Dept: LAB | Facility: CLINIC | Age: 74
End: 2024-03-26
Payer: COMMERCIAL

## 2024-03-26 DIAGNOSIS — I25.10 CORONARY ARTERY DISEASE INVOLVING NATIVE CORONARY ARTERY OF NATIVE HEART WITHOUT ANGINA PECTORIS: ICD-10-CM

## 2024-03-26 LAB
ALT SERPL W P-5'-P-CCNC: 20 U/L (ref 0–70)
CHOLEST SERPL-MCNC: 135 MG/DL
FASTING STATUS PATIENT QL REPORTED: YES
HDLC SERPL-MCNC: 50 MG/DL
LDLC SERPL CALC-MCNC: 66 MG/DL
NONHDLC SERPL-MCNC: 85 MG/DL
TRIGL SERPL-MCNC: 93 MG/DL

## 2024-03-26 PROCEDURE — 80061 LIPID PANEL: CPT | Performed by: INTERNAL MEDICINE

## 2024-03-26 PROCEDURE — 36415 COLL VENOUS BLD VENIPUNCTURE: CPT | Performed by: INTERNAL MEDICINE

## 2024-03-26 PROCEDURE — 84460 ALANINE AMINO (ALT) (SGPT): CPT | Performed by: INTERNAL MEDICINE

## 2024-05-12 DIAGNOSIS — B37.2 YEAST INFECTION OF THE SKIN: ICD-10-CM

## 2024-05-13 RX ORDER — KETOCONAZOLE 20 MG/G
CREAM TOPICAL
Qty: 60 G | Refills: 0 | Status: SHIPPED | OUTPATIENT
Start: 2024-05-13

## 2024-05-13 NOTE — TELEPHONE ENCOUNTER
Med: ketoconazole (NIZORAL) 2 % external cream     LOV (related): 10/9/23      Due for F/U around: Oct 2024    Next Appt: none

## 2024-06-11 ENCOUNTER — OFFICE VISIT (OUTPATIENT)
Dept: FAMILY MEDICINE | Facility: CLINIC | Age: 74
End: 2024-06-11

## 2024-06-11 VITALS
DIASTOLIC BLOOD PRESSURE: 82 MMHG | BODY MASS INDEX: 29.05 KG/M2 | OXYGEN SATURATION: 99 % | HEIGHT: 73 IN | WEIGHT: 219.2 LBS | HEART RATE: 56 BPM | SYSTOLIC BLOOD PRESSURE: 147 MMHG

## 2024-06-11 DIAGNOSIS — I25.118 CORONARY ARTERY DISEASE OF NATIVE ARTERY OF NATIVE HEART WITH STABLE ANGINA PECTORIS (H): ICD-10-CM

## 2024-06-11 DIAGNOSIS — N18.2 STAGE 2 CHRONIC KIDNEY DISEASE: ICD-10-CM

## 2024-06-11 DIAGNOSIS — I10 BENIGN ESSENTIAL HYPERTENSION: ICD-10-CM

## 2024-06-11 DIAGNOSIS — M79.661 PAIN OF RIGHT LOWER LEG: Primary | ICD-10-CM

## 2024-06-11 DIAGNOSIS — I70.0 ATHEROSCLEROSIS OF AORTA (H): ICD-10-CM

## 2024-06-11 PROBLEM — N18.31 STAGE 3A CHRONIC KIDNEY DISEASE (H): Status: RESOLVED | Noted: 2023-02-16 | Resolved: 2024-06-11

## 2024-06-11 LAB
ANION GAP SERPL CALCULATED.3IONS-SCNC: 11 MMOL/L (ref 7–15)
BUN SERPL-MCNC: 16.3 MG/DL (ref 8–23)
CALCIUM SERPL-MCNC: 9.5 MG/DL (ref 8.8–10.2)
CHLORIDE SERPL-SCNC: 104 MMOL/L (ref 98–107)
CREAT SERPL-MCNC: 1 MG/DL (ref 0.67–1.17)
DEPRECATED HCO3 PLAS-SCNC: 25 MMOL/L (ref 22–29)
EGFRCR SERPLBLD CKD-EPI 2021: 80 ML/MIN/1.73M2
FASTING STATUS PATIENT QL REPORTED: ABNORMAL
GLUCOSE SERPL-MCNC: 105 MG/DL (ref 70–99)
POTASSIUM SERPL-SCNC: 4.2 MMOL/L (ref 3.4–5.3)
SODIUM SERPL-SCNC: 140 MMOL/L (ref 135–145)

## 2024-06-11 PROCEDURE — 36415 COLL VENOUS BLD VENIPUNCTURE: CPT | Performed by: FAMILY MEDICINE

## 2024-06-11 PROCEDURE — 99214 OFFICE O/P EST MOD 30 MIN: CPT | Performed by: FAMILY MEDICINE

## 2024-06-11 PROCEDURE — 80048 BASIC METABOLIC PNL TOTAL CA: CPT | Mod: 90 | Performed by: FAMILY MEDICINE

## 2024-06-11 PROCEDURE — G2211 COMPLEX E/M VISIT ADD ON: HCPCS | Performed by: FAMILY MEDICINE

## 2024-06-11 RX ORDER — AMLODIPINE BESYLATE 10 MG/1
10 TABLET ORAL DAILY
Qty: 90 TABLET | Refills: 3 | Status: SHIPPED | OUTPATIENT
Start: 2024-06-11

## 2024-06-11 ASSESSMENT — ASTHMA QUESTIONNAIRES: ACT_TOTALSCORE: 25

## 2024-06-11 NOTE — PROGRESS NOTES
"Problem(s) Oriented visit        SUBJECTIVE:                                                    Peter Hernandez II is a 74 year old male who presents to clinic today for the following health issues :  Hypertension (Recheck )    1. Atherosclerosis of aorta (H24)  On statin    2. Coronary artery disease of native artery of native heart with stable angina pectoris (H24)  No chest pains    3. Stage 2 chronic kidney disease  Was stage 3 previously    4. Benign essential hypertension  Still a bit high    5. Pain of right lower leg  Catchs when has been sitting         Problem list, Medication list, Allergies, and Medical/Social/Surgical histories reviewed in [facility] and updated as appropriate.   Additional history: as documented    ROS:  General and Resp. completed and negative except as noted above    Histories: reviewed    OBJECTIVE:                                                    BP (!) 147/82   Pulse 56   Ht 1.861 m (6' 1.25\")   Wt 99.4 kg (219 lb 3.2 oz)   SpO2 99%   BMI 28.72 kg/m    Body mass index is 28.72 kg/m .   APPEARANCE: = Relaxed and in no distress  Resp effort = Calm regular breathing  Breath Sounds = Good air movement with no rales or rhonchi in any lung fields  Heart Rate, Rhythm, & sounds (no Murm)  = Regular rate and rhythm with no S3, S4, or murmur appreciated.  Musculsktl: extremities normal- no gross deformities noted and full rom in the spine  SKIN = absent significant rashes or lesions   Mood/Affect = Cooperative and interested     ASSESSMENT/PLAN:                                                    Quality gaps reviewed    Reuben was seen today for hypertension.    Diagnoses and all orders for this visit:    Pain of right lower leg  Catches, good rom in the leg, fascia catching  -     Physical Therapy  Referral; Future    Atherosclerosis of aorta (H24)  We will continue to aggressively manage secondary risk factors, including aggressive BP control (under 130/ideally), aggressive LDL " lowering with statin (goal under 100 for sure/under 70 ideally), no smoking, diabetes prevention/management, no smoking, and use of either ASA or similar anti platelet agent if tolerated.     Coronary artery disease of native artery of native heart with stable angina pectoris (H24)  The patient does not report any signs or symptoms of angina or active cardiac ischemia.   They do not report any relative changes in their ability to perform physical activities over the past year.    We discussed aggressive secondary risk factor modification, including aggressive BP control (under 130/ideally), aggressive LDL lowering with statin (goal under 100 for sure/under 70 ideally), no smoking, diabetes prevention/management, no smoking, and use of either ASA or similar anti platelet agent if tolerated.      Stage 2 chronic kidney disease  Chronic kidney disease due to HTN and DM.  Check urine for protein.   Patient is on ACE/ARB.  Patient is on a statin.  Told the patient to avoid NSAIDs if at all possible.   Will monitor closely and send to Nephrologist if renal function continues to decline.      -     Basic metabolic panel; Future    Benign essential hypertension  Reviewed his current HTN management. Discussed our goal for him is a systolic pressure at or below 128 and diastolic pressure at or below 83.  We today managed his prescriptions with refills ensured to ensure availabilty of current medications.  Discussed the importance for aggressive management of HTN to prevent vascular complications later.  Recommended lower fat, lower carbohydrate, and lower sodium (<2000 mg)diet. Required intervals for follow up on HTN, lab studies reviewed.    Strongly recommened he follow his blood pressures outside the clinic to ensure that BPs are remaining within guidelines,.  Instructed to contact me if the readings are not within guidelines on a regular basis so we can adjust treatment as needed.    -     Basic metabolic panel; Future  -      VENOUS COLLECTION  -     amLODIPine (NORVASC) 10 MG tablet; Take 1 tablet (10 mg) by mouth daily        Work on weight loss  Regular exercise    Health maintenance items are reviewed in Epic and correct as of today:    Willei Nam MD  [facility]  [facility]  [phone]    For any issues my office # is [phone]

## 2024-06-25 ENCOUNTER — TRANSFERRED RECORDS (OUTPATIENT)
Dept: FAMILY MEDICINE | Facility: CLINIC | Age: 74
End: 2024-06-25

## 2024-07-16 ENCOUNTER — TRANSFERRED RECORDS (OUTPATIENT)
Dept: FAMILY MEDICINE | Facility: CLINIC | Age: 74
End: 2024-07-16

## 2024-07-26 ENCOUNTER — TRANSFERRED RECORDS (OUTPATIENT)
Dept: FAMILY MEDICINE | Facility: CLINIC | Age: 74
End: 2024-07-26

## 2024-08-23 ENCOUNTER — TRANSFERRED RECORDS (OUTPATIENT)
Dept: FAMILY MEDICINE | Facility: CLINIC | Age: 74
End: 2024-08-23

## 2024-09-07 DIAGNOSIS — M79.672 LEFT FOOT PAIN: ICD-10-CM

## 2024-09-09 RX ORDER — NAPROXEN 500 MG/1
TABLET ORAL
Qty: 180 TABLET | Refills: 3 | Status: SHIPPED | OUTPATIENT
Start: 2024-09-09

## 2024-09-09 NOTE — TELEPHONE ENCOUNTER
Med: Naproxen     LOV (related): 6/11/24 with Dr. Nam       Due for F/U around: not specified    Next Appt: 10/22/24 with Dr. Nam

## 2024-09-23 PROCEDURE — 90653 IIV ADJUVANT VACCINE IM: CPT

## 2024-09-23 PROCEDURE — G0008 ADMIN INFLUENZA VIRUS VAC: HCPCS

## 2024-09-27 ENCOUNTER — TRANSFERRED RECORDS (OUTPATIENT)
Dept: FAMILY MEDICINE | Facility: CLINIC | Age: 74
End: 2024-09-27

## 2024-10-13 DIAGNOSIS — I25.118 CORONARY ARTERY DISEASE OF NATIVE ARTERY OF NATIVE HEART WITH STABLE ANGINA PECTORIS (H): Primary | ICD-10-CM

## 2024-10-13 RX ORDER — AMLODIPINE BESYLATE 5 MG/1
5 TABLET ORAL DAILY
Qty: 270 TABLET | Refills: 2 | Status: SHIPPED | OUTPATIENT
Start: 2024-10-13 | End: 2024-10-22 | Stop reason: DRUGHIGH

## 2024-10-17 DIAGNOSIS — I10 ESSENTIAL HYPERTENSION: ICD-10-CM

## 2024-10-17 RX ORDER — HYDROCHLOROTHIAZIDE 25 MG/1
25 TABLET ORAL DAILY
Qty: 90 TABLET | Refills: 0 | Status: SHIPPED | OUTPATIENT
Start: 2024-10-17

## 2024-10-18 RX ORDER — ROSUVASTATIN CALCIUM 20 MG/1
20 TABLET, COATED ORAL DAILY
Qty: 90 TABLET | Refills: 3 | Status: CANCELLED | OUTPATIENT
Start: 2024-10-18

## 2024-10-18 NOTE — PATIENT INSTRUCTIONS
Patient Education   Preventive Care Advice   This is general advice given by our system to help you stay healthy. However, your care team may have specific advice just for you. Please talk to your care team about your preventive care needs.  Nutrition  Eat 5 or more servings of fruits and vegetables each day.  Try wheat bread, brown rice and whole grain pasta (instead of white bread, rice, and pasta).  Get enough calcium and vitamin D. Check the label on foods and aim for 100% of the RDA (recommended daily allowance).  Lifestyle  Exercise at least 150 minutes each week  (30 minutes a day, 5 days a week).  Do muscle strengthening activities 2 days a week. These help control your weight and prevent disease.  No smoking.  Wear sunscreen to prevent skin cancer.  Have a dental exam and cleaning every 6 months.  Yearly exams  See your health care team every year to talk about:  Any changes in your health.  Any medicines your care team has prescribed.  Preventive care, family planning, and ways to prevent chronic diseases.  Shots (vaccines)   HPV shots (up to age 26), if you've never had them before.  Hepatitis B shots (up to age 59), if you've never had them before.  COVID-19 shot: Get this shot when it's due.  Flu shot: Get a flu shot every year.  Tetanus shot: Get a tetanus shot every 10 years.  Pneumococcal, hepatitis A, and RSV shots: Ask your care team if you need these based on your risk.  Shingles shot (for age 50 and up)  General health tests  Diabetes screening:  Starting at age 35, Get screened for diabetes at least every 3 years.  If you are younger than age 35, ask your care team if you should be screened for diabetes.  Cholesterol test: At age 39, start having a cholesterol test every 5 years, or more often if advised.  Bone density scan (DEXA): At age 50, ask your care team if you should have this scan for osteoporosis (brittle bones).  Hepatitis C: Get tested at least once in your life.  STIs (sexually  transmitted infections)  Before age 24: Ask your care team if you should be screened for STIs.  After age 24: Get screened for STIs if you're at risk. You are at risk for STIs (including HIV) if:  You are sexually active with more than one person.  You don't use condoms every time.  You or a partner was diagnosed with a sexually transmitted infection.  If you are at risk for HIV, ask about PrEP medicine to prevent HIV.  Get tested for HIV at least once in your life, whether you are at risk for HIV or not.  Cancer screening tests  Cervical cancer screening: If you have a cervix, begin getting regular cervical cancer screening tests starting at age 21.  Breast cancer scan (mammogram): If you've ever had breasts, begin having regular mammograms starting at age 40. This is a scan to check for breast cancer.  Colon cancer screening: It is important to start screening for colon cancer at age 45.  Have a colonoscopy test every 10 years (or more often if you're at risk) Or, ask your provider about stool tests like a FIT test every year or Cologuard test every 3 years.  To learn more about your testing options, visit:   .  For help making a decision, visit:   https://bit.ly/wr46954.  Prostate cancer screening test: If you have a prostate, ask your care team if a prostate cancer screening test (PSA) at age 55 is right for you.  Lung cancer screening: If you are a current or former smoker ages 50 to 80, ask your care team if ongoing lung cancer screenings are right for you.  For informational purposes only. Not to replace the advice of your health care provider. Copyright   2023 [facility]. All rights reserved. Clinically reviewed by the [facility] Transitions Program. [employer] [id number] - REV 01/24.

## 2024-10-21 DIAGNOSIS — E78.00 HYPERCHOLESTEREMIA: ICD-10-CM

## 2024-10-21 RX ORDER — ROSUVASTATIN CALCIUM 20 MG/1
20 TABLET, COATED ORAL DAILY
Qty: 90 TABLET | Refills: 3 | Status: SHIPPED | OUTPATIENT
Start: 2024-10-21

## 2024-10-21 NOTE — TELEPHONE ENCOUNTER
Med: Rosuvastatin      LOV (related): 10/9/23-cpx     Last Lab: 3/26/24        Due for F/U around:  due for CPX      Next Appt: 10/22/24 (CPX ) with Cyril           Cholesterol   Date Value Ref Range Status   03/26/2024 135 <200 mg/dL Final   12/04/2023 149 <200 mg/dL Final   10/09/2023 127 100 - 199 mg/dL Final   10/03/2022 128 100 - 199 mg/dl Final   09/13/2021 161 100 - 199 mg/dL Final   06/19/2020 135 100 - 199 mg/dL Final     HDL Cholesterol   Date Value Ref Range Status   09/13/2021 56 >39 mg/dL Final   06/19/2020 55 >39 mg/dL Final     HDL   Date Value Ref Range Status   10/09/2023 50 40 mg/dL Final   10/03/2022 45 40 mg/dl Final     Direct Measure HDL   Date Value Ref Range Status   03/26/2024 50 >=40 mg/dL Final   12/04/2023 59 >=40 mg/dL Final     LDL Cholesterol Calculated   Date Value Ref Range Status   03/26/2024 66 <=100 mg/dL Final   12/04/2023 73 <=100 mg/dL Final   09/13/2021 88 0 - 99 mg/dL Final   06/19/2020 64 0 - 99 mg/dL Final     LDL CALCULATED ([facility])   Date Value Ref Range Status   10/09/2023 61 0 - 130 mg/dL Final   10/03/2022 69 0 - 130 mg/dl Final     Triglycerides   Date Value Ref Range Status   03/26/2024 93 <150 mg/dL Final   12/04/2023 85 <150 mg/dL Final   10/09/2023 85 0 - 149 mg/dL Final   10/03/2022 70 0 - 149 mg/dl Final   09/13/2021 92 0 - 149 mg/dL Final   06/19/2020 80 0 - 149 mg/dL Final     Cholesterol/HDL Ratio   Date Value Ref Range Status   07/01/2015 2.7 <4.4 Final

## 2024-10-22 ENCOUNTER — OFFICE VISIT (OUTPATIENT)
Dept: FAMILY MEDICINE | Facility: CLINIC | Age: 74
End: 2024-10-22

## 2024-10-22 VITALS
SYSTOLIC BLOOD PRESSURE: 141 MMHG | OXYGEN SATURATION: 98 % | HEART RATE: 55 BPM | WEIGHT: 209 LBS | BODY MASS INDEX: 27.39 KG/M2 | DIASTOLIC BLOOD PRESSURE: 65 MMHG

## 2024-10-22 DIAGNOSIS — I10 BENIGN ESSENTIAL HYPERTENSION: ICD-10-CM

## 2024-10-22 DIAGNOSIS — Z00.00 ENCOUNTER FOR MEDICARE ANNUAL WELLNESS EXAM: Primary | ICD-10-CM

## 2024-10-22 DIAGNOSIS — I25.118 CORONARY ARTERY DISEASE OF NATIVE ARTERY OF NATIVE HEART WITH STABLE ANGINA PECTORIS (H): ICD-10-CM

## 2024-10-22 DIAGNOSIS — J45.20 MILD INTERMITTENT ASTHMA WITHOUT COMPLICATION: ICD-10-CM

## 2024-10-22 DIAGNOSIS — E78.00 HYPERCHOLESTEREMIA: ICD-10-CM

## 2024-10-22 DIAGNOSIS — M19.072 PRIMARY OSTEOARTHRITIS OF LEFT FOOT: ICD-10-CM

## 2024-10-22 DIAGNOSIS — I70.0 ATHEROSCLEROSIS OF AORTA (H): ICD-10-CM

## 2024-10-22 DIAGNOSIS — B37.2 YEAST INFECTION OF THE SKIN: ICD-10-CM

## 2024-10-22 DIAGNOSIS — Z12.5 SCREENING FOR PROSTATE CANCER: ICD-10-CM

## 2024-10-22 DIAGNOSIS — Z23 NEED FOR VACCINATION: ICD-10-CM

## 2024-10-22 LAB
% GRANULOCYTES: 65.6 % (ref 42.2–75.2)
ALBUMIN SERPL BCG-MCNC: 4.2 G/DL (ref 3.5–5.2)
ALP SERPL-CCNC: 71 U/L (ref 40–150)
ALT SERPL W P-5'-P-CCNC: 24 U/L (ref 0–70)
ANION GAP SERPL CALCULATED.3IONS-SCNC: 8 MMOL/L (ref 7–15)
AST SERPL W P-5'-P-CCNC: 23 U/L (ref 0–45)
BILIRUB SERPL-MCNC: 0.3 MG/DL
BUN SERPL-MCNC: 17.4 MG/DL (ref 8–23)
CALCIUM SERPL-MCNC: 9.4 MG/DL (ref 8.8–10.4)
CHLORIDE SERPL-SCNC: 103 MMOL/L (ref 98–107)
CHOLESTEROL: 130 MG/DL (ref 100–199)
CREAT SERPL-MCNC: 0.94 MG/DL (ref 0.67–1.17)
EGFRCR SERPLBLD CKD-EPI 2021: 86 ML/MIN/1.73M2
FASTING STATUS PATIENT QL REPORTED: YES
FASTING?: YES
GLUCOSE SERPL-MCNC: 108 MG/DL (ref 70–99)
HCO3 SERPL-SCNC: 30 MMOL/L (ref 22–29)
HCT VFR BLD AUTO: 40.1 % (ref 39–51)
HDL (RMG): 51 MG/DL (ref 40–?)
HEMOGLOBIN: 13.3 G/DL (ref 13.4–17.5)
LDL CALCULATED (RMG): 65 MG/DL (ref 0–130)
LYMPHOCYTES NFR BLD AUTO: 24.5 % (ref 20.5–51.1)
MCH RBC QN AUTO: 30.2 PG (ref 27–31)
MCHC RBC AUTO-ENTMCNC: 33.2 G/DL (ref 33–37)
MCV RBC AUTO: 90.9 FL (ref 80–100)
MONOCYTES NFR BLD AUTO: 9.9 % (ref 1.7–9.3)
PLATELET # BLD AUTO: 183 K/UL (ref 140–450)
POTASSIUM SERPL-SCNC: 4.4 MMOL/L (ref 3.4–5.3)
PROT SERPL-MCNC: 6.9 G/DL (ref 6.4–8.3)
PSA SERPL DL<=0.01 NG/ML-MCNC: 0.99 NG/ML (ref 0–6.5)
RBC # BLD AUTO: 4.41 X10/CMM (ref 4.2–5.9)
SODIUM SERPL-SCNC: 141 MMOL/L (ref 135–145)
TRIGLYCERIDES (RMG): 71 MG/DL (ref 0–149)
WBC # BLD AUTO: 8.3 X10/CMM (ref 3.8–11)

## 2024-10-22 PROCEDURE — G0103 PSA SCREENING: HCPCS | Mod: 90 | Performed by: FAMILY MEDICINE

## 2024-10-22 PROCEDURE — 80061 LIPID PANEL: CPT | Mod: QW | Performed by: FAMILY MEDICINE

## 2024-10-22 PROCEDURE — 36415 COLL VENOUS BLD VENIPUNCTURE: CPT | Performed by: FAMILY MEDICINE

## 2024-10-22 PROCEDURE — 85025 COMPLETE CBC W/AUTO DIFF WBC: CPT | Performed by: FAMILY MEDICINE

## 2024-10-22 PROCEDURE — 80053 COMPREHEN METABOLIC PANEL: CPT | Performed by: FAMILY MEDICINE

## 2024-10-22 PROCEDURE — G0439 PPPS, SUBSEQ VISIT: HCPCS | Performed by: FAMILY MEDICINE

## 2024-10-22 PROCEDURE — 99214 OFFICE O/P EST MOD 30 MIN: CPT | Mod: 25 | Performed by: FAMILY MEDICINE

## 2024-10-22 RX ORDER — AMOXICILLIN 500 MG/1
CAPSULE ORAL
Qty: 4 CAPSULE | Refills: 4 | Status: SHIPPED | OUTPATIENT
Start: 2024-10-22

## 2024-10-22 RX ORDER — AMLODIPINE BESYLATE 10 MG/1
10 TABLET ORAL DAILY
Qty: 90 TABLET | Refills: 3 | Status: SHIPPED | OUTPATIENT
Start: 2024-10-22

## 2024-10-22 RX ORDER — NAPROXEN 500 MG/1
TABLET ORAL
Qty: 180 TABLET | Refills: 3 | Status: SHIPPED | OUTPATIENT
Start: 2024-10-22

## 2024-10-22 RX ORDER — ALBUTEROL SULFATE 90 UG/1
2 INHALANT RESPIRATORY (INHALATION) EVERY 6 HOURS PRN
Qty: 18 G | Refills: 3 | Status: SHIPPED | OUTPATIENT
Start: 2024-10-22

## 2024-10-22 RX ORDER — KETOCONAZOLE 20 MG/G
CREAM TOPICAL
Qty: 60 G | Refills: 3 | Status: SHIPPED | OUTPATIENT
Start: 2024-10-22

## 2024-10-22 RX ORDER — AMLODIPINE BESYLATE 5 MG/1
5 TABLET ORAL DAILY
Qty: 270 TABLET | Refills: 2 | Status: SHIPPED | OUTPATIENT
Start: 2024-10-22

## 2024-10-22 RX ORDER — BUDESONIDE AND FORMOTEROL FUMARATE DIHYDRATE 80; 4.5 UG/1; UG/1
2 AEROSOL RESPIRATORY (INHALATION)
Qty: 30.6 G | Refills: 1 | Status: SHIPPED | OUTPATIENT
Start: 2024-10-22

## 2024-10-22 RX ORDER — NITROGLYCERIN 0.4 MG/1
TABLET SUBLINGUAL
Qty: 25 TABLET | Refills: 3 | Status: SHIPPED | OUTPATIENT
Start: 2024-10-22

## 2024-10-22 SDOH — HEALTH STABILITY: PHYSICAL HEALTH: ON AVERAGE, HOW MANY DAYS PER WEEK DO YOU ENGAGE IN MODERATE TO STRENUOUS EXERCISE (LIKE A BRISK WALK)?: 4 DAYS

## 2024-10-22 SDOH — HEALTH STABILITY: PHYSICAL HEALTH: ON AVERAGE, HOW MANY MINUTES DO YOU ENGAGE IN EXERCISE AT THIS LEVEL?: 30 MIN

## 2024-10-22 ASSESSMENT — SOCIAL DETERMINANTS OF HEALTH (SDOH): HOW OFTEN DO YOU GET TOGETHER WITH FRIENDS OR RELATIVES?: ONCE A WEEK

## 2024-10-22 NOTE — PROGRESS NOTES
Preventive Care Visit  [facility]  Willie Nam MD, Family Medicine  Oct 22, 2024    Assessment & Plan     Encounter for Medicare annual wellness exam    - VENOUS COLLECTION  - amoxicillin (AMOXIL) 500 MG capsule  Dispense: 4 capsule; Refill: 4    Atherosclerosis of aorta (H)  We will continue to aggressively manage secondary risk factors, including aggressive BP control (under 130/ideally), aggressive LDL lowering with statin (goal under 100 for sure/under 70 ideally), no smoking, diabetes prevention/management, no smoking, and use of either ASA or similar anti platelet agent if tolerated.       Coronary artery disease of native artery of native heart with stable angina pectoris (H)  No chest pains, continue statin and very rare nitroglycerin works  - Lipid Profile ([facility])  - amLODIPine (NORVASC) 5 MG tablet  Dispense: 270 tablet; Refill: 2  - CBC with Diff/Plt ([facility])  - nitroGLYcerin (NITROSTAT) 0.4 MG sublingual tablet  Dispense: 25 tablet; Refill: 3  Mild intermittent asthma without complication  Doing well with symbicort  - Asthma Action Plan (AAP)  - albuterol (VENTOLIN HFA) 108 (90 Base) MCG/ACT inhaler  Dispense: 18 g; Refill: 3  - budesonide-formoterol (SYMBICORT) 80-4.5 MCG/ACT Inhaler  Dispense: 30.6 g; Refill: 1    Yeast infection of the skin  Continue this  - ketoconazole (NIZORAL) 2 % external cream  Dispense: 60 g; Refill: 3    Benign essential hypertension  Reviewed his current HTN management. Discussed our goal for him is a systolic pressure at or below 128 and diastolic pressure at or below 83.  We today managed his prescriptions with refills ensured to ensure availabilty of current medications.  Discussed the importance for aggressive management of HTN to prevent vascular complications later.  Recommended lower fat, lower carbohydrate, and lower sodium (<2000 mg)diet. Required intervals for follow up on HTN, lab studies reviewed.    Strongly recommened he follow his blood  "pressures outside the clinic to ensure that BPs are remaining within guidelines,.  Instructed to contact me if the readings are not within guidelines on a regular basis so we can adjust treatment as needed.    - Comprehensive metabolic panel  - amLODIPine (NORVASC) 10 MG tablet  Dispense: 90 tablet; Refill: 3      Hypercholesteremia  Hyperlipidemia  Discussed current lipid results, previous results (if available) current guidelines (NCEP) for treatment and goals for lipids.    Discussed ongoing lifestyle modification, dietary changes (low fat, low simple carb) and regular aerobic exercise.    Discussed the link between dysmetabolic syndrome and impaired glucose tolerance seen in certain patterns of lipids.     Reviewed medication use for lipid lowering, including the statins are their possible side effects of myalgias, rhabdomyolysis, and liver toxicity.  We today managed his prescriptions with refills ensured to ensure availabilty of current medications.  Discussed the importance for aggressive management of dyslipidemia to prevent vascular complications later.     Instructed to contact me if he develop any intolerance to the treatment.      Primary osteoarthritis of left foot  Well controlled with   - naproxen (NAPROSYN) 500 MG tablet  Dispense: 180 tablet; Refill: 3    Need for vaccination    - COVID-19 12+ (PFIZER)    Screening for prostate cancer    - Prostate Specific Antigen Screen      Patient has been advised of split billing requirements and indicates understanding: Yes        BMI  Estimated body mass index is 27.39 kg/m  as calculated from the following:    Height as of 6/11/24: 1.861 m (6' 1.25\").    Weight as of this encounter: 94.8 kg (209 lb).       Counseling  Appropriate preventive services were addressed with this patient via screening, questionnaire, or discussion as appropriate for fall prevention, nutrition, physical activity, Tobacco-use cessation, social engagement, weight loss and cognition.  " Checklist reviewing preventive services available has been given to the patient.  Reviewed patient's diet, addressing concerns and/or questions.   He is at risk for psychosocial distress and has been provided with information to reduce risk.       Work on weight loss    No follow-ups on file.    Subjective   Reuben is a 74 year old, presenting for the following:  Physical (Fasting/) and Health Maintenance (Declines covid vaccine /)          Health Care Directive  Patient has a Health Care Directive on file  Advance care planning document is on file and is current.    HPI  Here for AWV        10/22/2024   General Health   How would you rate your overall physical health? Excellent   Feel stress (tense, anxious, or unable to sleep) Only a little      (!) STRESS CONCERN      10/22/2024   Nutrition   Diet: I don't know            10/22/2024   Exercise   Days per week of moderate/strenous exercise 4 days   Average minutes spent exercising at this level 30 min            10/22/2024   Social Factors   Frequency of gathering with friends or relatives Once a week   Worry food won't last until get money to buy more No   Food not last or not have enough money for food? No   Do you have housing? (Housing is defined as stable permanent housing and does not include staying ouside in a car, in a tent, in an abandoned building, in an overnight shelter, or couch-surfing.) Yes   Are you worried about losing your housing? No   Lack of transportation? No   Unable to get utilities (heat,electricity)? No            10/22/2024   Fall Risk   Fallen 2 or more times in the past year? No   Trouble with walking or balance? No             10/22/2024   Activities of Daily Living- Home Safety   Needs help with the following daily activites None of the above   Safety concerns in the home None of the above            10/22/2024   Dental   Dentist two times every year? Yes            10/22/2024   Hearing Screening   Hearing concerns? None of the above             10/22/2024   Driving Risk Screening   Patient/family members have concerns about driving No            10/22/2024   General Alertness/Fatigue Screening   Have you been more tired than usual lately? No            10/22/2024   Urinary Incontinence Screening   Bothered by leaking urine in past 6 months No            10/22/2024   TB Screening   Were you born outside of the [place]? No            Today's PHQ-2 Score:       10/22/2024     7:45 AM   PHQ-2 ( 1999 Pfizer)   Q1: Little interest or pleasure in doing things 0   Q2: Feeling down, depressed or hopeless 0   PHQ-2 Score 0           10/22/2024   Substance Use   Alcohol more than 3/day or more than 7/wk No   Do you have a current opioid prescription? No   How severe/bad is pain from 1 to 10? 2/10   Do you use any other substances recreationally? No        Social History     Tobacco Use    Smoking status: Never    Smokeless tobacco: Never   Substance Use Topics    Alcohol use: Yes     Alcohol/week: 0.8 standard drinks of alcohol     Types: 1 Standard drinks or equivalent per week     Comment: occasionally    Drug use: No           10/22/2024   AAA Screening   Family history of Abdominal Aortic Aneurysm (AAA)? No      ASCVD Risk   The 10-year ASCVD risk score (Mrita MCGOWAN, et al., 2019) is: 23.6%    Values used to calculate the score:      Age: 74 years      Sex: Male      Is Non- : No      Diabetic: No      Tobacco smoker: No      Systolic Blood Pressure: 141 mmHg      Is BP treated: Yes      HDL Cholesterol: 50 mg/dL      Total Cholesterol: 135 mg/dL            Reviewed and updated as needed this visit by Provider                    Lab work is in process  Current providers sharing in care for this patient include:  Patient Care Team:  Willie Nam MD as PCP - General (Family Practice)  Braulio Blanca MD as MD (Cardiology)  Willie Nam MD as Assigned PCP  Braulio Blanca MD as Assigned  "Heart and Vascular Provider    The following health maintenance items are reviewed in Epic and correct as of today:  Health Maintenance   Topic Date Due    DTAP/TDAP/TD IMMUNIZATION (2 - Td or Tdap) 10/24/2023    COVID-19 Vaccine (6 - 2024-25 season) 09/01/2024    MEDICARE ANNUAL WELLNESS VISIT  10/09/2024    ASTHMA CONTROL TEST  12/11/2024    LIPID  03/26/2025    BMP  06/11/2025    ASTHMA ACTION PLAN  10/22/2025    FALL RISK ASSESSMENT  10/22/2025    GLUCOSE  06/11/2027    ADVANCE CARE PLANNING  10/22/2029    COLORECTAL CANCER SCREENING  11/01/2031    HEPATITIS C SCREENING  Completed    PHQ-2 (once per calendar year)  Completed    INFLUENZA VACCINE  Completed    Pneumococcal Vaccine: 65+ Years  Completed    ZOSTER IMMUNIZATION  Completed    RSV VACCINE  Completed    HPV IMMUNIZATION  Aged Out    MENINGITIS IMMUNIZATION  Aged Out    RSV MONOCLONAL ANTIBODY  Aged Out         Review of Systems  Constitutional, HEENT, cardiovascular, pulmonary, GI, , musculoskeletal, neuro, skin, endocrine and psych systems are negative, except as otherwise noted.     Objective    Exam  BP (!) 141/65   Pulse 55   Wt 94.8 kg (209 lb)   SpO2 98%   BMI 27.39 kg/m     Estimated body mass index is 27.39 kg/m  as calculated from the following:    Height as of 6/11/24: 1.861 m (6' 1.25\").    Weight as of this encounter: 94.8 kg (209 lb).    Physical Exam  GENERAL: alert and no distress  EYES: Eyes grossly normal to inspection, PERRL and conjunctivae and sclerae normal  HENT: ear canals and TM's normal, nose and mouth without ulcers or lesions  NECK: no adenopathy, no asymmetry, masses, or scars  RESP: lungs clear to auscultation - no rales, rhonchi or wheezes  CV: regular rate and rhythm, normal S1 S2, no S3 or S4, no murmur, click or rub, no peripheral edema  ABDOMEN: soft, nontender, no hepatosplenomegaly, no masses and bowel sounds normal   (male): normal male genitalia without lesions or urethral discharge, no hernia  MS: no gross " musculoskeletal defects noted, no edema  SKIN: no suspicious lesions or rashes  NEURO: Normal strength and tone, mentation intact and speech normal  PSYCH: mentation appears normal, affect normal/bright        10/22/2024   Mini Cog   Clock Draw Score 2 Normal   3 Item Recall 3 objects recalled   Mini Cog Total Score 5               Signed Electronically by: Willie Nam MD

## 2024-12-16 DIAGNOSIS — I10 BENIGN ESSENTIAL HYPERTENSION: ICD-10-CM

## 2024-12-16 RX ORDER — LISINOPRIL 20 MG/1
20 TABLET ORAL EVERY MORNING
Qty: 90 TABLET | Refills: 0 | Status: SHIPPED | OUTPATIENT
Start: 2024-12-16 | End: 2024-12-17

## 2024-12-17 ENCOUNTER — OFFICE VISIT (OUTPATIENT)
Dept: CARDIOLOGY | Facility: CLINIC | Age: 74
End: 2024-12-17
Payer: COMMERCIAL

## 2024-12-17 VITALS
HEIGHT: 73 IN | SYSTOLIC BLOOD PRESSURE: 136 MMHG | WEIGHT: 209.1 LBS | OXYGEN SATURATION: 98 % | DIASTOLIC BLOOD PRESSURE: 60 MMHG | BODY MASS INDEX: 27.71 KG/M2 | HEART RATE: 60 BPM

## 2024-12-17 DIAGNOSIS — I25.10 CORONARY ARTERY DISEASE INVOLVING NATIVE CORONARY ARTERY OF NATIVE HEART WITHOUT ANGINA PECTORIS: Primary | ICD-10-CM

## 2024-12-17 DIAGNOSIS — I10 ESSENTIAL HYPERTENSION: ICD-10-CM

## 2024-12-17 DIAGNOSIS — E78.00 HYPERCHOLESTEREMIA: ICD-10-CM

## 2024-12-17 DIAGNOSIS — I10 BENIGN ESSENTIAL HYPERTENSION: ICD-10-CM

## 2024-12-17 DIAGNOSIS — E78.5 HYPERLIPIDEMIA WITH TARGET LDL LESS THAN 70: ICD-10-CM

## 2024-12-17 RX ORDER — LISINOPRIL 20 MG/1
20 TABLET ORAL EVERY MORNING
Qty: 90 TABLET | Refills: 4 | Status: SHIPPED | OUTPATIENT
Start: 2024-12-17

## 2024-12-17 RX ORDER — HYDROCHLOROTHIAZIDE 25 MG/1
25 TABLET ORAL DAILY
Qty: 90 TABLET | Refills: 4 | Status: SHIPPED | OUTPATIENT
Start: 2024-12-17

## 2024-12-17 NOTE — PROGRESS NOTES
HPI and Plan:     It is my pleasure to see your patient Peter Hernandez who is a very pleasant 74-year-old patient with a known history of essential hypertension and coronary artery disease with stenting of his right coronary artery in the past.    Since I last saw him he has done well with no chest pains no chest pressure no unusual shortness of breath.  He has lost a significant amount of weight since this time last year when he weighed 233 pounds.  He now weighs 209 pounds.  This is reflected in an improved blood pressure at 136/60.  He is lipid profile is excellent with an LDL of 66 HDL of 62 and triglycerides of 97 with a total cholesterol of 147.  He has basic metabolic profile is normal with a sodium 145 potassium of 4.3 BUN of 15.5 and a creatinine of 0.98 with a GFR of 82.  He has no evidence of hepatic toxicity from his statin therapy with an ALT of 25.    Impression:  1.  Coronary artery disease and status post prior stenting of the right coronary artery.  The patient is asymptomatic with respect to coronary artery disease with no symptoms suggestive angina pectoris.  2.  Essential hypertension.  His blood pressure is well-controlled.  3.  Excellent lipid profile well within secondary prevention guidelines.    Plan:  1.  We will continue the patient on his present medications and I will see him back again in 1 years time I will repeat his lipid profile and basic metabolic profile.  2.  As always the patient is been told to contact me if is any questions or any concerns.    The longitudinal plan of care for the diagnosis(es)/condition(s) as documented were addressed during this visit. Due to the added complexity in care, I will continue to support Reuben in the subsequent management and with ongoing continuity of care.    Braulio Blanca MD, FACC, FRCPI  Orders Placed This Encounter   Procedures    Basic metabolic panel    Lipid Profile    ALT    Follow-Up with Cardiology       Orders Placed This Encounter    Medications    hydrochlorothiazide (HYDRODIURIL) 25 MG tablet     Sig: Take 1 tablet (25 mg) by mouth daily.     Dispense:  90 tablet     Refill:  4    lisinopril (ZESTRIL) 20 MG tablet     Sig: Take 1 tablet (20 mg) by mouth every morning.     Dispense:  90 tablet     Refill:  4       Medications Discontinued During This Encounter   Medication Reason    amLODIPine (NORVASC) 5 MG tablet Discontinued by another Health Care Provider (No AVS)    hydrochlorothiazide (HYDRODIURIL) 25 MG tablet Reorder (No AVS)    lisinopril (ZESTRIL) 20 MG tablet Reorder (No AVS)         Encounter Diagnoses   Name Primary?    Coronary artery disease involving native coronary artery of native heart without angina pectoris Yes    Hyperlipidemia with target LDL less than 70     Hypercholesteremia     Benign essential hypertension     Essential hypertension        CURRENT MEDICATIONS:  Current Outpatient Medications   Medication Sig Dispense Refill    albuterol (VENTOLIN HFA) 108 (90 Base) MCG/ACT inhaler Inhale 2 puffs into the lungs every 6 hours as needed for shortness of breath or wheezing. 18 g 3    amLODIPine (NORVASC) 10 MG tablet Take 1 tablet (10 mg) by mouth daily. 90 tablet 3    amoxicillin (AMOXIL) 500 MG capsule TAKE 4 CAPSULES BY MOUTH 1 HOUR PRIOR TO DENTAL APPOINTMENT 4 capsule 4    Ascorbic Acid (VITAMIN C) 500 MG CAPS Take 500 mg by mouth 2 times daily (with meals).      aspirin (ASA) 81 MG EC tablet Take 81 mg by mouth daily.      budesonide-formoterol (SYMBICORT) 80-4.5 MCG/ACT Inhaler Inhale 2 puffs into the lungs two times daily. 30.6 g 1    hydrochlorothiazide (HYDRODIURIL) 25 MG tablet Take 1 tablet (25 mg) by mouth daily. 90 tablet 4    ketoconazole (NIZORAL) 2 % external cream APPLY TO AFFECTED AREA TOPICALLY EVERY DAY 60 g 3    lisinopril (ZESTRIL) 20 MG tablet Take 1 tablet (20 mg) by mouth every morning. 90 tablet 4    Multiple Vitamin (DAILY MULTIVITAMIN PO) Take 2 capsules by mouth every morning       Naphazoline-Pheniramine (OPCON-A OP) Place 1 drop into both eyes daily as needed (cat allergies).      naproxen (NAPROSYN) 500 MG tablet TAKE 1 TABLET BY MOUTH TWICE A DAY WITH FOOD 180 tablet 3    nitroGLYcerin (NITROSTAT) 0.4 MG sublingual tablet For chest pain place 1 tablet under the tongue every 5 minutes for 3 doses. If symptoms persist 5 minutes after 1st dose call 911. 25 tablet 3    Probiotic Product (PROBIOTIC-10 PO) Take 1 capsule by mouth every morning       Psyllium (METAMUCIL FIBER PO) Take 1-2 teaspoonful by mouth daily as needed. (mix with a glass of water and drink)      rosuvastatin (CRESTOR) 20 MG tablet TAKE 1 TABLET BY MOUTH EVERY DAY 90 tablet 3    senna-docusate (SENOKOT-S/PERICOLACE) 8.6-50 MG tablet Take 1-2 tablets by mouth 2 times daily Take while on oral narcotics to prevent or treat constipation. 30 tablet 0    Vitamin D, Cholecalciferol, 1000 units CAPS Take 3,000 Units by mouth every morning       zinc gluconate 50 MG tablet Take 50 mg by mouth every morning          ALLERGIES     Allergies   Allergen Reactions    Cats     Simvastatin Muscle Pain (Myalgia)     achey       PAST MEDICAL HISTORY:  Past Medical History:   Diagnosis Date    ACP (advance care planning)     will bring copy in     Arthritis     Bruit     CAD (coronary artery disease) 2002    a stent    CKD (chronic kidney disease)     Esophageal reflux 5/17/2013    Family history of ischemic heart disease     Hyperlipidaemia     Hypertension        PAST SURGICAL HISTORY:  Past Surgical History:   Procedure Laterality Date    ARTHROPLASTY HIP Right 2/23/2021    Procedure: RIGHT TOTAL HIP ARTHROPLASTY;  Surgeon: Gera Davison MD;  Location: [facility] OR    CYSTECTOMY PILONIDAL      HEART CATH, ANGIOPLASTY  10/4/02    3.0 X 8 mm Velocity stent    ROTATOR CUFF REPAIR RT/LT  2009    Dr. Butler       FAMILY HISTORY:  Family History   Problem Relation Age of Onset    Cerebrovascular Disease Mother     C.A.D. Father     Cancer Father          bone    Coronary Artery Disease Brother     Heart Disease Brother     No Known Problems Brother     No Known Problems Sister     No Known Problems Sister        SOCIAL HISTORY:  Social History     Socioeconomic History    Marital status:      Spouse name: None    Number of children: None    Years of education: None    Highest education level: None   Occupational History    Occupation: Finance   Tobacco Use    Smoking status: Never    Smokeless tobacco: Never   Substance and Sexual Activity    Alcohol use: Yes     Alcohol/week: 0.8 standard drinks of alcohol     Types: 1 Standard drinks or equivalent per week     Comment: occasionally    Drug use: No    Sexual activity: Yes     Partners: Female   Other Topics Concern    Parent/sibling w/ CABG, MI or angioplasty before 65F 55M? No    Sleep Concern Yes     Comment: occ    Stress Concern No    Exercise Yes     Comment: walks daily     Social Drivers of Health     Financial Resource Strain: Low Risk  (10/22/2024)    Financial Resource Strain     Within the past 12 months, have you or your family members you live with been unable to get utilities (heat, electricity) when it was really needed?: No   Food Insecurity: Low Risk  (10/22/2024)    Food Insecurity     Within the past 12 months, did you worry that your food would run out before you got money to buy more?: No     Within the past 12 months, did the food you bought just not last and you didn t have money to get more?: No   Transportation Needs: Low Risk  (10/22/2024)    Transportation Needs     Within the past 12 months, has lack of transportation kept you from medical appointments, getting your medicines, non-medical meetings or appointments, work, or from getting things that you need?: No   Physical Activity: Insufficiently Active (10/22/2024)    Exercise Vital Sign     Days of Exercise per Week: 4 days     Minutes of Exercise per Session: 30 min   Stress: No Stress Concern Present (10/22/2024)     "[employer] [place] [employer] - Occupational Stress Questionnaire     Feeling of Stress : Only a little   Social Connections: Unknown (10/22/2024)    Social Connection and Isolation Panel [NHANES]     Frequency of Social Gatherings with Friends and Family: Once a week   Housing Stability: Low Risk  (10/22/2024)    Housing Stability     Do you have housing? : Yes     Are you worried about losing your housing?: No       Review of Systems:  Skin:          Eyes:         ENT:         Respiratory:  Negative       Cardiovascular:  Negative      Gastroenterology:        Genitourinary:         Musculoskeletal:         Neurologic:         Psychiatric:         Heme/Lymph/Imm:         Endocrine:           Physical Exam:  Vitals: /60   Pulse 60   Ht 1.861 m (6' 1.25\")   Wt 94.8 kg (209 lb 1.6 oz)   SpO2 98%   BMI 27.40 kg/m      Constitutional:  cooperative, alert and oriented, well developed, well nourished, in no acute distress        Skin:  warm and dry to the touch, no apparent skin lesions or masses noted          Head:  normocephalic, no masses or lesions        Eyes:  pupils equal and round, conjunctivae and lids unremarkable, sclera white, no xanthalasma, EOMS intact, no nystagmus        Lymph:No Cervical lymphadenopathy present     ENT:  no pallor or cyanosis, dentition good        Neck:  carotid pulses are full and equal bilaterally, JVP normal, no carotid bruit        Respiratory:  normal breath sounds, clear to auscultation, normal A-P diameter, normal symmetry, normal respiratory excursion, no use of accessory muscles         Cardiac: regular rhythm, normal S1 and S2, apical impulse not displaced, no murmurs, gallops or rubs detected   S4            pulses full and equal, no bruits auscultated                                        GI:  not assessed this visit        Extremities and Muscular Skeletal:  no deformities, clubbing, cyanosis, erythema observed, no edema              Neurological:  " no gross motor deficits, affect appropriate        Psych:  Alert and Oriented x 3        CC  Willie Nam MD  [address] [place] [address]

## 2024-12-17 NOTE — LETTER
12/17/2024    Willie Nam MD  [address]  [facility]– [place]note 2[place] [id number]    RE: Peter Hernandez II       Dear Colleague,     I had the pleasure of seeing Peter Hernandez II in the [facility].  HPI and Plan:     It is my pleasure to see your patient Peter Hernandez who is a very pleasant 74-year-old patient with a known history of essential hypertension and coronary artery disease with stenting of his right coronary artery in the past.    Since I last saw him he has done well with no chest pains no chest pressure no unusual shortness of breath.  He has lost a significant amount of weight since this time last year when he weighed 233 pounds.  He now weighs 209 pounds.  This is reflected in an improved blood pressure at 136/60.  He is lipid profile is excellent with an LDL of 66 HDL of 62 and triglycerides of 97 with a total cholesterol of 147.  He has basic metabolic profile is normal with a sodium 145 potassium of 4.3 BUN of 15.5 and a creatinine of 0.98 with a GFR of 82.  He has no evidence of hepatic toxicity from his statin therapy with an ALT of 25.    Impression:  1.  Coronary artery disease and status post prior stenting of the right coronary artery.  The patient is asymptomatic with respect to coronary artery disease with no symptoms suggestive angina pectoris.  2.  Essential hypertension.  His blood pressure is well-controlled.  3.  Excellent lipid profile well within secondary prevention guidelines.    Plan:  1.  We will continue the patient on his present medications and I will see him back again in 1 years time I will repeat his lipid profile and basic metabolic profile.  2.  As always the patient is been told to contact me if is any questions or any concerns.    The longitudinal plan of care for the diagnosis(es)/condition(s) as documented were addressed during this visit. Due to the added complexity in care, I will continue to support Reuben in the subsequent management and  with ongoing continuity of care.    Braulio Blanca MD, FACC, FRCPI  Orders Placed This Encounter   Procedures     Basic metabolic panel     Lipid Profile     ALT     Follow-Up with Cardiology       Orders Placed This Encounter   Medications     hydrochlorothiazide (HYDRODIURIL) 25 MG tablet     Sig: Take 1 tablet (25 mg) by mouth daily.     Dispense:  90 tablet     Refill:  4     lisinopril (ZESTRIL) 20 MG tablet     Sig: Take 1 tablet (20 mg) by mouth every morning.     Dispense:  90 tablet     Refill:  4       Medications Discontinued During This Encounter   Medication Reason     amLODIPine (NORVASC) 5 MG tablet Discontinued by another Health Care Provider (No AVS)     hydrochlorothiazide (HYDRODIURIL) 25 MG tablet Reorder (No AVS)     lisinopril (ZESTRIL) 20 MG tablet Reorder (No AVS)         Encounter Diagnoses   Name Primary?     Coronary artery disease involving native coronary artery of native heart without angina pectoris Yes     Hyperlipidemia with target LDL less than 70      Hypercholesteremia      Benign essential hypertension      Essential hypertension        CURRENT MEDICATIONS:  Current Outpatient Medications   Medication Sig Dispense Refill     albuterol (VENTOLIN HFA) 108 (90 Base) MCG/ACT inhaler Inhale 2 puffs into the lungs every 6 hours as needed for shortness of breath or wheezing. 18 g 3     amLODIPine (NORVASC) 10 MG tablet Take 1 tablet (10 mg) by mouth daily. 90 tablet 3     amoxicillin (AMOXIL) 500 MG capsule TAKE 4 CAPSULES BY MOUTH 1 HOUR PRIOR TO DENTAL APPOINTMENT 4 capsule 4     Ascorbic Acid (VITAMIN C) 500 MG CAPS Take 500 mg by mouth 2 times daily (with meals).       aspirin (ASA) 81 MG EC tablet Take 81 mg by mouth daily.       budesonide-formoterol (SYMBICORT) 80-4.5 MCG/ACT Inhaler Inhale 2 puffs into the lungs two times daily. 30.6 g 1     hydrochlorothiazide (HYDRODIURIL) 25 MG tablet Take 1 tablet (25 mg) by mouth daily. 90 tablet 4     ketoconazole (NIZORAL) 2 %  external cream APPLY TO AFFECTED AREA TOPICALLY EVERY DAY 60 g 3     lisinopril (ZESTRIL) 20 MG tablet Take 1 tablet (20 mg) by mouth every morning. 90 tablet 4     Multiple Vitamin (DAILY MULTIVITAMIN PO) Take 2 capsules by mouth every morning       Naphazoline-Pheniramine (OPCON-A OP) Place 1 drop into both eyes daily as needed (cat allergies).       naproxen (NAPROSYN) 500 MG tablet TAKE 1 TABLET BY MOUTH TWICE A DAY WITH FOOD 180 tablet 3     nitroGLYcerin (NITROSTAT) 0.4 MG sublingual tablet For chest pain place 1 tablet under the tongue every 5 minutes for 3 doses. If symptoms persist 5 minutes after 1st dose call 911. 25 tablet 3     Probiotic Product (PROBIOTIC-10 PO) Take 1 capsule by mouth every morning        Psyllium (METAMUCIL FIBER PO) Take 1-2 teaspoonful by mouth daily as needed. (mix with a glass of water and drink)       rosuvastatin (CRESTOR) 20 MG tablet TAKE 1 TABLET BY MOUTH EVERY DAY 90 tablet 3     senna-docusate (SENOKOT-S/PERICOLACE) 8.6-50 MG tablet Take 1-2 tablets by mouth 2 times daily Take while on oral narcotics to prevent or treat constipation. 30 tablet 0     Vitamin D, Cholecalciferol, 1000 units CAPS Take 3,000 Units by mouth every morning        zinc gluconate 50 MG tablet Take 50 mg by mouth every morning          ALLERGIES     Allergies   Allergen Reactions     Cats      Simvastatin Muscle Pain (Myalgia)     achey       PAST MEDICAL HISTORY:  Past Medical History:   Diagnosis Date     ACP (advance care planning)     will bring copy in      Arthritis      Bruit      CAD (coronary artery disease) 2002    a stent     CKD (chronic kidney disease)      Esophageal reflux 5/17/2013     Family history of ischemic heart disease      Hyperlipidaemia      Hypertension        PAST SURGICAL HISTORY:  Past Surgical History:   Procedure Laterality Date     ARTHROPLASTY HIP Right 2/23/2021    Procedure: RIGHT TOTAL HIP ARTHROPLASTY;  Surgeon: Gera Davison MD;  Location:  OR      CYSTECTOMY PILONIDAL       HEART CATH, ANGIOPLASTY  10/4/02    3.0 X 8 mm Velocity stent     ROTATOR CUFF REPAIR RT/LT  2009    Dr. Butler       FAMILY HISTORY:  Family History   Problem Relation Age of Onset     Cerebrovascular Disease Mother      C.A.D. Father      Cancer Father         bone     Coronary Artery Disease Brother      Heart Disease Brother      No Known Problems Brother      No Known Problems Sister      No Known Problems Sister        SOCIAL HISTORY:  Social History     Socioeconomic History     Marital status:      Spouse name: None     Number of children: None     Years of education: None     Highest education level: None   Occupational History     Occupation: Finance   Tobacco Use     Smoking status: Never     Smokeless tobacco: Never   Substance and Sexual Activity     Alcohol use: Yes     Alcohol/week: 0.8 standard drinks of alcohol     Types: 1 Standard drinks or equivalent per week     Comment: occasionally     Drug use: No     Sexual activity: Yes     Partners: Female   Other Topics Concern     Parent/sibling w/ CABG, MI or angioplasty before 65F 55M? No     Sleep Concern Yes     Comment: occ     Stress Concern No     Exercise Yes     Comment: walks daily     Social Drivers of Health     Financial Resource Strain: Low Risk  (10/22/2024)    Financial Resource Strain      Within the past 12 months, have you or your family members you live with been unable to get utilities (heat, electricity) when it was really needed?: No   Food Insecurity: Low Risk  (10/22/2024)    Food Insecurity      Within the past 12 months, did you worry that your food would run out before you got money to buy more?: No      Within the past 12 months, did the food you bought just not last and you didn t have money to get more?: No   Transportation Needs: Low Risk  (10/22/2024)    Transportation Needs      Within the past 12 months, has lack of transportation kept you from medical appointments, getting your  "medicines, non-medical meetings or appointments, work, or from getting things that you need?: No   Physical Activity: Insufficiently Active (10/22/2024)    Exercise Vital Sign      Days of Exercise per Week: 4 days      Minutes of Exercise per Session: 30 min   Stress: No Stress Concern Present (10/22/2024)    [employer] [place] [employer] - Occupational Stress Questionnaire      Feeling of Stress : Only a little   Social Connections: Unknown (10/22/2024)    Social Connection and Isolation Panel [NHANES]      Frequency of Social Gatherings with Friends and Family: Once a week   Housing Stability: Low Risk  (10/22/2024)    Housing Stability      Do you have housing? : Yes      Are you worried about losing your housing?: No       Review of Systems:  Skin:          Eyes:         ENT:         Respiratory:  Negative       Cardiovascular:  Negative      Gastroenterology:        Genitourinary:         Musculoskeletal:         Neurologic:         Psychiatric:         Heme/Lymph/Imm:         Endocrine:           Physical Exam:  Vitals: /60   Pulse 60   Ht 1.861 m (6' 1.25\")   Wt 94.8 kg (209 lb 1.6 oz)   SpO2 98%   BMI 27.40 kg/m      Constitutional:  cooperative, alert and oriented, well developed, well nourished, in no acute distress        Skin:  warm and dry to the touch, no apparent skin lesions or masses noted          Head:  normocephalic, no masses or lesions        Eyes:  pupils equal and round, conjunctivae and lids unremarkable, sclera white, no xanthalasma, EOMS intact, no nystagmus        Lymph:No Cervical lymphadenopathy present     ENT:  no pallor or cyanosis, dentition good        Neck:  carotid pulses are full and equal bilaterally, JVP normal, no carotid bruit        Respiratory:  normal breath sounds, clear to auscultation, normal A-P diameter, normal symmetry, normal respiratory excursion, no use of accessory muscles         Cardiac: regular rhythm, normal S1 and S2, apical impulse " not displaced, no murmurs, gallops or rubs detected   S4            pulses full and equal, no bruits auscultated                                        GI:  not assessed this visit        Extremities and Muscular Skeletal:  no deformities, clubbing, cyanosis, erythema observed, no edema              Neurological:  no gross motor deficits, affect appropriate        Psych:  Alert and Oriented x 3        CC  Willie Nam MD  [address] [place] [address]                  Thank you for allowing me to participate in the care of your patient.      Sincerely,     Braulio Blanca MD, MD     [facility]  cc:   Willie Nam MD  [address] [place] [address]

## 2025-01-22 NOTE — ADDENDUM NOTE
Addended by: FORTUNATO OTOOLE on: 10/24/2022 10:18 AM     Modules accepted: Orders    
ROM  Respiratory: Lungs clear to auscultation bilaterally, no wheezes, rales, or rhonchi. Not in respiratory distress  Cardiovascular:  Regular rate. Regular rhythm. No murmurs, no gallops, no rubs. 2+ distal pulses. Equal extremity pulses.   Chest: No chest wall tenderness  GI:  Abdomen Soft, Non tender, Non distended.  No rebound, guarding, or rigidity. No pulsatile masses.  Musculoskeletal: Moves all extremities x 4. Capillary refill <3 seconds  Integument: skin warm and dry.    Neurologic: GCS 15, no focal deficits, symmetric strength 5/5 in the upper and lower extremities bilaterally    DIAGNOSTIC RESULTS   LABS:    Labs Reviewed   MAGNESIUM - Abnormal; Notable for the following components:       Result Value    Magnesium 1.4 (*)     All other components within normal limits   CBC - Abnormal; Notable for the following components:    .8 (*)     MCH 36.3 (*)     MCHC 35.0 (*)     All other components within normal limits   COMPREHENSIVE METABOLIC PANEL - Abnormal; Notable for the following components:    Chloride 97 (*)     Anion Gap 18 (*)     Glucose 156 (*)     ALT 72 (*)     AST 95 (*)     All other components within normal limits   SERUM DRUG SCREEN - Abnormal; Notable for the following components:    Acetaminophen Level <5 (*)     All other components within normal limits   URINE DRUG SCREEN       As interpreted by me, the above displayed labs are abnormal. All other labs obtained during this visit were within normal range or not returned as of this dictation.    RADIOLOGY:   Non-plain film images such as CT, Ultrasound and MRI are read by the radiologist. Plain radiographic images are visualized and preliminarily interpreted by the ED Provider with the below findings:    Interpretation per the Radiologist below, if available at the time of this note:    CT HEAD WO CONTRAST   Final Result   No acute intracranial abnormality.           CT HEAD WO CONTRAST    Result Date: 1/21/2025  EXAMINATION: CT OF

## 2025-01-28 ENCOUNTER — OFFICE VISIT (OUTPATIENT)
Dept: FAMILY MEDICINE | Facility: CLINIC | Age: 75
End: 2025-01-28

## 2025-01-28 VITALS
BODY MASS INDEX: 27.54 KG/M2 | HEART RATE: 58 BPM | OXYGEN SATURATION: 97 % | SYSTOLIC BLOOD PRESSURE: 137 MMHG | HEIGHT: 74 IN | DIASTOLIC BLOOD PRESSURE: 83 MMHG | WEIGHT: 214.6 LBS

## 2025-01-28 DIAGNOSIS — L98.9 SKIN LESION: Primary | ICD-10-CM

## 2025-01-28 PROCEDURE — 99213 OFFICE O/P EST LOW 20 MIN: CPT | Mod: 25

## 2025-01-28 PROCEDURE — 17000 DESTRUCT PREMALG LESION: CPT

## 2025-01-28 NOTE — PROGRESS NOTES
"  Assessment & Plan     Skin lesion  Patient presents for a skin lesion to left arm. Reviewed possible etiologies. Discussed various treatment methods, side effects and failure rates have been discussed.  A choice of liquid nitrogen was made, and the expected blistering or scabbing reaction explained.  Liquid nitrogen was applied to 1 skin lesion. Red flags that warrant emergent evaluation discussed. Follow up as needed for new or worsening symptoms or if symptoms fail to improve. Patient agreeable to plan. All questions answered.   - DESTRUCT BENIGN LESION, UP TO 14          Work on weight loss  Regular exercise  See Patient Instructions    Return if symptoms worsen or fail to improve, for Follow up.    Subjective   Reuben is a 74 year old, presenting for the following health issues:  Derm Problem    HPI       Skin Lesion  Onset/Duration: Been there forever 6 months maybe a year  Description  Location: left arm  Color: white  Border description: raised, circular, pink edges  Character: round  Itching: no  Bleeding:  No  Intensity:  none but maybe after bugging it  Progression of Symptoms:  same  Accompanying signs and symptoms:   Bleeding: No  Scaling: No  Excessive sun exposure/tanning: No  Sunscreen used: YES  History:           Any previous history of skin cancer: No. Follows with dermatology and removes non cancerous facial lesions   Any family history of melanoma: No  Previous episodes of similar lesion: No  Precipitating or alleviating factors: none  Therapies tried and outcome: none        Review of Systems  Constitutional, HEENT, cardiovascular, pulmonary, gi and gu systems are negative, except as otherwise noted.      Objective    /83   Pulse 58   Ht 1.873 m (6' 1.75\")   Wt 97.3 kg (214 lb 9.6 oz)   SpO2 97%   BMI 27.74 kg/m    Body mass index is 27.74 kg/m .  Physical Exam   GENERAL: alert and no distress  RESP: respirations even and unlabored  SKIN: small white growth to left forearm  PSYCH: " mentation appears normal, affect normal/bright    PROCEDURE:    After the potential risks were discussed, verbal consent was obtained.  Approximately 15 seconds of freeze time was applied to the lesion using open spray technique with a 1 mm margin using 3 freeze-thaw cycles.  There were no immediate complications.  The patient tolerated the procedure well.  Frequent use of vaseline was recommended to promote good healing.  Signs of infection or complications were discussed and understood.         Signed Electronically by: JEANNE Llanos CNP

## 2025-01-28 NOTE — PATIENT INSTRUCTIONS
Wound Care Instructions for Liquid Nitrogen Treatment     The treatment area will appear white at first. Over the next several hours a fluid-filled blister may form. The blister can be very dark. If blister appears, it will remain blistered for about a week. Then a scab will form over the area.   Leave the blistered area uncovered as long as it remains closed. If the area breaks open, you may cover it with a clean band aid. Do not pull off the skin covering the blister.   If the blistered area becomes uncomfortably filled with fluid, you may release some of the fluid by puncturing the blister with a needle that has been cleansed with alcohol.   If the skin covering the blister comes off, clean the area daily with soap and water. Apply a small amount of Vaseline and then cover with a clean band aid. Change the band aid twice daily until the skin is completely healed.     Call us if.....   1. You have signs of infection such as thick yellow or pus-like drainage from the wound site or a fever over 100 degrees Fahrenheit.   2. You have any questions or are not sure how to take care of the wound.

## 2025-06-23 ENCOUNTER — TRANSFERRED RECORDS (OUTPATIENT)
Dept: FAMILY MEDICINE | Facility: CLINIC | Age: 75
End: 2025-06-23

## 2025-07-28 ENCOUNTER — TRANSFERRED RECORDS (OUTPATIENT)
Dept: FAMILY MEDICINE | Facility: CLINIC | Age: 75
End: 2025-07-28

## 2025-08-05 ENCOUNTER — OFFICE VISIT (OUTPATIENT)
Dept: FAMILY MEDICINE | Facility: CLINIC | Age: 75
End: 2025-08-05

## 2025-08-05 VITALS
HEART RATE: 59 BPM | OXYGEN SATURATION: 97 % | BODY MASS INDEX: 28.1 KG/M2 | SYSTOLIC BLOOD PRESSURE: 124 MMHG | DIASTOLIC BLOOD PRESSURE: 88 MMHG | HEIGHT: 73 IN | WEIGHT: 212 LBS

## 2025-08-05 DIAGNOSIS — N18.31 STAGE 3A CHRONIC KIDNEY DISEASE (H): ICD-10-CM

## 2025-08-05 DIAGNOSIS — I10 BENIGN ESSENTIAL HYPERTENSION: ICD-10-CM

## 2025-08-05 DIAGNOSIS — I70.0 ATHEROSCLEROSIS OF AORTA: ICD-10-CM

## 2025-08-05 DIAGNOSIS — R10.32 ABDOMINAL PAIN, LEFT LOWER QUADRANT: Primary | ICD-10-CM

## 2025-08-05 DIAGNOSIS — I25.118 CORONARY ARTERY DISEASE OF NATIVE ARTERY OF NATIVE HEART WITH STABLE ANGINA PECTORIS: ICD-10-CM

## 2025-08-05 LAB
% GRANULOCYTES: 75.1 % (ref 42.2–75.2)
ALBUMIN SERPL BCG-MCNC: 4.1 G/DL (ref 3.5–5.2)
ALP SERPL-CCNC: 67 U/L (ref 40–150)
ALT SERPL W P-5'-P-CCNC: 20 U/L (ref 0–70)
ANION GAP SERPL CALCULATED.3IONS-SCNC: 11 MMOL/L (ref 7–15)
AST SERPL W P-5'-P-CCNC: 16 U/L (ref 0–45)
BILIRUB SERPL-MCNC: 0.3 MG/DL
BUN SERPL-MCNC: 22.3 MG/DL (ref 8–23)
CALCIUM SERPL-MCNC: 9.3 MG/DL (ref 8.8–10.4)
CHLORIDE SERPL-SCNC: 102 MMOL/L (ref 98–107)
CREAT SERPL-MCNC: 1.12 MG/DL (ref 0.67–1.17)
EGFRCR SERPLBLD CKD-EPI 2021: 69 ML/MIN/1.73M2
FASTING STATUS PATIENT QL REPORTED: NO
GLUCOSE SERPL-MCNC: 106 MG/DL (ref 70–99)
HCO3 SERPL-SCNC: 25 MMOL/L (ref 22–29)
HCT VFR BLD AUTO: 39.4 % (ref 39–51)
HEMOGLOBIN: 13.7 G/DL (ref 13.4–17.5)
LYMPHOCYTES NFR BLD AUTO: 18.6 % (ref 20.5–51.1)
MCH RBC QN AUTO: 30.9 PG (ref 27–31)
MCHC RBC AUTO-ENTMCNC: 34.8 G/DL (ref 33–37)
MCV RBC AUTO: 88.9 FL (ref 80–100)
MONOCYTES NFR BLD AUTO: 6.3 % (ref 1.7–9.3)
PLATELET # BLD AUTO: 242 K/UL (ref 140–450)
POTASSIUM SERPL-SCNC: 4.4 MMOL/L (ref 3.4–5.3)
PROT SERPL-MCNC: 6.6 G/DL (ref 6.4–8.3)
RBC # BLD AUTO: 4.42 X10/CMM (ref 4.2–5.9)
SODIUM SERPL-SCNC: 138 MMOL/L (ref 135–145)
WBC # BLD AUTO: 10.6 X10/CMM (ref 3.8–11)

## 2025-08-05 PROCEDURE — 3074F SYST BP LT 130 MM HG: CPT | Performed by: FAMILY MEDICINE

## 2025-08-05 PROCEDURE — 36415 COLL VENOUS BLD VENIPUNCTURE: CPT | Performed by: FAMILY MEDICINE

## 2025-08-05 PROCEDURE — G2211 COMPLEX E/M VISIT ADD ON: HCPCS | Performed by: FAMILY MEDICINE

## 2025-08-05 PROCEDURE — 99214 OFFICE O/P EST MOD 30 MIN: CPT | Performed by: FAMILY MEDICINE

## 2025-08-05 PROCEDURE — 85025 COMPLETE CBC W/AUTO DIFF WBC: CPT | Performed by: FAMILY MEDICINE

## 2025-08-05 PROCEDURE — 3079F DIAST BP 80-89 MM HG: CPT | Performed by: FAMILY MEDICINE

## 2025-08-05 PROCEDURE — 80053 COMPREHEN METABOLIC PANEL: CPT | Performed by: FAMILY MEDICINE

## 2025-08-05 RX ORDER — NITROGLYCERIN 0.4 MG/1
TABLET SUBLINGUAL
Qty: 25 TABLET | Refills: 3 | Status: SHIPPED | OUTPATIENT
Start: 2025-08-05

## 2025-08-05 RX ORDER — LISINOPRIL 20 MG/1
20 TABLET ORAL EVERY MORNING
Qty: 90 TABLET | Refills: 4 | Status: SHIPPED | OUTPATIENT
Start: 2025-08-05

## 2025-08-05 ASSESSMENT — ASTHMA QUESTIONNAIRES
QUESTION_1 LAST FOUR WEEKS HOW MUCH OF THE TIME DID YOUR ASTHMA KEEP YOU FROM GETTING AS MUCH DONE AT WORK, SCHOOL OR AT HOME: NONE OF THE TIME
QUESTION_2 LAST FOUR WEEKS HOW OFTEN HAVE YOU HAD SHORTNESS OF BREATH: NOT AT ALL
QUESTION_4 LAST FOUR WEEKS HOW OFTEN HAVE YOU USED YOUR RESCUE INHALER OR NEBULIZER MEDICATION (SUCH AS ALBUTEROL): NOT AT ALL
ACT_TOTALSCORE: 24
QUESTION_3 LAST FOUR WEEKS HOW OFTEN DID YOUR ASTHMA SYMPTOMS (WHEEZING, COUGHING, SHORTNESS OF BREATH, CHEST TIGHTNESS OR PAIN) WAKE YOU UP AT NIGHT OR EARLIER THAN USUAL IN THE MORNING: NOT AT ALL
QUESTION_5 LAST FOUR WEEKS HOW WOULD YOU RATE YOUR ASTHMA CONTROL: WELL CONTROLLED

## 2025-08-08 ENCOUNTER — ANCILLARY PROCEDURE (OUTPATIENT)
Dept: CT IMAGING | Facility: CLINIC | Age: 75
End: 2025-08-08
Attending: FAMILY MEDICINE
Payer: COMMERCIAL

## 2025-08-08 DIAGNOSIS — R10.32 ABDOMINAL PAIN, LEFT LOWER QUADRANT: ICD-10-CM

## 2025-08-08 PROCEDURE — 250N000009 HC RX 250: Performed by: FAMILY MEDICINE

## 2025-08-08 PROCEDURE — 250N000011 HC RX IP 250 OP 636: Performed by: FAMILY MEDICINE

## 2025-08-08 PROCEDURE — 74177 CT ABD & PELVIS W/CONTRAST: CPT

## 2025-08-08 RX ORDER — IOPAMIDOL 755 MG/ML
104 INJECTION, SOLUTION INTRAVASCULAR ONCE
Status: COMPLETED | OUTPATIENT
Start: 2025-08-08 | End: 2025-08-08

## 2025-08-08 RX ADMIN — SODIUM CHLORIDE 50 ML: 9 INJECTION, SOLUTION INTRAVENOUS at 08:37

## 2025-08-08 RX ADMIN — IOPAMIDOL 104 ML: 755 INJECTION, SOLUTION INTRAVENOUS at 08:37

## 2025-08-23 DIAGNOSIS — E78.00 HYPERCHOLESTEREMIA: ICD-10-CM

## 2025-08-25 RX ORDER — ROSUVASTATIN CALCIUM 20 MG/1
20 TABLET, COATED ORAL DAILY
Qty: 90 TABLET | Refills: 3 | Status: SHIPPED | OUTPATIENT
Start: 2025-08-25

## (undated) DEVICE — GLOVE PROTEXIS POWDER FREE 7.0 ORTHOPEDIC 2D73ET70

## (undated) DEVICE — DRSG XEROFORM 5X9" 8884431605

## (undated) DEVICE — MANIFOLD NEPTUNE 4 PORT 700-20

## (undated) DEVICE — PACK TOTAL HIP W/U DRAPE SOP15HUFSC

## (undated) DEVICE — SYR 50ML LL W/O NDL 309653

## (undated) DEVICE — SOLUTION WOUND CLEANSING 3/4OZ 10% PVP EA-L3011FB-50

## (undated) DEVICE — PREP CHLORAPREP 26ML TINTED ORANGE  260815

## (undated) DEVICE — BONE CLEANING TIP INTERPULSE  0210-010-000

## (undated) DEVICE — GLOVE PROTEXIS BLUE W/NEU-THERA 7.0  2D73EB70

## (undated) DEVICE — PREP SKIN SCRUB TRAY 4461A

## (undated) DEVICE — ESU ELEC BLADE 6" COATED E1450-6

## (undated) DEVICE — IMM PILLOW ABDUCT HIP MED 31143061

## (undated) DEVICE — DRAIN ROUND W/RESERV KIT JACKSON PRATT 10FR 400ML SU130-402D

## (undated) DEVICE — SUCTION IRR SYSTEM W/O TIP INTERPULSE HANDPIECE 0210-100-000

## (undated) DEVICE — GLOVE PROTEXIS BLUE W/NEU-THERA 8.0  2D73EB80

## (undated) DEVICE — SU VICRYL 2-0 CP-1 27" UND J266H

## (undated) DEVICE — NDL SPINAL 18GA 3.5" 405184

## (undated) DEVICE — DRAPE IOBAN INCISE 23X17" 6650EZ

## (undated) DEVICE — BLADE SAW SAGITTAL STRK 25X79.5X1.24MM 4/2000 2108-318-000

## (undated) DEVICE — HOOD FLYTE W/PEELAWAY 408-800-100

## (undated) DEVICE — SU VICRYL 0 CP-1 27" J467H

## (undated) DEVICE — SOL WATER IRRIG 1000ML BOTTLE 2F7114

## (undated) DEVICE — ESU GROUND PAD UNIVERSAL W/O CORD

## (undated) DEVICE — LINEN TOWEL PACK X5 5464

## (undated) DEVICE — DRSG KERLIX 4 1/2"X4YDS ROLL 6715

## (undated) DEVICE — GLOVE PROTEXIS POWDER FREE 8.0 ORTHOPEDIC 2D73ET80

## (undated) DEVICE — GLOVE PROTEXIS POWDER FREE 7.5 ORTHOPEDIC 2D73ET75

## (undated) DEVICE — SU ETHIBOND 0 CTX CR  8X18" CX31D

## (undated) RX ORDER — REGADENOSON 0.08 MG/ML
INJECTION, SOLUTION INTRAVENOUS
Status: DISPENSED
Start: 2021-02-15

## (undated) RX ORDER — DEXAMETHASONE SODIUM PHOSPHATE 4 MG/ML
INJECTION, SOLUTION INTRA-ARTICULAR; INTRALESIONAL; INTRAMUSCULAR; INTRAVENOUS; SOFT TISSUE
Status: DISPENSED
Start: 2021-02-23

## (undated) RX ORDER — CEFAZOLIN SODIUM 2 G/100ML
INJECTION, SOLUTION INTRAVENOUS
Status: DISPENSED
Start: 2021-02-23

## (undated) RX ORDER — TRANEXAMIC ACID 650 MG/1
TABLET ORAL
Status: DISPENSED
Start: 2021-02-23

## (undated) RX ORDER — FENTANYL CITRATE 50 UG/ML
INJECTION, SOLUTION INTRAMUSCULAR; INTRAVENOUS
Status: DISPENSED
Start: 2021-02-23

## (undated) RX ORDER — HYDROMORPHONE HYDROCHLORIDE 1 MG/ML
INJECTION, SOLUTION INTRAMUSCULAR; INTRAVENOUS; SUBCUTANEOUS
Status: DISPENSED
Start: 2021-02-23

## (undated) RX ORDER — NEOSTIGMINE METHYLSULFATE 1 MG/ML
VIAL (ML) INJECTION
Status: DISPENSED
Start: 2021-02-23

## (undated) RX ORDER — GLYCOPYRROLATE 0.2 MG/ML
INJECTION, SOLUTION INTRAMUSCULAR; INTRAVENOUS
Status: DISPENSED
Start: 2021-02-23

## (undated) RX ORDER — ONDANSETRON 2 MG/ML
INJECTION INTRAMUSCULAR; INTRAVENOUS
Status: DISPENSED
Start: 2021-02-23

## (undated) RX ORDER — PROPOFOL 10 MG/ML
INJECTION, EMULSION INTRAVENOUS
Status: DISPENSED
Start: 2021-02-23

## (undated) RX ORDER — LIDOCAINE HYDROCHLORIDE 20 MG/ML
INJECTION, SOLUTION EPIDURAL; INFILTRATION; INTRACAUDAL; PERINEURAL
Status: DISPENSED
Start: 2021-02-23